# Patient Record
Sex: FEMALE | Race: WHITE | NOT HISPANIC OR LATINO | Employment: OTHER | URBAN - METROPOLITAN AREA
[De-identification: names, ages, dates, MRNs, and addresses within clinical notes are randomized per-mention and may not be internally consistent; named-entity substitution may affect disease eponyms.]

---

## 2023-03-11 PROBLEM — Z85.3 HISTORY OF BREAST CANCER: Status: ACTIVE | Noted: 2023-03-11

## 2023-03-11 PROBLEM — F03.90 DEMENTIA WITHOUT BEHAVIORAL DISTURBANCE (MULTI): Status: ACTIVE | Noted: 2023-03-11

## 2023-04-14 ENCOUNTER — NURSING HOME VISIT (OUTPATIENT)
Dept: POST ACUTE CARE | Facility: EXTERNAL LOCATION | Age: 73
End: 2023-04-14
Payer: MEDICARE

## 2023-04-14 DIAGNOSIS — F03.90 DEMENTIA WITHOUT BEHAVIORAL DISTURBANCE (MULTI): Primary | ICD-10-CM

## 2023-04-14 DIAGNOSIS — R64 CACHEXIA (MULTI): ICD-10-CM

## 2023-04-14 PROCEDURE — 99308 SBSQ NF CARE LOW MDM 20: CPT | Performed by: INTERNAL MEDICINE

## 2023-04-14 NOTE — LETTER
Patient: Snehal Souza  : 1950    Encounter Date: 2023    Subjective  Patient ID: Snehal Souza is a 72 y.o. female who is long term resident being seen and evaluated for multiple medical problems.    HPI  This patient is resting comfortably in her wheelchair in the common area watching television.  She has no complaints of pain or shortness of breath for me.  Nursing has no adverse events reported to me at this time.    Current high risk medication:  Sinemet  Namenda    Laboratory examinations of been reviewed in detail by me and appear to have been done in her prior hospitalization in 2022  Hemoglobin 11.5  Potassium 4.5  Bicarbonate 32  Creatinine 0.48  Albumin 3.9  Liver injury test normal    Review of Systems   Constitutional:  Negative for chills, diaphoresis and fever.   Respiratory:  Negative for cough and shortness of breath.    Cardiovascular:  Negative for chest pain and leg swelling.   Gastrointestinal:  Negative for constipation, diarrhea, nausea and vomiting.   Musculoskeletal:  Negative for joint swelling and myalgias.       Objective  /63   Pulse 76   Temp 36 °C (96.8 °F)   Resp 16   Wt (!) 41.7 kg (92 lb)   SpO2 97%   BMI 16.83 kg/m²     Physical Exam  Vitals reviewed.   Constitutional:       General: She is not in acute distress.     Appearance: She is not ill-appearing.      Comments: This is a very thin cachectic elderly female resting comfortably in her chair in no distress   Cardiovascular:      Rate and Rhythm: Normal rate and regular rhythm.      Pulses: Normal pulses.      Heart sounds:      No gallop.   Pulmonary:      Breath sounds: Normal breath sounds. No wheezing, rhonchi or rales.   Abdominal:      General: Abdomen is flat. Bowel sounds are normal.      Palpations: Abdomen is soft.      Tenderness: There is no guarding or rebound.   Musculoskeletal:      Right lower leg: No edema.      Left lower leg: No edema.   Neurological:      General: No focal  deficit present.      Mental Status: Mental status is at baseline. She is disoriented.      Comments: The patient is moderately to severely confused at baseline.   Psychiatric:         Mood and Affect: Mood normal.         Behavior: Behavior normal.         Assessment/Plan  Problem List Items Addressed This Visit          Nervous    Dementia without behavioral disturbance (CMS/HCC) - Primary       Other    Cachexia (CMS/HCC)       A.  We will continue with restorative and supportive care as the patient tolerates    B.  Laboratory examinations will next be due in May 2023 to include TSH, vitamin B12, and vitamin D.    C.  This patient's prognosis is [id number].      Electronically Signed By: Luis Fernando Zelaya MD   4/17/23  4:15 PM

## 2023-04-17 VITALS
TEMPERATURE: 96.8 F | HEART RATE: 76 BPM | RESPIRATION RATE: 16 BRPM | SYSTOLIC BLOOD PRESSURE: 110 MMHG | WEIGHT: 92 LBS | OXYGEN SATURATION: 97 % | DIASTOLIC BLOOD PRESSURE: 63 MMHG | BODY MASS INDEX: 16.83 KG/M2

## 2023-04-17 PROBLEM — R64 CACHEXIA (MULTI): Status: ACTIVE | Noted: 2023-04-17

## 2023-04-17 ASSESSMENT — ENCOUNTER SYMPTOMS
MYALGIAS: 0
JOINT SWELLING: 0
VOMITING: 0
DIAPHORESIS: 0
CHILLS: 0
CONSTIPATION: 0
DIARRHEA: 0
NAUSEA: 0
COUGH: 0
FEVER: 0
SHORTNESS OF BREATH: 0

## 2023-04-17 NOTE — PROGRESS NOTES
Subjective   Patient ID: Snehal Souza is a 72 y.o. female who is long term resident being seen and evaluated for multiple medical problems.    HPI  This patient is resting comfortably in her wheelchair in the common area watching television.  She has no complaints of pain or shortness of breath for me.  Nursing has no adverse events reported to me at this time.    Current high risk medication:  Sinemet  Namenda    Laboratory examinations of been reviewed in detail by me and appear to have been done in her prior hospitalization in November 2022  Hemoglobin 11.5  Potassium 4.5  Bicarbonate 32  Creatinine 0.48  Albumin 3.9  Liver injury test normal    Review of Systems   Constitutional:  Negative for chills, diaphoresis and fever.   Respiratory:  Negative for cough and shortness of breath.    Cardiovascular:  Negative for chest pain and leg swelling.   Gastrointestinal:  Negative for constipation, diarrhea, nausea and vomiting.   Musculoskeletal:  Negative for joint swelling and myalgias.       Objective   /63   Pulse 76   Temp 36 °C (96.8 °F)   Resp 16   Wt (!) 41.7 kg (92 lb)   SpO2 97%   BMI 16.83 kg/m²     Physical Exam  Vitals reviewed.   Constitutional:       General: She is not in acute distress.     Appearance: She is not ill-appearing.      Comments: This is a very thin cachectic elderly female resting comfortably in her chair in no distress   Cardiovascular:      Rate and Rhythm: Normal rate and regular rhythm.      Pulses: Normal pulses.      Heart sounds:      No gallop.   Pulmonary:      Breath sounds: Normal breath sounds. No wheezing, rhonchi or rales.   Abdominal:      General: Abdomen is flat. Bowel sounds are normal.      Palpations: Abdomen is soft.      Tenderness: There is no guarding or rebound.   Musculoskeletal:      Right lower leg: No edema.      Left lower leg: No edema.   Neurological:      General: No focal deficit present.      Mental Status: Mental status is at baseline. She  is disoriented.      Comments: The patient is moderately to severely confused at baseline.   Psychiatric:         Mood and Affect: Mood normal.         Behavior: Behavior normal.         Assessment/Plan   Problem List Items Addressed This Visit          Nervous    Dementia without behavioral disturbance (CMS/HCC) - Primary       Other    Cachexia (CMS/HCC)       A.  We will continue with restorative and supportive care as the patient tolerates    B.  Laboratory examinations will next be due in May 2023 to include TSH, vitamin B12, and vitamin D.    C.  This patient's prognosis is [id number].

## 2023-04-19 ENCOUNTER — NURSING HOME VISIT (OUTPATIENT)
Dept: POST ACUTE CARE | Facility: EXTERNAL LOCATION | Age: 73
End: 2023-04-19
Payer: MEDICARE

## 2023-04-19 VITALS
TEMPERATURE: 97.1 F | BODY MASS INDEX: 16.83 KG/M2 | HEART RATE: 73 BPM | DIASTOLIC BLOOD PRESSURE: 63 MMHG | RESPIRATION RATE: 18 BRPM | WEIGHT: 92 LBS | OXYGEN SATURATION: 97 % | SYSTOLIC BLOOD PRESSURE: 110 MMHG

## 2023-04-19 DIAGNOSIS — F03.90 DEMENTIA WITHOUT BEHAVIORAL DISTURBANCE (MULTI): Primary | ICD-10-CM

## 2023-04-19 DIAGNOSIS — F41.9 ANXIETY DISORDER, UNSPECIFIED TYPE: ICD-10-CM

## 2023-04-19 DIAGNOSIS — R64 CACHEXIA (MULTI): ICD-10-CM

## 2023-04-19 PROBLEM — R53.81 DEBILITY: Status: ACTIVE | Noted: 2023-04-19

## 2023-04-19 PROCEDURE — 99309 SBSQ NF CARE MODERATE MDM 30: CPT | Performed by: NURSE PRACTITIONER

## 2023-04-19 NOTE — LETTER
Patient: Snehal Souza  : 1950    Encounter Date: 2023    Subjective  Snehal Souza is a 73 y.o. female Here for routine monthly visit.    HPI There are no new problems and multiple health problems have been reviewed.  The course has been unchanged.  The patient  is moderately to severely  confused.   There are no family members present during time of visit.    she  is sitting up in chair,  denies any complaints when asked.  Eating and drinking well.   No concerns per staff.       Review of Systems   Constitutional:         Difficult to obtain due to dementia, denies any complaints when asked.        Objective  /63   Pulse 73   Temp 36.2 °C (97.1 °F)   Resp 18   Wt (!) 41.7 kg (92 lb)   SpO2 97%   BMI 16.83 kg/m²     Physical Exam  Constitutional:       General: She is not in acute distress.     Comments: Frail, cachectic elderly female sitting up in chair, no apparent distress   HENT:      Head: Normocephalic and atraumatic.   Eyes:      Conjunctiva/sclera: Conjunctivae normal.   Cardiovascular:      Rate and Rhythm: Normal rate and regular rhythm.   Pulmonary:      Effort: Pulmonary effort is normal. No respiratory distress.      Breath sounds: Normal breath sounds.   Abdominal:      General: Bowel sounds are normal. There is no distension.      Palpations: Abdomen is soft.      Tenderness: There is no abdominal tenderness.   Musculoskeletal:      Right lower leg: No edema.      Left lower leg: No edema.      Comments: diffusely decreased muscle mass     Skin:     General: Skin is warm and dry.   Neurological:      Mental Status: She is alert.      Comments: Oriented times 1   Psychiatric:         Mood and Affect: Mood normal.         Behavior: Behavior normal.         Assessment/Plan  Problem List Items Addressed This Visit          Nervous    Dementia without behavioral disturbance (CMS/HCC) - Primary     Oriented times 1, no acute changes.   staff to monitor and notify for any  changes.              Other    Cachexia (CMS/HCC)     Thin, frail.  Monitor weights and oral intake.          Anxiety disorder, unspecified     Appears controlled at present, not on meds.  staff to monitor and notify for any changes.          labs/meds/orders reviewed  staff to monitor and notify for any changes.  continue with supportive and restorative care  next labs 5/23 and prn      Electronically Signed By: AASHISH Cabello   4/19/23  2:27 PM

## 2023-04-19 NOTE — PROGRESS NOTES
Subjective   Snehal Souza is a 73 y.o. female Here for routine monthly visit.    HPI There are no new problems and multiple health problems have been reviewed.  The course has been unchanged.  The patient  is moderately to severely  confused.   There are no family members present during time of visit.    she  is sitting up in chair,  denies any complaints when asked.  Eating and drinking well.   No concerns per staff.       Review of Systems   Constitutional:         Difficult to obtain due to dementia, denies any complaints when asked.        Objective   /63   Pulse 73   Temp 36.2 °C (97.1 °F)   Resp 18   Wt (!) 41.7 kg (92 lb)   SpO2 97%   BMI 16.83 kg/m²     Physical Exam  Constitutional:       General: She is not in acute distress.     Comments: Frail, cachectic elderly female sitting up in chair, no apparent distress   HENT:      Head: Normocephalic and atraumatic.   Eyes:      Conjunctiva/sclera: Conjunctivae normal.   Cardiovascular:      Rate and Rhythm: Normal rate and regular rhythm.   Pulmonary:      Effort: Pulmonary effort is normal. No respiratory distress.      Breath sounds: Normal breath sounds.   Abdominal:      General: Bowel sounds are normal. There is no distension.      Palpations: Abdomen is soft.      Tenderness: There is no abdominal tenderness.   Musculoskeletal:      Right lower leg: No edema.      Left lower leg: No edema.      Comments: diffusely decreased muscle mass     Skin:     General: Skin is warm and dry.   Neurological:      Mental Status: She is alert.      Comments: Oriented times 1   Psychiatric:         Mood and Affect: Mood normal.         Behavior: Behavior normal.         Assessment/Plan   Problem List Items Addressed This Visit          Nervous    Dementia without behavioral disturbance (CMS/HCC) - Primary     Oriented times 1, no acute changes.   staff to monitor and notify for any changes.              Other    Cachexia (CMS/HCC)     Thin, frail.  Monitor  weights and oral intake.          Anxiety disorder, unspecified     Appears controlled at present, not on meds.  staff to monitor and notify for any changes.          labs/meds/orders reviewed  staff to monitor and notify for any changes.  continue with supportive and restorative care  next labs 5/23 and prn

## 2023-05-12 ENCOUNTER — NURSING HOME VISIT (OUTPATIENT)
Dept: POST ACUTE CARE | Facility: EXTERNAL LOCATION | Age: 73
End: 2023-05-12
Payer: MEDICARE

## 2023-05-12 VITALS
HEART RATE: 73 BPM | DIASTOLIC BLOOD PRESSURE: 68 MMHG | BODY MASS INDEX: 17.92 KG/M2 | SYSTOLIC BLOOD PRESSURE: 120 MMHG | TEMPERATURE: 97 F | WEIGHT: 98 LBS | OXYGEN SATURATION: 97 % | RESPIRATION RATE: 18 BRPM

## 2023-05-12 DIAGNOSIS — R53.81 DEBILITY: Primary | ICD-10-CM

## 2023-05-12 DIAGNOSIS — F03.90 DEMENTIA WITHOUT BEHAVIORAL DISTURBANCE (MULTI): ICD-10-CM

## 2023-05-12 DIAGNOSIS — F41.9 ANXIETY DISORDER, UNSPECIFIED TYPE: ICD-10-CM

## 2023-05-12 DIAGNOSIS — R64 CACHEXIA (MULTI): ICD-10-CM

## 2023-05-12 PROCEDURE — 99308 SBSQ NF CARE LOW MDM 20: CPT | Performed by: INTERNAL MEDICINE

## 2023-05-12 NOTE — LETTER
Patient: Snehal Souza  : 1950    Encounter Date: 2023    Subjective  Patient ID: Snehal Souza is a 73 y.o. female who is long term resident being seen and evaluated for multiple medical problems.    HPI   This 73-year-old female patient is sitting up in wheelchair in the common area watching television with the other residents.  She has no complaints of pain or shortness of breath.  She remains quite confused.  Nursing has no new adverse events towards me.    Current high risk medication:  Sinemet  Namenda    Laboratory examinations been reviewed in detail by me.    Review of Systems   Constitutional:  Negative for chills, diaphoresis and fever.   Respiratory:  Negative for cough and shortness of breath.    Cardiovascular:  Negative for chest pain and leg swelling.   Gastrointestinal:  Negative for constipation, diarrhea, nausea and vomiting.   Musculoskeletal:  Negative for joint swelling and myalgias.       Objective  /68   Pulse 73   Temp 36.1 °C (97 °F)   Resp 18   Wt (!) 44.5 kg (98 lb)   SpO2 97%   BMI 17.92 kg/m²     Physical Exam  Constitutional:       General: She is not in acute distress.     Comments: Frail, cachectic elderly female sitting up in chair, no apparent distress   HENT:      Head: Normocephalic and atraumatic.   Eyes:      Conjunctiva/sclera: Conjunctivae normal.   Cardiovascular:      Rate and Rhythm: Normal rate and regular rhythm.   Pulmonary:      Effort: Pulmonary effort is normal. No respiratory distress.      Breath sounds: Normal breath sounds.   Abdominal:      General: Bowel sounds are normal. There is no distension.      Palpations: Abdomen is soft.      Tenderness: There is no abdominal tenderness.   Musculoskeletal:      Right lower leg: No edema.      Left lower leg: No edema.      Comments: diffusely decreased muscle mass     Skin:     General: Skin is warm and dry.   Neurological:      Mental Status: She is alert.      Comments: Oriented times 1    Psychiatric:         Mood and Affect: Mood normal.         Behavior: Behavior normal.         Assessment/Plan  Problem List Items Addressed This Visit          Nervous    Dementia without behavioral disturbance (CMS/HCC)       Other    Cachexia (CMS/HCC)    Anxiety disorder, unspecified    Debility - Primary     A.  We will continue with restorative and supportive care as patient tolerates    B.  Laboratory examinations will next be due at any time now    C.  The patient's prognosis remains guarded.        Electronically Signed By: Luis Fernando Zelaya MD   5/15/23  4:02 PM

## 2023-05-12 NOTE — PROGRESS NOTES
Subjective   Patient ID: Snehal Souza is a 73 y.o. female who is long term resident being seen and evaluated for multiple medical problems.    HPI   This 73-year-old female patient is sitting up in wheelchair in the common area watching television with the other residents.  She has no complaints of pain or shortness of breath.  She remains quite confused.  Nursing has no new adverse events towards me.    Current high risk medication:  Sinemet  Namenda    Laboratory examinations been reviewed in detail by me.    Review of Systems   Constitutional:  Negative for chills, diaphoresis and fever.   Respiratory:  Negative for cough and shortness of breath.    Cardiovascular:  Negative for chest pain and leg swelling.   Gastrointestinal:  Negative for constipation, diarrhea, nausea and vomiting.   Musculoskeletal:  Negative for joint swelling and myalgias.       Objective   /68   Pulse 73   Temp 36.1 °C (97 °F)   Resp 18   Wt (!) 44.5 kg (98 lb)   SpO2 97%   BMI 17.92 kg/m²     Physical Exam  Constitutional:       General: She is not in acute distress.     Comments: Frail, cachectic elderly female sitting up in chair, no apparent distress   HENT:      Head: Normocephalic and atraumatic.   Eyes:      Conjunctiva/sclera: Conjunctivae normal.   Cardiovascular:      Rate and Rhythm: Normal rate and regular rhythm.   Pulmonary:      Effort: Pulmonary effort is normal. No respiratory distress.      Breath sounds: Normal breath sounds.   Abdominal:      General: Bowel sounds are normal. There is no distension.      Palpations: Abdomen is soft.      Tenderness: There is no abdominal tenderness.   Musculoskeletal:      Right lower leg: No edema.      Left lower leg: No edema.      Comments: diffusely decreased muscle mass     Skin:     General: Skin is warm and dry.   Neurological:      Mental Status: She is alert.      Comments: Oriented times 1   Psychiatric:         Mood and Affect: Mood normal.         Behavior:  Behavior normal.         Assessment/Plan   Problem List Items Addressed This Visit          Nervous    Dementia without behavioral disturbance (CMS/HCC)       Other    Cachexia (CMS/HCC)    Anxiety disorder, unspecified    Debility - Primary     A.  We will continue with restorative and supportive care as patient tolerates    B.  Laboratory examinations will next be due at any time now    C.  The patient's prognosis remains guarded.

## 2023-05-15 ASSESSMENT — ENCOUNTER SYMPTOMS
FEVER: 0
JOINT SWELLING: 0
DIARRHEA: 0
CHILLS: 0
MYALGIAS: 0
SHORTNESS OF BREATH: 0
CONSTIPATION: 0
DIAPHORESIS: 0
NAUSEA: 0
COUGH: 0
VOMITING: 0

## 2023-06-23 ENCOUNTER — NURSING HOME VISIT (OUTPATIENT)
Dept: POST ACUTE CARE | Facility: EXTERNAL LOCATION | Age: 73
End: 2023-06-23
Payer: MEDICARE

## 2023-06-23 DIAGNOSIS — R53.81 DEBILITY: ICD-10-CM

## 2023-06-23 DIAGNOSIS — R40.4 TRANSIENT ALTERATION OF AWARENESS: Primary | ICD-10-CM

## 2023-06-23 DIAGNOSIS — R64 CACHEXIA (MULTI): ICD-10-CM

## 2023-06-23 DIAGNOSIS — F03.90 DEMENTIA WITHOUT BEHAVIORAL DISTURBANCE (MULTI): ICD-10-CM

## 2023-06-23 LAB
ALANINE AMINOTRANSFERASE (SGPT) (U/L) IN SER/PLAS: 8 U/L (ref 7–45)
ALBUMIN (G/DL) IN SER/PLAS: 4 G/DL (ref 3.4–5)
ALKALINE PHOSPHATASE (U/L) IN SER/PLAS: 54 U/L (ref 33–136)
ANION GAP IN SER/PLAS: 12 MMOL/L (ref 10–20)
APPEARANCE, URINE: NORMAL
ASCORBIC ACID: NORMAL MG/DL
ASPARTATE AMINOTRANSFERASE (SGOT) (U/L) IN SER/PLAS: 17 U/L (ref 9–39)
BASOPHILS (10*3/UL) IN BLOOD BY AUTOMATED COUNT: 0.03 X10E9/L (ref 0–0.1)
BASOPHILS/100 LEUKOCYTES IN BLOOD BY AUTOMATED COUNT: 0.4 % (ref 0–2)
BILIRUBIN TOTAL (MG/DL) IN SER/PLAS: 0.4 MG/DL (ref 0–1.2)
BILIRUBIN, URINE: NORMAL
BLOOD, URINE: NORMAL
CALCIUM (MG/DL) IN SER/PLAS: 8.9 MG/DL (ref 8.6–10.3)
CARBON DIOXIDE, TOTAL (MMOL/L) IN SER/PLAS: 28 MMOL/L (ref 21–32)
CHLORIDE (MMOL/L) IN SER/PLAS: 106 MMOL/L (ref 98–107)
COLOR, URINE: NORMAL
CREATININE (MG/DL) IN SER/PLAS: 0.5 MG/DL (ref 0.5–1.05)
EOSINOPHILS (10*3/UL) IN BLOOD BY AUTOMATED COUNT: 0.18 X10E9/L (ref 0–0.4)
EOSINOPHILS/100 LEUKOCYTES IN BLOOD BY AUTOMATED COUNT: 2.6 % (ref 0–6)
ERYTHROCYTE DISTRIBUTION WIDTH (RATIO) BY AUTOMATED COUNT: 12.6 % (ref 11.5–14.5)
ERYTHROCYTE MEAN CORPUSCULAR HEMOGLOBIN CONCENTRATION (G/DL) BY AUTOMATED: 32.7 G/DL (ref 32–36)
ERYTHROCYTE MEAN CORPUSCULAR VOLUME (FL) BY AUTOMATED COUNT: 93 FL (ref 80–100)
ERYTHROCYTES (10*6/UL) IN BLOOD BY AUTOMATED COUNT: 4.36 X10E12/L (ref 4–5.2)
GFR FEMALE: >90 ML/MIN/1.73M2
GLUCOSE (MG/DL) IN SER/PLAS: 59 MG/DL (ref 74–99)
GLUCOSE, URINE: NORMAL
HEMATOCRIT (%) IN BLOOD BY AUTOMATED COUNT: 40.7 % (ref 36–46)
HEMOGLOBIN (G/DL) IN BLOOD: 13.3 G/DL (ref 12–16)
IMMATURE GRANULOCYTES/100 LEUKOCYTES IN BLOOD BY AUTOMATED COUNT: 0.3 % (ref 0–0.9)
KETONES, URINE: NORMAL
LEUKOCYTE ESTERASE, URINE: NORMAL
LEUKOCYTES (10*3/UL) IN BLOOD BY AUTOMATED COUNT: 7.1 X10E9/L (ref 4.4–11.3)
LYMPHOCYTES (10*3/UL) IN BLOOD BY AUTOMATED COUNT: 1.26 X10E9/L (ref 0.8–3)
LYMPHOCYTES/100 LEUKOCYTES IN BLOOD BY AUTOMATED COUNT: 17.9 % (ref 13–44)
MONOCYTES (10*3/UL) IN BLOOD BY AUTOMATED COUNT: 0.63 X10E9/L (ref 0.05–0.8)
MONOCYTES/100 LEUKOCYTES IN BLOOD BY AUTOMATED COUNT: 8.9 % (ref 2–10)
NEUTROPHILS (10*3/UL) IN BLOOD BY AUTOMATED COUNT: 4.93 X10E9/L (ref 1.6–5.5)
NEUTROPHILS/100 LEUKOCYTES IN BLOOD BY AUTOMATED COUNT: 69.9 % (ref 40–80)
NITRITE, URINE: NORMAL
NRBC (PER 100 WBCS) BY AUTOMATED COUNT: 0 /100 WBC (ref 0–0)
PH, URINE: NORMAL
PLATELETS (10*3/UL) IN BLOOD AUTOMATED COUNT: 177 X10E9/L (ref 150–450)
POTASSIUM (MMOL/L) IN SER/PLAS: 4 MMOL/L (ref 3.5–5.3)
PROTEIN TOTAL: 6 G/DL (ref 6.4–8.2)
PROTEIN, URINE: NORMAL
SODIUM (MMOL/L) IN SER/PLAS: 142 MMOL/L (ref 136–145)
SPECIFIC GRAVITY, URINE: NORMAL
UREA NITROGEN (MG/DL) IN SER/PLAS: 21 MG/DL (ref 6–23)
UROBILINOGEN, URINE: NORMAL

## 2023-06-23 PROCEDURE — 99310 SBSQ NF CARE HIGH MDM 45: CPT | Performed by: INTERNAL MEDICINE

## 2023-06-23 NOTE — LETTER
Patient: Snehal Souza  : 1950    Encounter Date: 2023    Subjective  Patient ID: Snehal Souza is a 73 y.o. female who is long term resident being seen and evaluated for multiple medical problems.    HPI   This 73-year-old female patient is sitting up in her wheelchair in the dining room today.  She is unresponsive for me staring out into space and leaning slightly forward.  Per nursing the patient was acting normally earlier today.  I asked nursing to bring the patient back to her bed and lay her down and we will observe the patient closely and do laboratory examinations to look for potential causes for this change in behavior such as an infection.    Current high risk medication:  Sinemet  Namenda    Laboratory examinations from May 2023 have been reviewed in detail by me.    Review of Systems   Reason unable to perform ROS: The patient is nonverbal at this time.       Objective  /68   Pulse 63   Temp 36.4 °C (97.5 °F)   Resp 16   Wt 45.4 kg (100 lb)   SpO2 94%   BMI 18.29 kg/m²     Physical Exam  Vitals reviewed.   Constitutional:       General: She is not in acute distress.     Appearance: She is ill-appearing.   HENT:      Mouth/Throat:      Mouth: Mucous membranes are moist.   Cardiovascular:      Rate and Rhythm: Normal rate and regular rhythm.      Pulses: Normal pulses.      Heart sounds:      No gallop.   Pulmonary:      Breath sounds: Normal breath sounds. No wheezing, rhonchi or rales.   Abdominal:      General: Abdomen is flat. Bowel sounds are normal.      Palpations: Abdomen is soft.      Tenderness: There is no guarding or rebound.   Musculoskeletal:      Right lower leg: No edema.      Left lower leg: No edema.   Neurological:      Comments: The patient is awake but unresponsive         Assessment/Plan  Problem List Items Addressed This Visit       Dementia without behavioral disturbance (CMS/HCC)    Cachexia (CMS/HCC)    Debility    Transient alteration of awareness -  Primary       A.  The patient's demeanor is concerning for an acute infection and therefore laboratory examinations for blood work urine analysis injections greater been sent off stat today.    B.  The patient's status will be treated according to her laboratory examinations.  Should the patient's status continued to decline then she will certainly be transferred to the emergency department for further evaluation.    C.  Laboratory examinations pending the ones listed above will likely be repeated in November 2023 or as needed    D.  The patient's prognosis is guarded at this time.      Electronically Signed By: Luis Fernando Zelaya MD   6/28/23  8:43 PM

## 2023-06-26 VITALS
TEMPERATURE: 97.5 F | RESPIRATION RATE: 16 BRPM | HEART RATE: 63 BPM | WEIGHT: 100 LBS | SYSTOLIC BLOOD PRESSURE: 110 MMHG | BODY MASS INDEX: 18.29 KG/M2 | DIASTOLIC BLOOD PRESSURE: 68 MMHG | OXYGEN SATURATION: 94 %

## 2023-06-26 NOTE — PROGRESS NOTES
Subjective   Patient ID: Snehal Souza is a 73 y.o. female who is long term resident being seen and evaluated for multiple medical problems.    HPI   This 73-year-old female patient is sitting up in her wheelchair in the dining room today.  She is unresponsive for me staring out into space and leaning slightly forward.  Per nursing the patient was acting normally earlier today.  I asked nursing to bring the patient back to her bed and lay her down and we will observe the patient closely and do laboratory examinations to look for potential causes for this change in behavior such as an infection.    Current high risk medication:  Sinemet  Namenda    Laboratory examinations from May 2023 have been reviewed in detail by me.    Review of Systems   Reason unable to perform ROS: The patient is nonverbal at this time.       Objective   /68   Pulse 63   Temp 36.4 °C (97.5 °F)   Resp 16   Wt 45.4 kg (100 lb)   SpO2 94%   BMI 18.29 kg/m²     Physical Exam  Vitals reviewed.   Constitutional:       General: She is not in acute distress.     Appearance: She is ill-appearing.   HENT:      Mouth/Throat:      Mouth: Mucous membranes are moist.   Cardiovascular:      Rate and Rhythm: Normal rate and regular rhythm.      Pulses: Normal pulses.      Heart sounds:      No gallop.   Pulmonary:      Breath sounds: Normal breath sounds. No wheezing, rhonchi or rales.   Abdominal:      General: Abdomen is flat. Bowel sounds are normal.      Palpations: Abdomen is soft.      Tenderness: There is no guarding or rebound.   Musculoskeletal:      Right lower leg: No edema.      Left lower leg: No edema.   Neurological:      Comments: The patient is awake but unresponsive         Assessment/Plan   Problem List Items Addressed This Visit       Dementia without behavioral disturbance (CMS/HCC)    Cachexia (CMS/HCC)    Debility    Transient alteration of awareness - Primary       A.  The patient's demeanor is concerning for an acute  infection and therefore laboratory examinations for blood work urine analysis injections greater been sent off stat today.    B.  The patient's status will be treated according to her laboratory examinations.  Should the patient's status continued to decline then she will certainly be transferred to the emergency department for further evaluation.    C.  Laboratory examinations pending the ones listed above will likely be repeated in November 2023 or as needed    D.  The patient's prognosis is guarded at this time.

## 2023-06-28 ENCOUNTER — NURSING HOME VISIT (OUTPATIENT)
Dept: POST ACUTE CARE | Facility: EXTERNAL LOCATION | Age: 73
End: 2023-06-28
Payer: MEDICARE

## 2023-06-28 VITALS
SYSTOLIC BLOOD PRESSURE: 110 MMHG | BODY MASS INDEX: 18.29 KG/M2 | OXYGEN SATURATION: 97 % | WEIGHT: 100 LBS | RESPIRATION RATE: 16 BRPM | HEART RATE: 76 BPM | DIASTOLIC BLOOD PRESSURE: 68 MMHG | TEMPERATURE: 97.2 F

## 2023-06-28 DIAGNOSIS — F41.9 ANXIETY DISORDER, UNSPECIFIED TYPE: ICD-10-CM

## 2023-06-28 DIAGNOSIS — R64 CACHEXIA (MULTI): ICD-10-CM

## 2023-06-28 DIAGNOSIS — R53.81 DEBILITY: ICD-10-CM

## 2023-06-28 DIAGNOSIS — F03.90 DEMENTIA WITHOUT BEHAVIORAL DISTURBANCE (MULTI): Primary | ICD-10-CM

## 2023-06-28 PROBLEM — R40.4 TRANSIENT ALTERATION OF AWARENESS: Status: ACTIVE | Noted: 2023-06-28

## 2023-06-28 PROCEDURE — 99309 SBSQ NF CARE MODERATE MDM 30: CPT | Performed by: NURSE PRACTITIONER

## 2023-06-28 NOTE — PROGRESS NOTES
Subjective   Snehal Souza is a 73 y.o. female Here for routine monthly visit.    HPI There are no new problems and multiple health problems have been reviewed.  The course has been unchanged.  The patient  is moderately to severely  confused.   There are no family members present during time of visit.    she  is resting in bed,  denies any complaints when asked.  Eating and drinking well.   No concerns per staff.   She had labs, cxr and urine last week due to increased lethargy.  Labs and cxr unremarkable.  UA was cancelled, her sx are resolved.       Review of Systems   Constitutional:         Difficult to obtain due to dementia, denies any complaints when asked.        Objective   /68   Pulse 76   Temp 36.2 °C (97.2 °F)   Resp 16   Wt 45.4 kg (100 lb)   SpO2 97%   BMI 18.29 kg/m²     Physical Exam  Constitutional:       General: She is not in acute distress.     Comments: Frail, cachectic elderly female sitting up in chair, no apparent distress   HENT:      Head: Normocephalic and atraumatic.   Eyes:      Conjunctiva/sclera: Conjunctivae normal.   Cardiovascular:      Rate and Rhythm: Normal rate and regular rhythm.   Pulmonary:      Effort: Pulmonary effort is normal. No respiratory distress.      Breath sounds: Normal breath sounds.   Abdominal:      General: Bowel sounds are normal. There is no distension.      Palpations: Abdomen is soft.      Tenderness: There is no abdominal tenderness.   Musculoskeletal:      Right lower leg: No edema.      Left lower leg: No edema.      Comments: diffusely decreased muscle mass     Skin:     General: Skin is warm and dry.   Neurological:      Mental Status: She is alert.      Comments: Oriented times 1   Psychiatric:         Mood and Affect: Mood normal.         Behavior: Behavior normal.         Assessment/Plan   Problem List Items Addressed This Visit       Dementia without behavioral disturbance (CMS/HCC) - Primary     Oriented times 1, no acute changes.    staff to monitor and notify for any changes.           Cachexia (CMS/HCC)     Thin, frail.  Monitor weights and oral intake.          Anxiety disorder, unspecified     Appears controlled at present, not on meds.  staff to monitor and notify for any changes.           Debility     Requires assistance with ADL's.          labs/meds/orders reviewed  staff to monitor and notify for any changes.  continue with supportive and restorative care  next labs 11/23 and prn

## 2023-06-28 NOTE — LETTER
Patient: Snehal Souza  : 1950    Encounter Date: 2023    Subjective  Snehal Souza is a 73 y.o. female Here for routine monthly visit.    HPI There are no new problems and multiple health problems have been reviewed.  The course has been unchanged.  The patient  is moderately to severely  confused.   There are no family members present during time of visit.    she  is resting in bed,  denies any complaints when asked.  Eating and drinking well.   No concerns per staff.   She had labs, cxr and urine last week due to increased lethargy.  Labs and cxr unremarkable.  UA was cancelled, her sx are resolved.       Review of Systems   Constitutional:         Difficult to obtain due to dementia, denies any complaints when asked.        Objective  /68   Pulse 76   Temp 36.2 °C (97.2 °F)   Resp 16   Wt 45.4 kg (100 lb)   SpO2 97%   BMI 18.29 kg/m²     Physical Exam  Constitutional:       General: She is not in acute distress.     Comments: Frail, cachectic elderly female sitting up in chair, no apparent distress   HENT:      Head: Normocephalic and atraumatic.   Eyes:      Conjunctiva/sclera: Conjunctivae normal.   Cardiovascular:      Rate and Rhythm: Normal rate and regular rhythm.   Pulmonary:      Effort: Pulmonary effort is normal. No respiratory distress.      Breath sounds: Normal breath sounds.   Abdominal:      General: Bowel sounds are normal. There is no distension.      Palpations: Abdomen is soft.      Tenderness: There is no abdominal tenderness.   Musculoskeletal:      Right lower leg: No edema.      Left lower leg: No edema.      Comments: diffusely decreased muscle mass     Skin:     General: Skin is warm and dry.   Neurological:      Mental Status: She is alert.      Comments: Oriented times 1   Psychiatric:         Mood and Affect: Mood normal.         Behavior: Behavior normal.         Assessment/Plan  Problem List Items Addressed This Visit       Dementia without behavioral  disturbance (CMS/HCC) - Primary     Oriented times 1, no acute changes.   staff to monitor and notify for any changes.           Cachexia (CMS/HCC)     Thin, frail.  Monitor weights and oral intake.          Anxiety disorder, unspecified     Appears controlled at present, not on meds.  staff to monitor and notify for any changes.           Debility     Requires assistance with ADL's.          labs/meds/orders reviewed  staff to monitor and notify for any changes.  continue with supportive and restorative care  next labs 11/23 and prn      Electronically Signed By: RADHA Cabello-JASON   6/28/23  1:36 PM

## 2023-07-28 ENCOUNTER — NURSING HOME VISIT (OUTPATIENT)
Dept: POST ACUTE CARE | Facility: EXTERNAL LOCATION | Age: 73
End: 2023-07-28
Payer: MEDICARE

## 2023-07-28 VITALS
BODY MASS INDEX: 18.29 KG/M2 | SYSTOLIC BLOOD PRESSURE: 120 MMHG | HEART RATE: 75 BPM | OXYGEN SATURATION: 95 % | RESPIRATION RATE: 18 BRPM | WEIGHT: 100 LBS | TEMPERATURE: 98.1 F | DIASTOLIC BLOOD PRESSURE: 82 MMHG

## 2023-07-28 DIAGNOSIS — R64 CACHEXIA (MULTI): Primary | ICD-10-CM

## 2023-07-28 DIAGNOSIS — R53.81 DEBILITY: ICD-10-CM

## 2023-07-28 DIAGNOSIS — F03.90 DEMENTIA WITHOUT BEHAVIORAL DISTURBANCE (MULTI): ICD-10-CM

## 2023-07-28 PROCEDURE — 99308 SBSQ NF CARE LOW MDM 20: CPT | Performed by: INTERNAL MEDICINE

## 2023-07-28 NOTE — LETTER
Patient: Snehal Souza  : 1950    Encounter Date: 2023    Subjective  Patient ID: Snehal Souza is a 73 y.o. female who is long term resident being seen and evaluated for multiple medical problems.    HPI   This 73-year-old female patient is resting comfortably in her wheelchair no distress.  She has no complaints of pain or shortness of breath me.  Nursing has no new adverse events reported to me.    Current high risk medication:  Sinemet  Namenda    Chest x-ray from 2023 was read as negative    Laboratory examination from 2023:  Potassium 4.0  Bicarbonate 28  Creatinine 0.5  Albumin 4.0  Liver injury test normal  Hemoglobin 13.3  Vitamin B12 675  TSH 1.38  Vitamin D 23    Review of Systems   Reason unable to perform ROS: The patient is nonverbal at this time.       Objective  /82   Pulse 75   Temp 36.7 °C (98.1 °F)   Resp 18   Wt 45.4 kg (100 lb)   SpO2 95%   BMI 18.29 kg/m²     Physical Exam  Vitals reviewed.   Constitutional:       General: She is not in acute distress.     Appearance: She is ill-appearing.   HENT:      Mouth/Throat:      Mouth: Mucous membranes are moist.   Cardiovascular:      Rate and Rhythm: Normal rate and regular rhythm.      Pulses: Normal pulses.      Heart sounds:      No gallop.   Pulmonary:      Breath sounds: Normal breath sounds. No wheezing, rhonchi or rales.   Abdominal:      General: Abdomen is flat. Bowel sounds are normal.      Palpations: Abdomen is soft.      Tenderness: There is no guarding or rebound.   Musculoskeletal:      Right lower leg: No edema.      Left lower leg: No edema.   Neurological:      Comments: The patient is awake but unresponsive         Assessment/Plan  Problem List Items Addressed This Visit       Dementia without behavioral disturbance (CMS/HCC)    Cachexia (CMS/HCC) - Primary    Debility     A.  We will continue with restorative and supportive care as the patient tolerates    B.  Laboratory examinations will next be  due in December 2023 or as needed    C.  The patient's prognosis is guarded.        Electronically Signed By: Luis Fernando Zelaya MD   8/1/23  6:27 PM

## 2023-07-29 NOTE — PROGRESS NOTES
Subjective   Patient ID: Snehal Souza is a 73 y.o. female who is long term resident being seen and evaluated for multiple medical problems.    HPI   This 73-year-old female patient is resting comfortably in her wheelchair no distress.  She has no complaints of pain or shortness of breath me.  Nursing has no new adverse events reported to me.    Current high risk medication:  Sinemet  Namenda    Chest x-ray from June 2023 was read as negative    Laboratory examination from June 2023:  Potassium 4.0  Bicarbonate 28  Creatinine 0.5  Albumin 4.0  Liver injury test normal  Hemoglobin 13.3  Vitamin B12 675  TSH 1.38  Vitamin D 23    Review of Systems   Reason unable to perform ROS: The patient is nonverbal at this time.       Objective   /82   Pulse 75   Temp 36.7 °C (98.1 °F)   Resp 18   Wt 45.4 kg (100 lb)   SpO2 95%   BMI 18.29 kg/m²     Physical Exam  Vitals reviewed.   Constitutional:       General: She is not in acute distress.     Appearance: She is ill-appearing.   HENT:      Mouth/Throat:      Mouth: Mucous membranes are moist.   Cardiovascular:      Rate and Rhythm: Normal rate and regular rhythm.      Pulses: Normal pulses.      Heart sounds:      No gallop.   Pulmonary:      Breath sounds: Normal breath sounds. No wheezing, rhonchi or rales.   Abdominal:      General: Abdomen is flat. Bowel sounds are normal.      Palpations: Abdomen is soft.      Tenderness: There is no guarding or rebound.   Musculoskeletal:      Right lower leg: No edema.      Left lower leg: No edema.   Neurological:      Comments: The patient is awake but unresponsive         Assessment/Plan   Problem List Items Addressed This Visit       Dementia without behavioral disturbance (CMS/HCC)    Cachexia (CMS/HCC) - Primary    Debility     A.  We will continue with restorative and supportive care as the patient tolerates    B.  Laboratory examinations will next be due in December 2023 or as needed    C.  The patient's prognosis is  guarded.

## 2023-08-30 ENCOUNTER — NURSING HOME VISIT (OUTPATIENT)
Dept: POST ACUTE CARE | Facility: EXTERNAL LOCATION | Age: 73
End: 2023-08-30
Payer: MEDICARE

## 2023-08-30 VITALS
OXYGEN SATURATION: 96 % | BODY MASS INDEX: 18.29 KG/M2 | TEMPERATURE: 97 F | DIASTOLIC BLOOD PRESSURE: 70 MMHG | RESPIRATION RATE: 18 BRPM | WEIGHT: 100 LBS | SYSTOLIC BLOOD PRESSURE: 116 MMHG | HEART RATE: 70 BPM

## 2023-08-30 DIAGNOSIS — R53.81 DEBILITY: ICD-10-CM

## 2023-08-30 DIAGNOSIS — G20.A1 PARKINSON DISEASE (MULTI): ICD-10-CM

## 2023-08-30 DIAGNOSIS — E55.9 VITAMIN D DEFICIENCY: ICD-10-CM

## 2023-08-30 DIAGNOSIS — F03.90 DEMENTIA WITHOUT BEHAVIORAL DISTURBANCE (MULTI): Primary | ICD-10-CM

## 2023-08-30 DIAGNOSIS — R64 CACHEXIA (MULTI): ICD-10-CM

## 2023-08-30 PROCEDURE — 99309 SBSQ NF CARE MODERATE MDM 30: CPT | Performed by: NURSE PRACTITIONER

## 2023-08-30 NOTE — LETTER
Patient: Snehal Souza  : 1950    Encounter Date: 2023    Subjective  Snehal Souza is a 73 y.o. female Here for routine monthly visit.    HPI There are no new problems and multiple health problems have been reviewed.  The course has been unchanged.  The patient  is moderately to severely  confused.   There are no family members present during time of visit.    she  is sitting up in chair,  denies any complaints when asked.  Eating and drinking well.   No concerns per staff.   She is completing course of macrobid for uti.  No fever, chills.        Review of Systems   Constitutional:         Difficult to obtain due to dementia, denies any complaints when asked.        Objective  /70   Pulse 70   Temp 36.1 °C (97 °F)   Resp 18   Wt 45.4 kg (100 lb)   SpO2 96%   BMI 18.29 kg/m²     Physical Exam  Constitutional:       General: She is not in acute distress.     Comments: Frail, cachectic elderly female sitting up in chair, no apparent distress   HENT:      Head: Normocephalic and atraumatic.   Eyes:      Conjunctiva/sclera: Conjunctivae normal.   Cardiovascular:      Rate and Rhythm: Normal rate and regular rhythm.   Pulmonary:      Effort: Pulmonary effort is normal. No respiratory distress.      Breath sounds: Normal breath sounds.   Abdominal:      General: Bowel sounds are normal. There is no distension.      Palpations: Abdomen is soft.      Tenderness: There is no abdominal tenderness.   Musculoskeletal:      Right lower leg: No edema.      Left lower leg: No edema.      Comments: diffusely decreased muscle mass     Skin:     General: Skin is warm and dry.   Neurological:      Mental Status: She is alert.      Comments: Oriented times 1   Psychiatric:         Mood and Affect: Mood normal.         Behavior: Behavior normal.         Assessment/Plan  Problem List Items Addressed This Visit       Dementia without behavioral disturbance (CMS/HCC) - Primary     Oriented times 1, no acute changes.    staff to monitor and notify for any changes.           Cachexia (CMS/HCC)     Thin, frail.  Monitor weights and oral intake.          Debility     Requires assistance with ADL's.           Parkinson disease (CMS/HCC)     On sinemet.  continue with current medical management           Vitamin D deficiency     On supplement, monitor with routine labs        labs/meds/orders reviewed  staff to monitor and notify for any changes.  continue with supportive and restorative care  next labs 11/23 and prn      Electronically Signed By: AASHISH Cabello   8/30/23  2:47 PM

## 2023-08-30 NOTE — ASSESSMENT & PLAN NOTE
>>ASSESSMENT AND PLAN FOR PARKINSON DISEASE (MULTI) WRITTEN ON 8/30/2023  2:45 PM BY AASHISH PORTER    On sinemet.  continue with current medical management

## 2023-08-30 NOTE — PROGRESS NOTES
Subjective   Snehal Souza is a 73 y.o. female Here for routine monthly visit.    HPI There are no new problems and multiple health problems have been reviewed.  The course has been unchanged.  The patient  is moderately to severely  confused.   There are no family members present during time of visit.    she  is sitting up in chair,  denies any complaints when asked.  Eating and drinking well.   No concerns per staff.   She is completing course of macrobid for uti.  No fever, chills.        Review of Systems   Constitutional:         Difficult to obtain due to dementia, denies any complaints when asked.        Objective   /70   Pulse 70   Temp 36.1 °C (97 °F)   Resp 18   Wt 45.4 kg (100 lb)   SpO2 96%   BMI 18.29 kg/m²     Physical Exam  Constitutional:       General: She is not in acute distress.     Comments: Frail, cachectic elderly female sitting up in chair, no apparent distress   HENT:      Head: Normocephalic and atraumatic.   Eyes:      Conjunctiva/sclera: Conjunctivae normal.   Cardiovascular:      Rate and Rhythm: Normal rate and regular rhythm.   Pulmonary:      Effort: Pulmonary effort is normal. No respiratory distress.      Breath sounds: Normal breath sounds.   Abdominal:      General: Bowel sounds are normal. There is no distension.      Palpations: Abdomen is soft.      Tenderness: There is no abdominal tenderness.   Musculoskeletal:      Right lower leg: No edema.      Left lower leg: No edema.      Comments: diffusely decreased muscle mass     Skin:     General: Skin is warm and dry.   Neurological:      Mental Status: She is alert.      Comments: Oriented times 1   Psychiatric:         Mood and Affect: Mood normal.         Behavior: Behavior normal.         Assessment/Plan   Problem List Items Addressed This Visit       Dementia without behavioral disturbance (CMS/HCC) - Primary     Oriented times 1, no acute changes.   staff to monitor and notify for any changes.           Cachexia  (CMS/[facility])     Thin, frail.  Monitor weights and oral intake.          Debility     Requires assistance with ADL's.           Parkinson disease (CMS/[facility])     On sinemet.  continue with current medical management           Vitamin D deficiency     On supplement, monitor with routine labs        labs/meds/orders reviewed  staff to monitor and notify for any changes.  continue with supportive and restorative care  next labs 11/23 and prn

## 2023-09-08 ENCOUNTER — NURSING HOME VISIT (OUTPATIENT)
Dept: POST ACUTE CARE | Facility: EXTERNAL LOCATION | Age: 73
End: 2023-09-08
Payer: MEDICARE

## 2023-09-08 DIAGNOSIS — R53.81 DEBILITY: ICD-10-CM

## 2023-09-08 DIAGNOSIS — F03.90 DEMENTIA WITHOUT BEHAVIORAL DISTURBANCE (MULTI): ICD-10-CM

## 2023-09-08 DIAGNOSIS — B96.20 E. COLI URINARY TRACT INFECTION: Primary | ICD-10-CM

## 2023-09-08 DIAGNOSIS — N39.0 E. COLI URINARY TRACT INFECTION: Primary | ICD-10-CM

## 2023-09-08 DIAGNOSIS — R64 CACHEXIA (MULTI): ICD-10-CM

## 2023-09-08 DIAGNOSIS — G20.A1 PARKINSON DISEASE (MULTI): ICD-10-CM

## 2023-09-08 PROCEDURE — 99309 SBSQ NF CARE MODERATE MDM 30: CPT | Performed by: INTERNAL MEDICINE

## 2023-09-08 NOTE — LETTER
Patient: Snehal Souza  : 1950    Encounter Date: 2023    Subjective  Patient ID: Snehal Souza is a 73 y.o. female who is long term resident being seen and evaluated for multiple medical problems.    HPI   This 73-year-old female patient is resting comfortably in her bed in no distress.  She was recently treated with Macrobid for E. coli urinary tract infection with good recovery per nursing.  Nursing has no new adverse events reported to me at this time.    Current high risk medication:  Sinemet  Namenda    Laboratory examination from 2023:  Potassium 4.0  Bicarbonate 28  Creatinine 0.5  Albumin 4.0  Liver injury test normal  Hemoglobin 13.3  Vitamin B12 675  TSH 1.38  Vitamin D 22.28    Review of Systems   Reason unable to perform ROS: The patient is nonverbal at this time.       Objective  /68   Pulse 70   Temp 36.1 °C (97 °F)   Resp 18   Wt 45.4 kg (100 lb)   SpO2 95%   BMI 18.29 kg/m²     Physical Exam  Vitals reviewed.   Constitutional:       General: She is not in acute distress.     Appearance: She is ill-appearing.   HENT:      Mouth/Throat:      Mouth: Mucous membranes are moist.   Cardiovascular:      Rate and Rhythm: Normal rate and regular rhythm.      Pulses: Normal pulses.      Heart sounds:      No gallop.   Pulmonary:      Breath sounds: Normal breath sounds. No wheezing, rhonchi or rales.   Abdominal:      General: Abdomen is flat. Bowel sounds are normal.      Palpations: Abdomen is soft.      Tenderness: There is no guarding or rebound.   Musculoskeletal:      Right lower leg: No edema.      Left lower leg: No edema.   Neurological:      Comments: The patient is awake but unresponsive         Assessment/Plan  Problem List Items Addressed This Visit       Dementia without behavioral disturbance (CMS/HCC)    Cachexia (CMS/HCC)    Debility    Parkinson disease (CMS/HCC)    E. coli urinary tract infection - Primary       8.  We will continue with restorative and  supportive care as the patient tolerates    B.  We will monitor the patient for recurrence of urinary tract infection and treat appropriately if it reoccurs    C.  Laboratory examinations will next be due in December 2023 or as needed    D.  The patient's prognosis is [id number].      Electronically Signed By: Luis Fernando Zelaya MD   9/13/23  5:12 PM

## 2023-09-11 VITALS
TEMPERATURE: 97 F | DIASTOLIC BLOOD PRESSURE: 68 MMHG | SYSTOLIC BLOOD PRESSURE: 120 MMHG | RESPIRATION RATE: 18 BRPM | OXYGEN SATURATION: 95 % | WEIGHT: 100 LBS | BODY MASS INDEX: 18.29 KG/M2 | HEART RATE: 70 BPM

## 2023-09-11 NOTE — PROGRESS NOTES
Subjective   Patient ID: Snehal Souza is a 73 y.o. female who is long term resident being seen and evaluated for multiple medical problems.    HPI   This 73-year-old female patient is resting comfortably in her bed in no distress.  She was recently treated with Macrobid for E. coli urinary tract infection with good recovery per nursing.  Nursing has no new adverse events reported to me at this time.    Current high risk medication:  Sinemet  Namenda    Laboratory examination from June 2023:  Potassium 4.0  Bicarbonate 28  Creatinine 0.5  Albumin 4.0  Liver injury test normal  Hemoglobin 13.3  Vitamin B12 675  TSH 1.38  Vitamin D 22.28    Review of Systems   Reason unable to perform ROS: The patient is nonverbal at this time.       Objective   /68   Pulse 70   Temp 36.1 °C (97 °F)   Resp 18   Wt 45.4 kg (100 lb)   SpO2 95%   BMI 18.29 kg/m²     Physical Exam  Vitals reviewed.   Constitutional:       General: She is not in acute distress.     Appearance: She is ill-appearing.   HENT:      Mouth/Throat:      Mouth: Mucous membranes are moist.   Cardiovascular:      Rate and Rhythm: Normal rate and regular rhythm.      Pulses: Normal pulses.      Heart sounds:      No gallop.   Pulmonary:      Breath sounds: Normal breath sounds. No wheezing, rhonchi or rales.   Abdominal:      General: Abdomen is flat. Bowel sounds are normal.      Palpations: Abdomen is soft.      Tenderness: There is no guarding or rebound.   Musculoskeletal:      Right lower leg: No edema.      Left lower leg: No edema.   Neurological:      Comments: The patient is awake but unresponsive         Assessment/Plan   Problem List Items Addressed This Visit       Dementia without behavioral disturbance (CMS/HCC)    Cachexia (CMS/HCC)    Debility    Parkinson disease (CMS/HCC)    E. coli urinary tract infection - Primary       8.  We will continue with restorative and supportive care as the patient tolerates    B.  We will monitor the patient  for recurrence of urinary tract infection and treat appropriately if it reoccurs    C.  Laboratory examinations will next be due in December 2023 or as needed    D.  The patient's prognosis is [id number].

## 2023-09-13 PROBLEM — B96.20 E. COLI URINARY TRACT INFECTION: Status: ACTIVE | Noted: 2023-09-13

## 2023-09-13 PROBLEM — N39.0 E. COLI URINARY TRACT INFECTION: Status: ACTIVE | Noted: 2023-09-13

## 2023-10-18 ENCOUNTER — NURSING HOME VISIT (OUTPATIENT)
Dept: POST ACUTE CARE | Facility: EXTERNAL LOCATION | Age: 73
End: 2023-10-18
Payer: MEDICARE

## 2023-10-18 VITALS
OXYGEN SATURATION: 94 % | WEIGHT: 100 LBS | HEART RATE: 68 BPM | TEMPERATURE: 97.8 F | RESPIRATION RATE: 18 BRPM | SYSTOLIC BLOOD PRESSURE: 107 MMHG | DIASTOLIC BLOOD PRESSURE: 66 MMHG | BODY MASS INDEX: 18.29 KG/M2

## 2023-10-18 DIAGNOSIS — G20.B2 PARKINSON'S DISEASE WITH DYSKINESIA AND FLUCTUATING MANIFESTATIONS (MULTI): Primary | ICD-10-CM

## 2023-10-18 DIAGNOSIS — R53.81 DEBILITY: ICD-10-CM

## 2023-10-18 DIAGNOSIS — F03.90 DEMENTIA WITHOUT BEHAVIORAL DISTURBANCE (MULTI): ICD-10-CM

## 2023-10-18 DIAGNOSIS — R64 CACHEXIA (MULTI): ICD-10-CM

## 2023-10-18 PROCEDURE — 99309 SBSQ NF CARE MODERATE MDM 30: CPT | Performed by: NURSE PRACTITIONER

## 2023-10-18 NOTE — PROGRESS NOTES
Subjective   Snehal Souza is a 73 y.o. female Here for routine monthly visit.    HPI There are no new problems and multiple health problems have been reviewed.  The course has been unchanged.  The patient  is moderately to severely  confused.   There are no family members present during time of visit.    she  is sitting up in chair,  denies any complaints when asked.  Eating and drinking well.   No concerns per staff.        Review of Systems   Constitutional:         Difficult to obtain due to dementia, denies any complaints when asked.        Objective   /66   Pulse 68   Temp 36.6 °C (97.8 °F)   Resp 18   Wt 45.4 kg (100 lb)   SpO2 94%   BMI 18.29 kg/m²     Physical Exam  Constitutional:       General: She is not in acute distress.     Comments: Frail, cachectic elderly female sitting up in chair, no apparent distress   HENT:      Head: Normocephalic and atraumatic.   Eyes:      Conjunctiva/sclera: Conjunctivae normal.   Cardiovascular:      Rate and Rhythm: Normal rate and regular rhythm.   Pulmonary:      Effort: Pulmonary effort is normal. No respiratory distress.      Breath sounds: Normal breath sounds.   Abdominal:      General: Bowel sounds are normal. There is no distension.      Palpations: Abdomen is soft.      Tenderness: There is no abdominal tenderness.   Musculoskeletal:      Right lower leg: No edema.      Left lower leg: No edema.      Comments: diffusely decreased muscle mass     Skin:     General: Skin is warm and dry.   Neurological:      Mental Status: She is alert.      Comments: Oriented times 1   Psychiatric:         Mood and Affect: Mood normal.         Behavior: Behavior normal.         Assessment/Plan   Problem List Items Addressed This Visit       Dementia without behavioral disturbance (CMS/HCC)     Oriented times 1, no acute changes.   staff to monitor and notify for any changes.           Cachexia (CMS/HCC)     Thin, frail.  Monitor weights and oral intake.           Debility     Requires assistance with ADL's.           Parkinson disease - Primary     On sinemet.  continue with current medical management          labs/meds/orders reviewed  staff to monitor and notify for any changes.  continue with supportive and restorative care  next labs 12/23 and prn

## 2023-10-18 NOTE — LETTER
Patient: Snehal Souza  : 1950    Encounter Date: 10/18/2023    Subjective  Snehal Souza is a 73 y.o. female Here for routine monthly visit.    HPI There are no new problems and multiple health problems have been reviewed.  The course has been unchanged.  The patient  is moderately to severely  confused.   There are no family members present during time of visit.    she  is sitting up in chair,  denies any complaints when asked.  Eating and drinking well.   No concerns per staff.        Review of Systems   Constitutional:         Difficult to obtain due to dementia, denies any complaints when asked.        Objective  /66   Pulse 68   Temp 36.6 °C (97.8 °F)   Resp 18   Wt 45.4 kg (100 lb)   SpO2 94%   BMI 18.29 kg/m²     Physical Exam  Constitutional:       General: She is not in acute distress.     Comments: Frail, cachectic elderly female sitting up in chair, no apparent distress   HENT:      Head: Normocephalic and atraumatic.   Eyes:      Conjunctiva/sclera: Conjunctivae normal.   Cardiovascular:      Rate and Rhythm: Normal rate and regular rhythm.   Pulmonary:      Effort: Pulmonary effort is normal. No respiratory distress.      Breath sounds: Normal breath sounds.   Abdominal:      General: Bowel sounds are normal. There is no distension.      Palpations: Abdomen is soft.      Tenderness: There is no abdominal tenderness.   Musculoskeletal:      Right lower leg: No edema.      Left lower leg: No edema.      Comments: diffusely decreased muscle mass     Skin:     General: Skin is warm and dry.   Neurological:      Mental Status: She is alert.      Comments: Oriented times 1   Psychiatric:         Mood and Affect: Mood normal.         Behavior: Behavior normal.         Assessment/Plan  Problem List Items Addressed This Visit       Dementia without behavioral disturbance (CMS/HCC)     Oriented times 1, no acute changes.   staff to monitor and notify for any changes.           Cachexia  (CMS/[facility])     Thin, frail.  Monitor weights and oral intake.          Debility     Requires assistance with ADL's.           Parkinson disease - Primary     On sinemet.  continue with current medical management          labs/meds/orders reviewed  staff to monitor and notify for any changes.  continue with supportive and restorative care  next labs 12/23 and prn      Electronically Signed By: RADHA Cabello-JASON   10/18/23  2:38 PM

## 2023-10-18 NOTE — ASSESSMENT & PLAN NOTE
>>ASSESSMENT AND PLAN FOR PARKINSON DISEASE (MULTI) WRITTEN ON 10/18/2023  2:32 PM BY AASHISH PORTER    On sinemet.  continue with current medical management

## 2023-10-27 ENCOUNTER — NURSING HOME VISIT (OUTPATIENT)
Dept: POST ACUTE CARE | Facility: EXTERNAL LOCATION | Age: 73
End: 2023-10-27
Payer: MEDICARE

## 2023-10-27 DIAGNOSIS — R53.81 DEBILITY: ICD-10-CM

## 2023-10-27 DIAGNOSIS — G20.B2 PARKINSON'S DISEASE WITH DYSKINESIA AND FLUCTUATING MANIFESTATIONS (MULTI): Primary | ICD-10-CM

## 2023-10-27 DIAGNOSIS — F03.90 DEMENTIA WITHOUT BEHAVIORAL DISTURBANCE (MULTI): ICD-10-CM

## 2023-10-27 DIAGNOSIS — R64 CACHEXIA (MULTI): ICD-10-CM

## 2023-10-27 PROCEDURE — 99308 SBSQ NF CARE LOW MDM 20: CPT | Performed by: INTERNAL MEDICINE

## 2023-10-27 NOTE — LETTER
Patient: Snehal Souza  : 1950    Encounter Date: 10/27/2023    Subjective  Patient ID: Snehal Souza is a 73 y.o. female who is long term resident being seen and evaluated for multiple medical problems.    HPI   73-year-old female patient resting comfortably in her recliner chair in no distress.  She has no complaints of pain or shortness of breath for me.  Nursing has no new adverse events reported to me.    Current high risk medication:  Sinemet  Namenda    Laboratory examination from 2023:  Potassium 4.0  Bicarbonate 20  Creatinine 0.5  Albumin 4.0  Liver injury test normal  Hemoglobin 13  Vitamin B12 675  TSH 1.38  Vitamin D 23    Review of Systems   Constitutional:         Difficult to obtain due to dementia, denies any complaints when asked.        Objective  /66   Pulse 82   Temp 36.3 °C (97.3 °F)   Resp 18   Wt 45.4 kg (100 lb)   SpO2 95%   BMI 18.29 kg/m²     Physical Exam  Constitutional:       General: She is not in acute distress.     Comments: Frail, cachectic elderly female sitting up in chair, no apparent distress   HENT:      Head: Normocephalic and atraumatic.      Mouth/Throat:      Mouth: Mucous membranes are moist.   Eyes:      Conjunctiva/sclera: Conjunctivae normal.   Cardiovascular:      Rate and Rhythm: Normal rate and regular rhythm.   Pulmonary:      Effort: Pulmonary effort is normal. No respiratory distress.      Breath sounds: Normal breath sounds.   Abdominal:      General: Bowel sounds are normal. There is no distension.      Palpations: Abdomen is soft.      Tenderness: There is no abdominal tenderness.   Musculoskeletal:      Right lower leg: No edema.      Left lower leg: No edema.      Comments: diffusely decreased muscle mass     Skin:     General: Skin is warm and dry.   Neurological:      Mental Status: She is alert.      Comments: Oriented times 1   Psychiatric:         Mood and Affect: Mood normal.         Behavior: Behavior normal.          Assessment/Plan  Problem List Items Addressed This Visit             ICD-10-CM    Dementia without behavioral disturbance (CMS/HCC) F03.90    Cachexia (CMS/HCC) R64    Debility R53.81    Parkinson disease - Primary G20.A1     A.  We will continue with restorative and supportive care as the patient tolerates    B.  Laboratory examinations will next be due in December 2023 or as needed    C.  The patient's prognosis is guarded.        Electronically Signed By: Luis Fernando Zelaya MD   10/31/23 10:29 AM

## 2023-10-28 VITALS
RESPIRATION RATE: 18 BRPM | OXYGEN SATURATION: 95 % | TEMPERATURE: 97.3 F | WEIGHT: 100 LBS | SYSTOLIC BLOOD PRESSURE: 108 MMHG | DIASTOLIC BLOOD PRESSURE: 66 MMHG | BODY MASS INDEX: 18.29 KG/M2 | HEART RATE: 82 BPM

## 2023-10-28 NOTE — PROGRESS NOTES
Subjective   Patient ID: Snehal Souza is a 73 y.o. female who is long term resident being seen and evaluated for multiple medical problems.    HPI   73-year-old female patient resting comfortably in her recliner chair in no distress.  She has no complaints of pain or shortness of breath for me.  Nursing has no new adverse events reported to me.    Current high risk medication:  Sinemet  Namenda    Laboratory examination from June 2023:  Potassium 4.0  Bicarbonate 20  Creatinine 0.5  Albumin 4.0  Liver injury test normal  Hemoglobin 13  Vitamin B12 675  TSH 1.38  Vitamin D 23    Review of Systems   Constitutional:         Difficult to obtain due to dementia, denies any complaints when asked.        Objective   /66   Pulse 82   Temp 36.3 °C (97.3 °F)   Resp 18   Wt 45.4 kg (100 lb)   SpO2 95%   BMI 18.29 kg/m²     Physical Exam  Constitutional:       General: She is not in acute distress.     Comments: Frail, cachectic elderly female sitting up in chair, no apparent distress   HENT:      Head: Normocephalic and atraumatic.      Mouth/Throat:      Mouth: Mucous membranes are moist.   Eyes:      Conjunctiva/sclera: Conjunctivae normal.   Cardiovascular:      Rate and Rhythm: Normal rate and regular rhythm.   Pulmonary:      Effort: Pulmonary effort is normal. No respiratory distress.      Breath sounds: Normal breath sounds.   Abdominal:      General: Bowel sounds are normal. There is no distension.      Palpations: Abdomen is soft.      Tenderness: There is no abdominal tenderness.   Musculoskeletal:      Right lower leg: No edema.      Left lower leg: No edema.      Comments: diffusely decreased muscle mass     Skin:     General: Skin is warm and dry.   Neurological:      Mental Status: She is alert.      Comments: Oriented times 1   Psychiatric:         Mood and Affect: Mood normal.         Behavior: Behavior normal.         Assessment/Plan   Problem List Items Addressed This Visit             ICD-10-CM     Dementia without behavioral disturbance (CMS/HCC) F03.90    Cachexia (CMS/HCC) R64    Debility R53.81    Parkinson disease - Primary G20.A1     A.  We will continue with restorative and supportive care as the patient tolerates    B.  Laboratory examinations will next be due in December 2023 or as needed    C.  The patient's prognosis is guarded.

## 2023-11-27 ENCOUNTER — NURSING HOME VISIT (OUTPATIENT)
Dept: POST ACUTE CARE | Facility: EXTERNAL LOCATION | Age: 73
End: 2023-11-27
Payer: MEDICARE

## 2023-11-27 DIAGNOSIS — G20.B2 PARKINSON'S DISEASE WITH DYSKINESIA AND FLUCTUATING MANIFESTATIONS (MULTI): Primary | ICD-10-CM

## 2023-11-27 DIAGNOSIS — R53.81 DEBILITY: ICD-10-CM

## 2023-11-27 DIAGNOSIS — F03.90 DEMENTIA WITHOUT BEHAVIORAL DISTURBANCE (MULTI): ICD-10-CM

## 2023-11-27 PROCEDURE — 99308 SBSQ NF CARE LOW MDM 20: CPT | Performed by: INTERNAL MEDICINE

## 2023-11-27 NOTE — LETTER
Patient: Snehal Souza  : 1950    Encounter Date: 2023    Subjective  Patient ID: Snehal Souza is a 73 y.o. female who is long term resident being seen and evaluated for multiple medical problems.    HPI   73-year-old female patient is resting comfortably in her bed in no distress.  She appears comfortable and nursing has no new adverse events reported to me.    The patient's medication list has been reviewed in detail by me.    Laboratory examinations from May and 2023 have been reviewed in detail by me.    Review of Systems   Constitutional:         Difficult to obtain due to dementia, denies any complaints when asked.        Objective  /79   Pulse 66   Temp 36.2 °C (97.1 °F)   Resp 18   Wt 45.8 kg (101 lb)   SpO2 97%   BMI 18.47 kg/m²     Physical Exam  Constitutional:       General: She is not in acute distress.     Comments: Frail, cachectic elderly female sitting up in chair, no apparent distress   HENT:      Head: Normocephalic and atraumatic.      Mouth/Throat:      Mouth: Mucous membranes are moist.   Eyes:      Conjunctiva/sclera: Conjunctivae normal.   Cardiovascular:      Rate and Rhythm: Normal rate and regular rhythm.   Pulmonary:      Effort: Pulmonary effort is normal. No respiratory distress.      Breath sounds: Normal breath sounds.   Abdominal:      General: Bowel sounds are normal. There is no distension.      Palpations: Abdomen is soft.      Tenderness: There is no abdominal tenderness.   Musculoskeletal:      Right lower leg: No edema.      Left lower leg: No edema.      Comments: diffusely decreased muscle mass     Skin:     General: Skin is warm and dry.   Neurological:      Mental Status: She is alert.      Comments: Oriented times 1   Psychiatric:         Mood and Affect: Mood normal.         Behavior: Behavior normal.         Assessment/Plan  Problem List Items Addressed This Visit             ICD-10-CM    Dementia without behavioral disturbance (CMS/HCC)  F03.90    Debility R53.81    Parkinson disease - Primary G20.A1       A.  We will continue with restorative and supportive care as the patient tolerates    B.  Laboratory examinations will next be due in December 2023 including vitamin D level    C.  The patient's prognosis is [id number].      Electronically Signed By: Luis Fernando Zelaya MD   11/30/23  2:17 PM

## 2023-11-28 VITALS
BODY MASS INDEX: 18.47 KG/M2 | SYSTOLIC BLOOD PRESSURE: 116 MMHG | DIASTOLIC BLOOD PRESSURE: 79 MMHG | OXYGEN SATURATION: 97 % | RESPIRATION RATE: 18 BRPM | HEART RATE: 66 BPM | WEIGHT: 101 LBS | TEMPERATURE: 97.1 F

## 2023-11-28 NOTE — PROGRESS NOTES
Subjective   Patient ID: Snehal Souza is a 73 y.o. female who is long term resident being seen and evaluated for multiple medical problems.    HPI   73-year-old female patient is resting comfortably in her bed in no distress.  She appears comfortable and nursing has no new adverse events reported to me.    The patient's medication list has been reviewed in detail by me.    Laboratory examinations from May and June 2023 have been reviewed in detail by me.    Review of Systems   Constitutional:         Difficult to obtain due to dementia, denies any complaints when asked.        Objective   /79   Pulse 66   Temp 36.2 °C (97.1 °F)   Resp 18   Wt 45.8 kg (101 lb)   SpO2 97%   BMI 18.47 kg/m²     Physical Exam  Constitutional:       General: She is not in acute distress.     Comments: Frail, cachectic elderly female sitting up in chair, no apparent distress   HENT:      Head: Normocephalic and atraumatic.      Mouth/Throat:      Mouth: Mucous membranes are moist.   Eyes:      Conjunctiva/sclera: Conjunctivae normal.   Cardiovascular:      Rate and Rhythm: Normal rate and regular rhythm.   Pulmonary:      Effort: Pulmonary effort is normal. No respiratory distress.      Breath sounds: Normal breath sounds.   Abdominal:      General: Bowel sounds are normal. There is no distension.      Palpations: Abdomen is soft.      Tenderness: There is no abdominal tenderness.   Musculoskeletal:      Right lower leg: No edema.      Left lower leg: No edema.      Comments: diffusely decreased muscle mass     Skin:     General: Skin is warm and dry.   Neurological:      Mental Status: She is alert.      Comments: Oriented times 1   Psychiatric:         Mood and Affect: Mood normal.         Behavior: Behavior normal.         Assessment/Plan   Problem List Items Addressed This Visit             ICD-10-CM    Dementia without behavioral disturbance (CMS/HCC) F03.90    Debility R53.81    Parkinson disease - Primary G20.A1        A.  We will continue with restorative and supportive care as the patient tolerates    B.  Laboratory examinations will next be due in December 2023 including vitamin D level    C.  The patient's prognosis is [id number].

## 2023-12-20 ENCOUNTER — NURSING HOME VISIT (OUTPATIENT)
Dept: POST ACUTE CARE | Facility: EXTERNAL LOCATION | Age: 73
End: 2023-12-20
Payer: MEDICARE

## 2023-12-20 VITALS
WEIGHT: 98 LBS | SYSTOLIC BLOOD PRESSURE: 110 MMHG | DIASTOLIC BLOOD PRESSURE: 74 MMHG | RESPIRATION RATE: 18 BRPM | BODY MASS INDEX: 17.92 KG/M2 | OXYGEN SATURATION: 96 % | HEART RATE: 76 BPM | TEMPERATURE: 98.3 F

## 2023-12-20 DIAGNOSIS — R53.81 DEBILITY: ICD-10-CM

## 2023-12-20 DIAGNOSIS — R64 CACHEXIA (MULTI): ICD-10-CM

## 2023-12-20 DIAGNOSIS — E55.9 VITAMIN D DEFICIENCY: ICD-10-CM

## 2023-12-20 DIAGNOSIS — F03.90 DEMENTIA WITHOUT BEHAVIORAL DISTURBANCE (MULTI): Primary | ICD-10-CM

## 2023-12-20 PROCEDURE — 99309 SBSQ NF CARE MODERATE MDM 30: CPT | Performed by: NURSE PRACTITIONER

## 2023-12-20 NOTE — ASSESSMENT & PLAN NOTE
OTHER: PATIENT HAS BEEN CLEARED BY [facility] AND [facility] - FOR CATARAT SURGERY IN THE NEAR FUTURE. Thin, frail.  Monitor weights and oral intake.

## 2023-12-20 NOTE — LETTER
Patient: Snehal Souza  : 1950    Encounter Date: 2023    Subjective  Snehal Souza is a 73 y.o. female Here for routine monthly visit.    HPI There are no new problems and multiple health problems have been reviewed.  The course has been unchanged.  The patient  is moderately to severely  confused.   There are no family members present during time of visit.    she  is sitting up in chair,  denies any complaints when asked.  Eating and drinking well.   No concerns per staff.        Review of Systems   Constitutional:         Difficult to obtain due to dementia, denies any complaints when asked.        Objective  /74   Pulse 76   Temp 36.8 °C (98.3 °F)   Resp 18   Wt (!) 44.5 kg (98 lb)   SpO2 96%   BMI 17.92 kg/m²     Physical Exam  Constitutional:       General: She is not in acute distress.     Comments: Frail, cachectic elderly female sitting up in chair, no apparent distress   HENT:      Head: Normocephalic and atraumatic.   Eyes:      Conjunctiva/sclera: Conjunctivae normal.   Cardiovascular:      Rate and Rhythm: Normal rate and regular rhythm.   Pulmonary:      Effort: Pulmonary effort is normal. No respiratory distress.      Breath sounds: Normal breath sounds.   Abdominal:      General: Bowel sounds are normal. There is no distension.      Palpations: Abdomen is soft.      Tenderness: There is no abdominal tenderness.   Musculoskeletal:      Right lower leg: No edema.      Left lower leg: No edema.      Comments: diffusely decreased muscle mass     Skin:     General: Skin is warm and dry.   Neurological:      Mental Status: She is alert.      Comments: Oriented times 1   Psychiatric:         Mood and Affect: Mood normal.         Behavior: Behavior normal.         Assessment/Plan  Problem List Items Addressed This Visit       Dementia without behavioral disturbance (CMS/HCC) - Primary     Oriented times 1, no acute changes.   staff to monitor and notify for any changes.            Cachexia (CMS/HCC)     Thin, frail.  Monitor weights and oral intake.          Debility     Requires assistance with ADL's.           Vitamin D deficiency     On supplement, monitor with routine labs        labs/meds/orders reviewed  staff to monitor and notify for any changes.  continue with supportive and restorative care  Due for labs, ordered.       Electronically Signed By: AASHISH Cabello   12/20/23  3:13 PM

## 2023-12-20 NOTE — PROGRESS NOTES
Subjective   Snehal Souza is a 73 y.o. female Here for routine monthly visit.    HPI There are no new problems and multiple health problems have been reviewed.  The course has been unchanged.  The patient  is moderately to severely  confused.   There are no family members present during time of visit.    she  is sitting up in chair,  denies any complaints when asked.  Eating and drinking well.   No concerns per staff.        Review of Systems   Constitutional:         Difficult to obtain due to dementia, denies any complaints when asked.        Objective   /74   Pulse 76   Temp 36.8 °C (98.3 °F)   Resp 18   Wt (!) 44.5 kg (98 lb)   SpO2 96%   BMI 17.92 kg/m²     Physical Exam  Constitutional:       General: She is not in acute distress.     Comments: Frail, cachectic elderly female sitting up in chair, no apparent distress   HENT:      Head: Normocephalic and atraumatic.   Eyes:      Conjunctiva/sclera: Conjunctivae normal.   Cardiovascular:      Rate and Rhythm: Normal rate and regular rhythm.   Pulmonary:      Effort: Pulmonary effort is normal. No respiratory distress.      Breath sounds: Normal breath sounds.   Abdominal:      General: Bowel sounds are normal. There is no distension.      Palpations: Abdomen is soft.      Tenderness: There is no abdominal tenderness.   Musculoskeletal:      Right lower leg: No edema.      Left lower leg: No edema.      Comments: diffusely decreased muscle mass     Skin:     General: Skin is warm and dry.   Neurological:      Mental Status: She is alert.      Comments: Oriented times 1   Psychiatric:         Mood and Affect: Mood normal.         Behavior: Behavior normal.         Assessment/Plan   Problem List Items Addressed This Visit       Dementia without behavioral disturbance (CMS/HCC) - Primary     Oriented times 1, no acute changes.   staff to monitor and notify for any changes.           Cachexia (CMS/HCC)     Thin, frail.  Monitor weights and oral intake.           Debility     Requires assistance with ADL's.           Vitamin D deficiency     On supplement, monitor with routine labs        labs/meds/orders reviewed  staff to monitor and notify for any changes.  continue with supportive and restorative care  Due for labs, ordered.

## 2024-01-12 ENCOUNTER — NURSING HOME VISIT (OUTPATIENT)
Dept: POST ACUTE CARE | Facility: EXTERNAL LOCATION | Age: 74
End: 2024-01-12
Payer: COMMERCIAL

## 2024-01-12 VITALS
WEIGHT: 97 LBS | OXYGEN SATURATION: 95 % | HEART RATE: 70 BPM | BODY MASS INDEX: 17.74 KG/M2 | DIASTOLIC BLOOD PRESSURE: 78 MMHG | TEMPERATURE: 98.4 F | RESPIRATION RATE: 18 BRPM | SYSTOLIC BLOOD PRESSURE: 117 MMHG

## 2024-01-12 DIAGNOSIS — F03.90 DEMENTIA WITHOUT BEHAVIORAL DISTURBANCE (MULTI): ICD-10-CM

## 2024-01-12 DIAGNOSIS — G20.B2 PARKINSON'S DISEASE WITH DYSKINESIA AND FLUCTUATING MANIFESTATIONS (MULTI): Primary | ICD-10-CM

## 2024-01-12 DIAGNOSIS — R64 CACHEXIA (MULTI): ICD-10-CM

## 2024-01-12 DIAGNOSIS — R53.81 DEBILITY: ICD-10-CM

## 2024-01-12 PROCEDURE — 99308 SBSQ NF CARE LOW MDM 20: CPT | Performed by: INTERNAL MEDICINE

## 2024-01-12 NOTE — PROGRESS NOTES
Subjective   Patient ID: Snehal Souza is a 73 y.o. female who is long term resident being seen and evaluated for multiple medical problems.    HPI   This 73-year-old female patient is resting comfortably in her bed in no distress.  She awakens easily and denies pain to me at this time.  Nursing has no new adverse events reportedly.    Current high risk medication:  Sinemet  Namenda    Laboratory examination from December 21, 2023:  CMP and CBC normal  Albumin 4.2      Review of Systems   Constitutional:         Difficult to obtain due to dementia, denies any complaints when asked.        Objective   /78   Pulse 70   Temp 36.9 °C (98.4 °F)   Resp 18   Wt (!) 44 kg (97 lb)   SpO2 95%   BMI 17.74 kg/m²     Physical Exam  Constitutional:       General: She is not in acute distress.     Comments: Frail, cachectic elderly female sitting up in chair, no apparent distress   HENT:      Head: Normocephalic and atraumatic.   Eyes:      Conjunctiva/sclera: Conjunctivae normal.   Cardiovascular:      Rate and Rhythm: Normal rate and regular rhythm.   Pulmonary:      Effort: Pulmonary effort is normal. No respiratory distress.      Breath sounds: Normal breath sounds.   Abdominal:      General: Bowel sounds are normal. There is no distension.      Palpations: Abdomen is soft.      Tenderness: There is no abdominal tenderness.   Musculoskeletal:      Right lower leg: No edema.      Left lower leg: No edema.      Comments: diffusely decreased muscle mass     Skin:     General: Skin is warm and dry.   Neurological:      Mental Status: She is alert.      Comments: Oriented times 1   Psychiatric:         Mood and Affect: Mood normal.         Behavior: Behavior normal.         Assessment/Plan   Problem List Items Addressed This Visit             ICD-10-CM    Dementia without behavioral disturbance (CMS/HCC) F03.90    Cachexia (CMS/HCC) R64    Debility R53.81    Parkinson disease - Primary G20.A1     8.  We will continue  with restorative and supportive care as the patient tolerates    B.  Laboratory examinations will next be due in June 2024 or as needed    C.  The patient's prognosis is guarded.

## 2024-01-12 NOTE — LETTER
Patient: Snehal Souza  : 1950    Encounter Date: 2024    Subjective  Patient ID: Snehal Souza is a 73 y.o. female who is long term resident being seen and evaluated for multiple medical problems.    HPI   This 73-year-old female patient is resting comfortably in her bed in no distress.  She awakens easily and denies pain to me at this time.  Nursing has no new adverse events reportedly.    Current high risk medication:  Sinemet  Namenda    Laboratory examination from 2023:  CMP and CBC normal  Albumin 4.2      Review of Systems   Constitutional:         Difficult to obtain due to dementia, denies any complaints when asked.        Objective  /78   Pulse 70   Temp 36.9 °C (98.4 °F)   Resp 18   Wt (!) 44 kg (97 lb)   SpO2 95%   BMI 17.74 kg/m²     Physical Exam  Constitutional:       General: She is not in acute distress.     Comments: Frail, cachectic elderly female sitting up in chair, no apparent distress   HENT:      Head: Normocephalic and atraumatic.   Eyes:      Conjunctiva/sclera: Conjunctivae normal.   Cardiovascular:      Rate and Rhythm: Normal rate and regular rhythm.   Pulmonary:      Effort: Pulmonary effort is normal. No respiratory distress.      Breath sounds: Normal breath sounds.   Abdominal:      General: Bowel sounds are normal. There is no distension.      Palpations: Abdomen is soft.      Tenderness: There is no abdominal tenderness.   Musculoskeletal:      Right lower leg: No edema.      Left lower leg: No edema.      Comments: diffusely decreased muscle mass     Skin:     General: Skin is warm and dry.   Neurological:      Mental Status: She is alert.      Comments: Oriented times 1   Psychiatric:         Mood and Affect: Mood normal.         Behavior: Behavior normal.         Assessment/Plan  Problem List Items Addressed This Visit             ICD-10-CM    Dementia without behavioral disturbance (CMS/HCC) F03.90    Cachexia (CMS/HCC) R64    Debility  R53.81    Parkinson disease - Primary G20.A1     8.  We will continue with restorative and supportive care as the patient tolerates    B.  Laboratory examinations will next be due in June 2024 or as needed    C.  The patient's prognosis is guarded.                 Electronically Signed By: Luis Fernando Zelaya MD   1/22/24  4:43 PM

## 2024-02-04 ENCOUNTER — HOSPITAL ENCOUNTER (EMERGENCY)
Facility: HOSPITAL | Age: 74
Discharge: HOME | End: 2024-02-04
Attending: STUDENT IN AN ORGANIZED HEALTH CARE EDUCATION/TRAINING PROGRAM
Payer: COMMERCIAL

## 2024-02-04 ENCOUNTER — APPOINTMENT (OUTPATIENT)
Dept: RADIOLOGY | Facility: HOSPITAL | Age: 74
End: 2024-02-04
Payer: COMMERCIAL

## 2024-02-04 VITALS
HEART RATE: 101 BPM | OXYGEN SATURATION: 96 % | RESPIRATION RATE: 18 BRPM | SYSTOLIC BLOOD PRESSURE: 118 MMHG | WEIGHT: 115 LBS | BODY MASS INDEX: 18.48 KG/M2 | TEMPERATURE: 97.7 F | DIASTOLIC BLOOD PRESSURE: 69 MMHG | HEIGHT: 66 IN

## 2024-02-04 DIAGNOSIS — S01.01XA LACERATION OF SCALP, INITIAL ENCOUNTER: ICD-10-CM

## 2024-02-04 DIAGNOSIS — S09.90XA CLOSED HEAD INJURY, INITIAL ENCOUNTER: Primary | ICD-10-CM

## 2024-02-04 PROCEDURE — 12001 RPR S/N/AX/GEN/TRNK 2.5CM/<: CPT | Performed by: STUDENT IN AN ORGANIZED HEALTH CARE EDUCATION/TRAINING PROGRAM

## 2024-02-04 PROCEDURE — 72125 CT NECK SPINE W/O DYE: CPT | Performed by: RADIOLOGY

## 2024-02-04 PROCEDURE — 70450 CT HEAD/BRAIN W/O DYE: CPT

## 2024-02-04 PROCEDURE — 99285 EMERGENCY DEPT VISIT HI MDM: CPT | Mod: 25 | Performed by: STUDENT IN AN ORGANIZED HEALTH CARE EDUCATION/TRAINING PROGRAM

## 2024-02-04 PROCEDURE — 2500000001 HC RX 250 WO HCPCS SELF ADMINISTERED DRUGS (ALT 637 FOR MEDICARE OP): Performed by: STUDENT IN AN ORGANIZED HEALTH CARE EDUCATION/TRAINING PROGRAM

## 2024-02-04 PROCEDURE — 70450 CT HEAD/BRAIN W/O DYE: CPT | Performed by: RADIOLOGY

## 2024-02-04 PROCEDURE — 72125 CT NECK SPINE W/O DYE: CPT

## 2024-02-04 RX ADMIN — Medication 1 APPLICATION: at 14:10

## 2024-02-04 ASSESSMENT — PAIN - FUNCTIONAL ASSESSMENT
PAIN_FUNCTIONAL_ASSESSMENT: 0-10
PAIN_FUNCTIONAL_ASSESSMENT: 0-10

## 2024-02-04 ASSESSMENT — PAIN SCALES - GENERAL
PAINLEVEL_OUTOF10: 0 - NO PAIN
PAINLEVEL_OUTOF10: 0 - NO PAIN

## 2024-02-04 NOTE — ED NOTES
Pt was in wheelchair aid turned around and pt was face down on floor. -LOC, - thinners. Pt has dementia is axox1. Moves all extremities. Had scalp laceration left sided on hair line     Sarai Botello RN  02/04/24 [id number]

## 2024-02-04 NOTE — DISCHARGE INSTRUCTIONS
Keep the wound on Snehal's head clean with soap and water.  The sutures should dissolve on their own within the next week.  If you notice increased swelling, redness surrounding the wound or drainage from the wound return to the ER for evaluation.  Place ice to reduce swelling.  You can expect to see increased bruising on her face over the next few days, this is normal.  If she has changes in mental status, vomiting or other concerning symptoms return to the ER for evaluation.

## 2024-02-04 NOTE — ED PROVIDER NOTES
HPI   Chief Complaint   Patient presents with    Fall    Head Injury    Arm Injury       73-year-old female presenting to the ED for a fall at her nursing facility.  According to daughter patient had leaned forward in her chair to grab something, lost her balance and fell forward.  Nursing staff heard her fall and immediately came into the room.  They deny LOC.  She is not anticoagulated.  She has Lewy body dementia and is at her baseline per family.  She sustained a laceration to the forehead near the hairline.  No vomiting or other signs of injury.  Tetanus is up-to-date.                          [place] Coma Scale Score: 13                  Patient History   Past Medical History:   Diagnosis Date    Anxiety disorder, unspecified 2016    Anxiety    Personal history of other diseases of the musculoskeletal system and connective tissue 2018    History of osteopenia     Past Surgical History:   Procedure Laterality Date    BREAST SURGERY  2018    Breast Surgery     SECTION, CLASSIC  2016     Section     No family history on file.  Social History     Tobacco Use    Smoking status: Unknown    Smokeless tobacco: Not on file   Substance Use Topics    Alcohol use: Not on file    Drug use: Not on file       Physical Exam   ED Triage Vitals   Temperature Heart Rate Respirations BP   24 1221 24 1221 24 1413 24 1221   36.5 °C (97.7 °F) 81 16 142/68      Pulse Ox Temp Source Heart Rate Source Patient Position   24 1221 24 1221 -- --   95 % Temporal        BP Location FiO2 (%)     -- --             Physical Exam  Vitals and nursing note reviewed.   Constitutional:       General: She is not in acute distress.     Appearance: She is not ill-appearing or toxic-appearing.   HENT:      Head: Normocephalic.      Comments: 2 cm laceration to the mid forehead near the hairline     Nose: Nose normal.      Mouth/Throat:      Mouth: Mucous membranes are moist.    Eyes:      Extraocular Movements: Extraocular movements intact.      Conjunctiva/sclera: Conjunctivae normal.      Pupils: Pupils are equal, round, and reactive to light.   Cardiovascular:      Rate and Rhythm: Normal rate and regular rhythm.      Pulses: Normal pulses.      Heart sounds: Normal heart sounds.   Pulmonary:      Effort: Pulmonary effort is normal.      Breath sounds: Normal breath sounds.   Abdominal:      General: There is no distension.      Palpations: Abdomen is soft.      Tenderness: There is no abdominal tenderness.   Musculoskeletal:         General: No tenderness. Normal range of motion.      Cervical back: Normal range of motion and neck supple. No tenderness.      Right lower leg: No edema.      Left lower leg: No edema.   Skin:     General: Skin is warm and dry.      Capillary Refill: Capillary refill takes less than 2 seconds.   Neurological:      General: No focal deficit present.      Mental Status: She is alert. Mental status is at baseline.         ED Course & MDM   Diagnoses as of 02/04/24 1615   Closed head injury, initial encounter   Laceration of scalp, initial encounter       Medical Decision Making  73-year-old female presenting to the ED after mechanical fall.  She is at her baseline and is hemodynamically stable.  She has a 2 similar laceration to the scalp.  Her tetanus is up-to-date.    CT head negative for acute cranial bleeding and CT C-spine negative for acute traumatic injuries.  Let was placed over the wound.  It was irrigated and cleaned and repaired using two 5-0 fast-absorbing gut sutures.  Absorbable sutures were used given the location in the hairline and concern for difficulty with suture removal.  She tolerated this well.  Daughter was given wound care instructions and return precautions.  These were also sent with the patient and her discharge instructions upon discharge to her nursing facility.        Procedure  Laceration Repair    Performed by: Toney Ellis    Authorized by: Alfred Vidal DO    Consent:     Consent obtained:  Verbal    Consent given by: Daughter.    Risks, benefits, and alternatives were discussed: yes      Risks discussed:  Infection    Alternatives discussed:  No treatment  Anesthesia:     Anesthesia method:  Topical application    Topical anesthetic:  LET  Laceration details:     Location:  Scalp    Scalp location:  Frontal    Length (cm):  2  Treatment:     Area cleansed with:  Saline    Amount of cleaning:  Standard    Irrigation solution:  Sterile saline    Irrigation method:  Syringe    Visualized foreign bodies/material removed: no    Skin repair:     Repair method:  Sutures    Suture size:  5-0    Suture material:  Fast-absorbing gut    Suture technique:  Simple interrupted    Number of sutures:  2  Approximation:     Approximation:  Close  Repair type:     Repair type:  Simple  Post-procedure details:     Dressing:  Open (no dressing)    Procedure completion:  Tolerated well, no immediate complications       Toney Ellis DO  Resident  02/04/24 [id number]

## 2024-02-15 ENCOUNTER — LAB REQUISITION (OUTPATIENT)
Dept: LAB | Facility: HOSPITAL | Age: 74
End: 2024-02-15
Payer: COMMERCIAL

## 2024-02-15 DIAGNOSIS — D64.9 ANEMIA, UNSPECIFIED: ICD-10-CM

## 2024-02-15 DIAGNOSIS — G31.83 NEUROCOGNITIVE DISORDER WITH LEWY BODIES (CODE) (MULTI): ICD-10-CM

## 2024-02-15 DIAGNOSIS — E43 UNSPECIFIED SEVERE PROTEIN-CALORIE MALNUTRITION (MULTI): ICD-10-CM

## 2024-02-15 DIAGNOSIS — I95.9 HYPOTENSION, UNSPECIFIED: ICD-10-CM

## 2024-02-15 LAB
ANION GAP SERPL CALC-SCNC: 12 MMOL/L (ref 10–20)
BUN SERPL-MCNC: 22 MG/DL (ref 6–23)
CALCIUM SERPL-MCNC: 8.9 MG/DL (ref 8.6–10.3)
CHLORIDE SERPL-SCNC: 106 MMOL/L (ref 98–107)
CO2 SERPL-SCNC: 28 MMOL/L (ref 21–32)
CREAT SERPL-MCNC: 0.55 MG/DL (ref 0.5–1.05)
EGFRCR SERPLBLD CKD-EPI 2021: >90 ML/MIN/1.73M*2
ERYTHROCYTE [DISTWIDTH] IN BLOOD BY AUTOMATED COUNT: 13.6 % (ref 11.5–14.5)
GLUCOSE SERPL-MCNC: 130 MG/DL (ref 74–99)
HCT VFR BLD AUTO: 37.8 % (ref 36–46)
HGB BLD-MCNC: 12.1 G/DL (ref 12–16)
MCH RBC QN AUTO: 29.7 PG (ref 26–34)
MCHC RBC AUTO-ENTMCNC: 32 G/DL (ref 32–36)
MCV RBC AUTO: 93 FL (ref 80–100)
NRBC BLD-RTO: 0 /100 WBCS (ref 0–0)
PLATELET # BLD AUTO: 217 X10*3/UL (ref 150–450)
POTASSIUM SERPL-SCNC: 4 MMOL/L (ref 3.5–5.3)
RBC # BLD AUTO: 4.07 X10*6/UL (ref 4–5.2)
SODIUM SERPL-SCNC: 142 MMOL/L (ref 136–145)
WBC # BLD AUTO: 6.5 X10*3/UL (ref 4.4–11.3)

## 2024-02-15 PROCEDURE — 80048 BASIC METABOLIC PNL TOTAL CA: CPT

## 2024-02-15 PROCEDURE — 85027 COMPLETE CBC AUTOMATED: CPT

## 2024-02-16 ENCOUNTER — NURSING HOME VISIT (OUTPATIENT)
Dept: POST ACUTE CARE | Facility: EXTERNAL LOCATION | Age: 74
End: 2024-02-16
Payer: COMMERCIAL

## 2024-02-16 VITALS
HEART RATE: 72 BPM | OXYGEN SATURATION: 95 % | SYSTOLIC BLOOD PRESSURE: 103 MMHG | WEIGHT: 94 LBS | BODY MASS INDEX: 15.17 KG/M2 | DIASTOLIC BLOOD PRESSURE: 61 MMHG | RESPIRATION RATE: 18 BRPM | TEMPERATURE: 98.3 F

## 2024-02-16 DIAGNOSIS — R64 CACHEXIA (MULTI): Primary | ICD-10-CM

## 2024-02-16 DIAGNOSIS — F03.90 DEMENTIA WITHOUT BEHAVIORAL DISTURBANCE (MULTI): ICD-10-CM

## 2024-02-16 DIAGNOSIS — R53.81 DEBILITY: ICD-10-CM

## 2024-02-16 DIAGNOSIS — G20.B2 PARKINSON'S DISEASE WITH DYSKINESIA AND FLUCTUATING MANIFESTATIONS (MULTI): ICD-10-CM

## 2024-02-16 PROCEDURE — 99308 SBSQ NF CARE LOW MDM 20: CPT | Performed by: INTERNAL MEDICINE

## 2024-02-16 NOTE — LETTER
Patient: Snehal Souza  : 1950    Encounter Date: 2024    Subjective  Patient ID: Snehal Souza is a 73 y.o. female who is long term resident being seen and evaluated for multiple medical problems.    HPI   This 73-year-old female patient is sitting up in the common area in no distress.  She has no complaints for me at this time.  She is quiet and nursing has no adverse events reported to me.    Current high risk medication:  Sinemet  Namenda    Current laboratory examination from 2023:  Potassium 3.9  Bicarbonate 26  Creatinine 0.5  Albumin 4.2  Liver injury test normal  CBC normal  Laboratory examination from May 2023:  TSH 1.38    Review of Systems   Constitutional:         Difficult to obtain due to dementia, denies any complaints when asked.        Objective  /61   Pulse 72   Temp 36.8 °C (98.3 °F)   Resp 18   Wt (!) 42.6 kg (94 lb)   SpO2 95%   BMI 15.17 kg/m²     Physical Exam  Constitutional:       General: She is not in acute distress.     Comments: Frail, cachectic elderly female sitting up in chair, no apparent distress   HENT:      Head: Normocephalic and atraumatic.   Eyes:      Conjunctiva/sclera: Conjunctivae normal.   Cardiovascular:      Rate and Rhythm: Normal rate and regular rhythm.   Pulmonary:      Effort: Pulmonary effort is normal. No respiratory distress.      Breath sounds: Normal breath sounds.   Abdominal:      General: Bowel sounds are normal. There is no distension.      Palpations: Abdomen is soft.      Tenderness: There is no abdominal tenderness.   Musculoskeletal:      Right lower leg: No edema.      Left lower leg: No edema.      Comments: diffusely decreased muscle mass     Skin:     General: Skin is warm and dry.   Neurological:      Mental Status: She is alert.      Comments: Oriented times 1   Psychiatric:         Mood and Affect: Mood normal.         Behavior: Behavior normal.         Assessment/Plan  Problem List Items Addressed This Visit              ICD-10-CM    Dementia without behavioral disturbance (CMS/HCC) F03.90    Cachexia (CMS/HCC) - Primary R64    Debility R53.81    Parkinson disease G20.A1     A.  We will continue with restorative and supportive care as the patient tolerates    B.  Laboratory examinations will continue to be monitored on an ongoing as-needed basis neck should be due in June 2024 or as needed    C.  The patient's prognosis is guarded.        Electronically Signed By: Luis Fernando Zelaya MD   2/19/24  4:00 PM

## 2024-02-17 NOTE — PROGRESS NOTES
Subjective   Patient ID: Snehal Souza is a 73 y.o. female who is long term resident being seen and evaluated for multiple medical problems.    HPI   This 73-year-old female patient is sitting up in the common area in no distress.  She has no complaints for me at this time.  She is quiet and nursing has no adverse events reported to me.    Current high risk medication:  Sinemet  Namenda    Current laboratory examination from December 2023:  Potassium 3.9  Bicarbonate 26  Creatinine 0.5  Albumin 4.2  Liver injury test normal  CBC normal  Laboratory examination from May 2023:  TSH 1.38    Review of Systems   Constitutional:         Difficult to obtain due to dementia, denies any complaints when asked.        Objective   /61   Pulse 72   Temp 36.8 °C (98.3 °F)   Resp 18   Wt (!) 42.6 kg (94 lb)   SpO2 95%   BMI 15.17 kg/m²     Physical Exam  Constitutional:       General: She is not in acute distress.     Comments: Frail, cachectic elderly female sitting up in chair, no apparent distress   HENT:      Head: Normocephalic and atraumatic.   Eyes:      Conjunctiva/sclera: Conjunctivae normal.   Cardiovascular:      Rate and Rhythm: Normal rate and regular rhythm.   Pulmonary:      Effort: Pulmonary effort is normal. No respiratory distress.      Breath sounds: Normal breath sounds.   Abdominal:      General: Bowel sounds are normal. There is no distension.      Palpations: Abdomen is soft.      Tenderness: There is no abdominal tenderness.   Musculoskeletal:      Right lower leg: No edema.      Left lower leg: No edema.      Comments: diffusely decreased muscle mass     Skin:     General: Skin is warm and dry.   Neurological:      Mental Status: She is alert.      Comments: Oriented times 1   Psychiatric:         Mood and Affect: Mood normal.         Behavior: Behavior normal.         Assessment/Plan   Problem List Items Addressed This Visit             ICD-10-CM    Dementia without behavioral disturbance  (CMS/HCC) F03.90    Cachexia (CMS/HCC) - Primary R64    Debility R53.81    Parkinson disease G20.A1     A.  We will continue with restorative and supportive care as the patient tolerates    B.  Laboratory examinations will continue to be monitored on an ongoing as-needed basis neck should be due in June 2024 or as needed    C.  The patient's prognosis is guarded.

## 2024-02-21 ENCOUNTER — NURSING HOME VISIT (OUTPATIENT)
Dept: POST ACUTE CARE | Facility: EXTERNAL LOCATION | Age: 74
End: 2024-02-21
Payer: COMMERCIAL

## 2024-02-21 DIAGNOSIS — F41.9 ANXIETY DISORDER, UNSPECIFIED TYPE: Primary | ICD-10-CM

## 2024-02-21 DIAGNOSIS — G20.B2 PARKINSON'S DISEASE WITH DYSKINESIA AND FLUCTUATING MANIFESTATIONS (MULTI): ICD-10-CM

## 2024-02-21 DIAGNOSIS — E55.9 VITAMIN D DEFICIENCY: ICD-10-CM

## 2024-02-21 DIAGNOSIS — R64 CACHEXIA (MULTI): ICD-10-CM

## 2024-02-21 DIAGNOSIS — F03.90 DEMENTIA WITHOUT BEHAVIORAL DISTURBANCE (MULTI): ICD-10-CM

## 2024-02-21 PROCEDURE — 99309 SBSQ NF CARE MODERATE MDM 30: CPT | Performed by: NURSE PRACTITIONER

## 2024-02-21 NOTE — PROGRESS NOTES
Subjective   Snehal Souza is a 73 y.o. female Here for routine monthly visit.    HPI There are no new problems and multiple health problems have been reviewed.  The course has been unchanged.  The patient  is moderately to severely  confused.   There are no family members present during time of visit.    she  is sitting up in chair,  denies any complaints when asked.  Eating and drinking well.   No concerns per staff.   She had stat labs last week due ot decreased urination which has resolved, she is voiding normally per staff.  Labs were acceptable.       Review of Systems   Constitutional:         Difficult to obtain due to dementia, denies any complaints when asked.        Objective   /61   Pulse 68   Temp 36.8 °C (98.3 °F)   Resp 18   Wt (!) 42.6 kg (94 lb)   SpO2 97%   BMI 15.17 kg/m²     Physical Exam  Constitutional:       General: She is not in acute distress.     Comments: Frail, cachectic elderly female sitting up in chair, no apparent distress   HENT:      Head: Normocephalic and atraumatic.   Eyes:      Conjunctiva/sclera: Conjunctivae normal.   Cardiovascular:      Rate and Rhythm: Normal rate and regular rhythm.   Pulmonary:      Effort: Pulmonary effort is normal. No respiratory distress.      Breath sounds: Normal breath sounds.   Abdominal:      General: Bowel sounds are normal. There is no distension.      Palpations: Abdomen is soft.      Tenderness: There is no abdominal tenderness.   Musculoskeletal:      Right lower leg: No edema.      Left lower leg: No edema.      Comments: diffusely decreased muscle mass     Skin:     General: Skin is warm and dry.   Neurological:      Mental Status: She is alert.      Comments: Oriented times 1   Psychiatric:         Mood and Affect: Mood normal.         Behavior: Behavior normal.         Assessment/Plan   Problem List Items Addressed This Visit       Dementia without behavioral disturbance (CMS/HCC)     Oriented times 1, no acute changes.    staff to monitor and notify for any changes.           Cachexia (CMS/HCC)     Thin, frail.  Monitor weights and oral intake.          Anxiety disorder, unspecified - Primary     Appears controlled at present, not on meds.  staff to monitor and notify for any changes.           Parkinson disease     On sinemet.  continue with current medical management           Vitamin D deficiency     On supplement, monitor with routine labs        labs/meds/orders reviewed  staff to monitor and notify for any changes.  continue with supportive and restorative care  Next labs 6/24 and prn

## 2024-02-21 NOTE — LETTER
Patient: Snehal Souza  : 1950    Encounter Date: 2024    Subjective  Snehal Souza is a 73 y.o. female Here for routine monthly visit.    HPI There are no new problems and multiple health problems have been reviewed.  The course has been unchanged.  The patient  is moderately to severely  confused.   There are no family members present during time of visit.    she  is sitting up in chair,  denies any complaints when asked.  Eating and drinking well.   No concerns per staff.   She had stat labs last week due ot decreased urination which has resolved, she is voiding normally per staff.  Labs were acceptable.       Review of Systems   Constitutional:         Difficult to obtain due to dementia, denies any complaints when asked.        Objective  /61   Pulse 68   Temp 36.8 °C (98.3 °F)   Resp 18   Wt (!) 42.6 kg (94 lb)   SpO2 97%   BMI 15.17 kg/m²     Physical Exam  Constitutional:       General: She is not in acute distress.     Comments: Frail, cachectic elderly female sitting up in chair, no apparent distress   HENT:      Head: Normocephalic and atraumatic.   Eyes:      Conjunctiva/sclera: Conjunctivae normal.   Cardiovascular:      Rate and Rhythm: Normal rate and regular rhythm.   Pulmonary:      Effort: Pulmonary effort is normal. No respiratory distress.      Breath sounds: Normal breath sounds.   Abdominal:      General: Bowel sounds are normal. There is no distension.      Palpations: Abdomen is soft.      Tenderness: There is no abdominal tenderness.   Musculoskeletal:      Right lower leg: No edema.      Left lower leg: No edema.      Comments: diffusely decreased muscle mass     Skin:     General: Skin is warm and dry.   Neurological:      Mental Status: She is alert.      Comments: Oriented times 1   Psychiatric:         Mood and Affect: Mood normal.         Behavior: Behavior normal.         Assessment/Plan  Problem List Items Addressed This Visit       Dementia without behavioral  disturbance (CMS/HCC)     Oriented times 1, no acute changes.   staff to monitor and notify for any changes.           Cachexia (CMS/HCC)     Thin, frail.  Monitor weights and oral intake.          Anxiety disorder, unspecified - Primary     Appears controlled at present, not on meds.  staff to monitor and notify for any changes.           Parkinson disease     On sinemet.  continue with current medical management           Vitamin D deficiency     On supplement, monitor with routine labs        labs/meds/orders reviewed  staff to monitor and notify for any changes.  continue with supportive and restorative care  Next labs 6/24 and prn      Electronically Signed By: RADHA Cabello-CNP   2/24/24 10:52 AM

## 2024-02-24 VITALS
OXYGEN SATURATION: 97 % | DIASTOLIC BLOOD PRESSURE: 61 MMHG | HEART RATE: 68 BPM | SYSTOLIC BLOOD PRESSURE: 103 MMHG | RESPIRATION RATE: 18 BRPM | WEIGHT: 94 LBS | BODY MASS INDEX: 15.17 KG/M2 | TEMPERATURE: 98.3 F

## 2024-02-24 NOTE — ASSESSMENT & PLAN NOTE
>>ASSESSMENT AND PLAN FOR PARKINSON DISEASE (MULTI) WRITTEN ON 2/24/2024 10:51 AM BY AASHISH PORTER    On sinemet.  continue with current medical management

## 2024-03-14 ENCOUNTER — NURSING HOME VISIT (OUTPATIENT)
Dept: POST ACUTE CARE | Facility: EXTERNAL LOCATION | Age: 74
End: 2024-03-14
Payer: COMMERCIAL

## 2024-03-14 VITALS
DIASTOLIC BLOOD PRESSURE: 72 MMHG | BODY MASS INDEX: 15.82 KG/M2 | OXYGEN SATURATION: 97 % | RESPIRATION RATE: 18 BRPM | SYSTOLIC BLOOD PRESSURE: 106 MMHG | HEART RATE: 80 BPM | TEMPERATURE: 97.5 F | WEIGHT: 98 LBS

## 2024-03-14 DIAGNOSIS — C50.919 MALIGNANT NEOPLASM OF FEMALE BREAST, UNSPECIFIED ESTROGEN RECEPTOR STATUS, UNSPECIFIED LATERALITY, UNSPECIFIED SITE OF BREAST (MULTI): ICD-10-CM

## 2024-03-14 DIAGNOSIS — G40.909 NONINTRACTABLE EPILEPSY WITHOUT STATUS EPILEPTICUS, UNSPECIFIED EPILEPSY TYPE (MULTI): ICD-10-CM

## 2024-03-14 DIAGNOSIS — F33.40 RECURRENT MAJOR DEPRESSIVE DISORDER, IN REMISSION (CMS-HCC): ICD-10-CM

## 2024-03-14 DIAGNOSIS — G20.A1 PARKINSON'S DISEASE WITHOUT DYSKINESIA OR FLUCTUATING MANIFESTATIONS (MULTI): Primary | ICD-10-CM

## 2024-03-14 DIAGNOSIS — R53.81 DEBILITY: ICD-10-CM

## 2024-03-14 DIAGNOSIS — F03.90 DEMENTIA WITHOUT BEHAVIORAL DISTURBANCE (MULTI): ICD-10-CM

## 2024-03-14 DIAGNOSIS — R64 CACHEXIA (MULTI): ICD-10-CM

## 2024-03-14 PROCEDURE — 99308 SBSQ NF CARE LOW MDM 20: CPT | Performed by: INTERNAL MEDICINE

## 2024-03-14 NOTE — LETTER
Patient: Snehal Souza  : 1950    Encounter Date: 2024    Subjective  Patient ID: Snehal Souza is a 73 y.o. female who is long term resident being seen and evaluated for multiple medical problems.    HPI   This 73-year-old female patient is resting comfortably in her bed in no distress.  She has no outward signs of distress or discomfort.  Nursing has no new adverse events reported to me at this time.    Current high risk medication:  Sinemet  Namenda    Laboratory examination from February 15, 2024:  BMP and CBC normal except for glucose 130  TSH and B12 are normal  Laboratory examination from May 2023:  Vitamin D 23    Review of Systems   Constitutional:         Difficult to obtain due to dementia, denies any complaints when asked.        Objective  /72   Pulse 80   Temp 36.4 °C (97.5 °F)   Resp 18   Wt (!) 44.5 kg (98 lb)   SpO2 97%   BMI 15.82 kg/m²     Physical Exam  Constitutional:       General: She is not in acute distress.     Comments: Frail, cachectic elderly female sitting up in chair, no apparent distress   HENT:      Head: Normocephalic and atraumatic.   Eyes:      Conjunctiva/sclera: Conjunctivae normal.   Cardiovascular:      Rate and Rhythm: Normal rate and regular rhythm.   Pulmonary:      Effort: Pulmonary effort is normal. No respiratory distress.      Breath sounds: Normal breath sounds.   Abdominal:      General: Bowel sounds are normal. There is no distension.      Palpations: Abdomen is soft.      Tenderness: There is no abdominal tenderness.   Musculoskeletal:      Right lower leg: No edema.      Left lower leg: No edema.      Comments: diffusely decreased muscle mass     Skin:     General: Skin is warm and dry.   Neurological:      Mental Status: She is alert.      Comments: Oriented times 1   Psychiatric:         Mood and Affect: Mood normal.         Behavior: Behavior normal.         Assessment/Plan  Problem List Items Addressed This Visit             ICD-10-CM     Dementia without behavioral disturbance (CMS/HCC) F03.90    Cachexia (CMS/[facility]) R64    Debility R53.81    Parkinson's disease without dyskinesia or fluctuating manifestations - Primary G20.A1     Stable on current treatment         Recurrent major depressive disorder, in remission (CMS/[facility]) F33.40     Difficult to assess but appears to be in remission         Nonintractable epilepsy without status epilepticus, unspecified epilepsy type (CMS/[facility]) G40.909     No evidence of seizures reported         Malignant neoplasm of female breast, unspecified estrogen receptor status, unspecified laterality, unspecified site of breast (CMS/[facility]) C50.919     In remission currently          8.  We will continue with restorative and supportive care as the patient tolerates    B.  Laboratory examinations will next be due in August 2024 as needed    C.  The patient's prognosis is guarded.        Electronically Signed By: Luis Fernando Zelaya MD   3/19/24 10:41 AM

## 2024-03-15 NOTE — PROGRESS NOTES
Subjective   Patient ID: Snehal Souza is a 73 y.o. female who is long term resident being seen and evaluated for multiple medical problems.    HPI   This 73-year-old female patient is resting comfortably in her bed in no distress.  She has no outward signs of distress or discomfort.  Nursing has no new adverse events reported to me at this time.    Current high risk medication:  Sinemet  Namenda    Laboratory examination from February 15, 2024:  BMP and CBC normal except for glucose 130  TSH and B12 are normal  Laboratory examination from May 2023:  Vitamin D 23    Review of Systems   Constitutional:         Difficult to obtain due to dementia, denies any complaints when asked.        Objective   /72   Pulse 80   Temp 36.4 °C (97.5 °F)   Resp 18   Wt (!) 44.5 kg (98 lb)   SpO2 97%   BMI 15.82 kg/m²     Physical Exam  Constitutional:       General: She is not in acute distress.     Comments: Frail, cachectic elderly female sitting up in chair, no apparent distress   HENT:      Head: Normocephalic and atraumatic.   Eyes:      Conjunctiva/sclera: Conjunctivae normal.   Cardiovascular:      Rate and Rhythm: Normal rate and regular rhythm.   Pulmonary:      Effort: Pulmonary effort is normal. No respiratory distress.      Breath sounds: Normal breath sounds.   Abdominal:      General: Bowel sounds are normal. There is no distension.      Palpations: Abdomen is soft.      Tenderness: There is no abdominal tenderness.   Musculoskeletal:      Right lower leg: No edema.      Left lower leg: No edema.      Comments: diffusely decreased muscle mass     Skin:     General: Skin is warm and dry.   Neurological:      Mental Status: She is alert.      Comments: Oriented times 1   Psychiatric:         Mood and Affect: Mood normal.         Behavior: Behavior normal.         Assessment/Plan   Problem List Items Addressed This Visit             ICD-10-CM    Dementia without behavioral disturbance (CMS/HCC) F03.90     Cachexia (CMS/[facility]) R64    Debility R53.81    Parkinson's disease without dyskinesia or fluctuating manifestations - Primary G20.A1     Stable on current treatment         Recurrent major depressive disorder, in remission (CMS/[facility]) F33.40     Difficult to assess but appears to be in remission         Nonintractable epilepsy without status epilepticus, unspecified epilepsy type (CMS/[facility]) G40.909     No evidence of seizures reported         Malignant neoplasm of female breast, unspecified estrogen receptor status, unspecified laterality, unspecified site of breast (CMS/[facility]) C50.919     In remission currently          8.  We will continue with restorative and supportive care as the patient tolerates    B.  Laboratory examinations will next be due in August 2024 as needed    C.  The patient's prognosis is guarded.

## 2024-03-19 PROBLEM — G20.A1 PARKINSON'S DISEASE WITHOUT DYSKINESIA OR FLUCTUATING MANIFESTATIONS (MULTI): Status: ACTIVE | Noted: 2024-03-19

## 2024-03-19 PROBLEM — F33.40 RECURRENT MAJOR DEPRESSIVE DISORDER, IN REMISSION (CMS-HCC): Status: ACTIVE | Noted: 2024-03-19

## 2024-03-19 PROBLEM — G40.909 NONINTRACTABLE EPILEPSY WITHOUT STATUS EPILEPTICUS, UNSPECIFIED EPILEPSY TYPE (MULTI): Status: ACTIVE | Noted: 2024-03-19

## 2024-03-19 PROBLEM — C50.919 MALIGNANT NEOPLASM OF FEMALE BREAST, UNSPECIFIED ESTROGEN RECEPTOR STATUS, UNSPECIFIED LATERALITY, UNSPECIFIED SITE OF BREAST (MULTI): Status: ACTIVE | Noted: 2024-03-19

## 2024-04-10 ENCOUNTER — NURSING HOME VISIT (OUTPATIENT)
Dept: POST ACUTE CARE | Facility: EXTERNAL LOCATION | Age: 74
End: 2024-04-10
Payer: COMMERCIAL

## 2024-04-10 VITALS
BODY MASS INDEX: 15.66 KG/M2 | SYSTOLIC BLOOD PRESSURE: 138 MMHG | RESPIRATION RATE: 18 BRPM | TEMPERATURE: 97.6 F | OXYGEN SATURATION: 97 % | HEART RATE: 78 BPM | WEIGHT: 97 LBS | DIASTOLIC BLOOD PRESSURE: 62 MMHG

## 2024-04-10 DIAGNOSIS — R53.81 DEBILITY: ICD-10-CM

## 2024-04-10 DIAGNOSIS — R64 CACHEXIA (MULTI): ICD-10-CM

## 2024-04-10 DIAGNOSIS — G20.B2 PARKINSON'S DISEASE WITH DYSKINESIA AND FLUCTUATING MANIFESTATIONS (MULTI): ICD-10-CM

## 2024-04-10 DIAGNOSIS — F41.9 ANXIETY DISORDER, UNSPECIFIED TYPE: ICD-10-CM

## 2024-04-10 DIAGNOSIS — F03.90 DEMENTIA WITHOUT BEHAVIORAL DISTURBANCE (MULTI): Primary | ICD-10-CM

## 2024-04-10 DIAGNOSIS — E55.9 VITAMIN D DEFICIENCY: ICD-10-CM

## 2024-04-10 PROCEDURE — 99310 SBSQ NF CARE HIGH MDM 45: CPT | Performed by: NURSE PRACTITIONER

## 2024-04-10 RX ORDER — DIAZEPAM 5 MG/1
TABLET ORAL
Qty: 90 TABLET | Refills: 0 | Status: SHIPPED | OUTPATIENT
Start: 2024-04-10

## 2024-04-10 NOTE — ASSESSMENT & PLAN NOTE
Snehal will benefit from Right Wrist-Hand orthotic for safety and independence with mobility and decreased risk of contractures for use in home and community environments for safe and independent completion of homegoing tasks with LRAD, ie ADL's, mobility, transfers with decreased risk for falls  Diagnosis is Parkinson's and will need splint/orthotic for lifetime use.    Declines

## 2024-04-10 NOTE — ASSESSMENT & PLAN NOTE
>>ASSESSMENT AND PLAN FOR PARKINSON DISEASE (MULTI) WRITTEN ON 4/10/2024 12:14 PM BY RADHA PORTER-CNP    Snehal will benefit from Right Wrist-Hand orthotic for safety and independence with mobility and decreased risk of contractures for use in home and community environments for safe and independent completion of homegoing tasks with LRAD, ie ADL's, mobility, transfers with decreased risk for falls  Diagnosis is Parkinson's and will need splint/orthotic for lifetime use.

## 2024-04-10 NOTE — LETTER
Patient: Snehal Souza  : 1950    Encounter Date: 04/10/2024    Subjective  Snehal Souza is a 73 y.o. female Here for orthotic evaluation and increased rigidity.   HPI There are no new problems.  Staff has noted increased rigidity especially noted with care and therapy.    The patient  is moderately to severely  confused.   There are no family members present during time of visit.    she  is sitting up in chair,  denies any complaints when asked.  Eating and drinking well.   No concerns per staff.   She is also being evaluated for right WHO.        Review of Systems   Constitutional:         Difficult to obtain due to dementia, denies any complaints when asked.        Objective  /62   Pulse 78   Temp 36.4 °C (97.6 °F)   Resp 18   Wt (!) 44 kg (97 lb)   SpO2 97%   BMI 15.66 kg/m²     Physical Exam  Constitutional:       General: She is not in acute distress.     Comments: Frail, cachectic elderly female sitting up in chair, no apparent distress   HENT:      Head: Normocephalic and atraumatic.   Eyes:      Conjunctiva/sclera: Conjunctivae normal.   Cardiovascular:      Rate and Rhythm: Normal rate and regular rhythm.   Pulmonary:      Effort: Pulmonary effort is normal. No respiratory distress.      Breath sounds: Normal breath sounds.   Abdominal:      General: Bowel sounds are normal. There is no distension.      Palpations: Abdomen is soft.      Tenderness: There is no abdominal tenderness.   Musculoskeletal:      Right lower leg: No edema.      Left lower leg: No edema.      Comments: diffusely decreased muscle mass     Skin:     General: Skin is warm and dry.   Neurological:      Mental Status: She is alert.      Comments: Oriented times 1   Psychiatric:         Mood and Affect: Mood normal.         Behavior: Behavior normal.         Assessment/Plan  Problem List Items Addressed This Visit       Dementia without behavioral disturbance (CMS/HCC) - Primary     Oriented times 1, no acute changes.    staff to monitor and notify for any changes.           Cachexia (CMS/HCC)     Thin, frail.  Monitor weights and oral intake.          Anxiety disorder, unspecified     She is becoming more rigid and stiff, noted especially with therapy and care.  Case discussed with Dr Zelaya, will trial low dose diazepam every 12 hours to see if this improves sx without excessive sedation.             Debility     Requires assistance with ADL's.           Parkinson disease (CMS/HCC)     Snehal will benefit from Right Wrist-Hand orthotic for safety and independence with mobility and decreased risk of contractures for use in home and community environments for safe and independent completion of homegoing tasks with LRAD, ie ADL's, mobility, transfers with decreased risk for falls  Diagnosis is Parkinson's and will need splint/orthotic for lifetime use.          Vitamin D deficiency     On supplement, monitor with routine labs        labs/meds/orders reviewed  staff to monitor and notify for any changes.  continue with supportive and restorative care  Next labs 6/24 and prn  Start low dose diazepam and monitor for effectiveness.   Case discussed with Dr Zelaya.   Time for coordination of care was greater than 35 minutes [facility] chart review, visit and exam, discussion with nursing and therapy staff.       Electronically Signed By: AASHISH Cabello   4/10/24 12:21 PM

## 2024-04-10 NOTE — ASSESSMENT & PLAN NOTE
She is becoming more rigid and stiff, noted especially with therapy and care.  Case discussed with Dr Zelaya, will trial low dose diazepam every 12 hours to see if this improves sx without excessive sedation.

## 2024-04-10 NOTE — PROGRESS NOTES
Subjective   Snehal Souza is a 73 y.o. female Here for orthotic evaluation and increased rigidity.   HPI There are no new problems.  Staff has noted increased rigidity especially noted with care and therapy.    The patient  is moderately to severely  confused.   There are no family members present during time of visit.    she  is sitting up in chair,  denies any complaints when asked.  Eating and drinking well.   No concerns per staff.   She is also being evaluated for right WHO.        Review of Systems   Constitutional:         Difficult to obtain due to dementia, denies any complaints when asked.        Objective   /62   Pulse 78   Temp 36.4 °C (97.6 °F)   Resp 18   Wt (!) 44 kg (97 lb)   SpO2 97%   BMI 15.66 kg/m²     Physical Exam  Constitutional:       General: She is not in acute distress.     Comments: Frail, cachectic elderly female sitting up in chair, no apparent distress   HENT:      Head: Normocephalic and atraumatic.   Eyes:      Conjunctiva/sclera: Conjunctivae normal.   Cardiovascular:      Rate and Rhythm: Normal rate and regular rhythm.   Pulmonary:      Effort: Pulmonary effort is normal. No respiratory distress.      Breath sounds: Normal breath sounds.   Abdominal:      General: Bowel sounds are normal. There is no distension.      Palpations: Abdomen is soft.      Tenderness: There is no abdominal tenderness.   Musculoskeletal:      Right lower leg: No edema.      Left lower leg: No edema.      Comments: diffusely decreased muscle mass     Skin:     General: Skin is warm and dry.   Neurological:      Mental Status: She is alert.      Comments: Oriented times 1   Psychiatric:         Mood and Affect: Mood normal.         Behavior: Behavior normal.         Assessment/Plan   Problem List Items Addressed This Visit       Dementia without behavioral disturbance (CMS/HCC) - Primary     Oriented times 1, no acute changes.   staff to monitor and notify for any changes.           Cachexia  (CMS/[facility])     Thin, frail.  Monitor weights and oral intake.          Anxiety disorder, unspecified     She is becoming more rigid and stiff, noted especially with therapy and care.  Case discussed with Dr Zelaya, will trial low dose diazepam every 12 hours to see if this improves sx without excessive sedation.             Debility     Requires assistance with ADL's.           Parkinson disease (CMS/[facility])     Snehal will benefit from Right Wrist-Hand orthotic for safety and independence with mobility and decreased risk of contractures for use in home and community environments for safe and independent completion of homegoing tasks with LRAD, ie ADL's, mobility, transfers with decreased risk for falls  Diagnosis is Parkinson's and will need splint/orthotic for lifetime use.          Vitamin D deficiency     On supplement, monitor with routine labs        labs/meds/orders reviewed  staff to monitor and notify for any changes.  continue with supportive and restorative care  Next labs 6/24 and prn  Start low dose diazepam and monitor for effectiveness.   Case discussed with Dr Zelaya.   Time for coordination of care was greater than 35 minutes [facility] chart review, visit and exam, discussion with nursing and therapy staff.

## 2024-04-12 ENCOUNTER — NURSING HOME VISIT (OUTPATIENT)
Dept: POST ACUTE CARE | Facility: EXTERNAL LOCATION | Age: 74
End: 2024-04-12
Payer: COMMERCIAL

## 2024-04-12 VITALS
WEIGHT: 97 LBS | DIASTOLIC BLOOD PRESSURE: 62 MMHG | BODY MASS INDEX: 15.66 KG/M2 | HEART RATE: 78 BPM | SYSTOLIC BLOOD PRESSURE: 138 MMHG | OXYGEN SATURATION: 97 % | TEMPERATURE: 97.6 F | RESPIRATION RATE: 18 BRPM

## 2024-04-12 DIAGNOSIS — M79.10 MUSCLE TENSION PAIN: Primary | ICD-10-CM

## 2024-04-12 DIAGNOSIS — F03.90 DEMENTIA WITHOUT BEHAVIORAL DISTURBANCE (MULTI): ICD-10-CM

## 2024-04-12 DIAGNOSIS — R64 CACHEXIA (MULTI): ICD-10-CM

## 2024-04-12 DIAGNOSIS — G20.A1 PARKINSON'S DISEASE WITHOUT DYSKINESIA OR FLUCTUATING MANIFESTATIONS (MULTI): ICD-10-CM

## 2024-04-12 DIAGNOSIS — R53.81 DEBILITY: ICD-10-CM

## 2024-04-12 DIAGNOSIS — F41.9 ANXIETY: ICD-10-CM

## 2024-04-12 PROCEDURE — 99309 SBSQ NF CARE MODERATE MDM 30: CPT | Performed by: INTERNAL MEDICINE

## 2024-04-12 NOTE — LETTER
Patient: Snehal Souza  : 1950    Encounter Date: 2024    Subjective  Patient ID: Snehal Souza is a 73 y.o. female who is long term resident being seen and evaluated for multiple medical problems.    HPI   Was called to see the patient regarding stiffness which was inhibiting the patient's participation in restorative care and potentially also hands-on care by the staff.  The initial question was whether or not this was a manifestation of Parkinson's.  The patient is nonverbal sitting comfortably in her chair watching television in no distress whatsoever.  She is seen moving her arms about normally however when I examine her her arms tense noticeably.    Current medications:  Dulcolax  Colace  Sinemet  Tylenol  Namenda  Milk of magnesia  Vitamin D  Flonase  Diazepam low-dose which was started few days ago    Laboratory examination from 2024:  Potassium 4.0  Bicarbonate 28  Creatinine 0.55  Hemoglobin 12.1  Laboratory examination from 2023:  Albumin 4.2  Liver injury test normal  Vitamin B12 and TSH were normal in 2023    Review of Systems   Constitutional:         Difficult to obtain due to dementia, denies any complaints when asked.        Objective  /62   Pulse 78   Temp 36.4 °C (97.6 °F)   Resp 18   Wt (!) 44 kg (97 lb)   SpO2 97%   BMI 15.66 kg/m²     Physical Exam  Constitutional:       General: She is not in acute distress.     Comments: Frail, cachectic elderly female sitting up in chair, no apparent distress   HENT:      Head: Normocephalic and atraumatic.   Eyes:      Conjunctiva/sclera: Conjunctivae normal.   Cardiovascular:      Rate and Rhythm: Normal rate and regular rhythm.   Pulmonary:      Effort: Pulmonary effort is normal. No respiratory distress.      Breath sounds: Normal breath sounds.   Abdominal:      General: Bowel sounds are normal. There is no distension.      Palpations: Abdomen is soft.      Tenderness: There is no abdominal tenderness.    Musculoskeletal:      Right lower leg: No edema.      Left lower leg: No edema.      Comments: diffusely decreased muscle mass     Skin:     General: Skin is warm and dry.   Neurological:      Mental Status: She is alert.      Comments: Oriented times 1.  The patient becomes stiff when being examined but otherwise is seen moving her arms somewhat normally.  There is no tremor appreciated.  There is no dyskinesia appreciated   Psychiatric:         Mood and Affect: Mood normal.         Behavior: Behavior normal.         Assessment/Plan  Problem List Items Addressed This Visit             ICD-10-CM    Dementia without behavioral disturbance (Multi) F03.90    Cachexia (Multi) R64    Debility R53.81    Parkinson disease (Multi) G20.A1    Muscle tension pain - Primary M79.10    Anxiety F41.9     A.  We will continue with restorative and supportive care as the patient tolerates    B.  Is felt that this patient's clinical syndrome is most consistent with dementia mediated anxiety creating muscle tension during hands-on care and restorative rehabilitative therapy.    C.  Will administer very low doses of long-acting benzodiazepine in hopes that this can help relieve her of some of the anxiety she experiences during hands-on care.    D.  Laboratory examinations will be next due in August 2024 as needed    8.  The patient's prognosis is guarded.        Electronically Signed By: Luis Fernando Zelaya MD   4/15/24  3:10 PM

## 2024-04-12 NOTE — PROGRESS NOTES
Subjective   Patient ID: Snehal Souza is a 73 y.o. female who is long term resident being seen and evaluated for multiple medical problems.    HPI   Was called to see the patient regarding stiffness which was inhibiting the patient's participation in restorative care and potentially also hands-on care by the staff.  The initial question was whether or not this was a manifestation of Parkinson's.  The patient is nonverbal sitting comfortably in her chair watching television in no distress whatsoever.  She is seen moving her arms about normally however when I examine her her arms tense noticeably.    Current medications:  Dulcolax  Colace  Sinemet  Tylenol  Namenda  Milk of magnesia  Vitamin D  Flonase  Diazepam low-dose which was started few days ago    Laboratory examination from February 2024:  Potassium 4.0  Bicarbonate 28  Creatinine 0.55  Hemoglobin 12.1  Laboratory examination from December 2023:  Albumin 4.2  Liver injury test normal  Vitamin B12 and TSH were normal in June 2023    Review of Systems   Constitutional:         Difficult to obtain due to dementia, denies any complaints when asked.        Objective   /62   Pulse 78   Temp 36.4 °C (97.6 °F)   Resp 18   Wt (!) 44 kg (97 lb)   SpO2 97%   BMI 15.66 kg/m²     Physical Exam  Constitutional:       General: She is not in acute distress.     Comments: Frail, cachectic elderly female sitting up in chair, no apparent distress   HENT:      Head: Normocephalic and atraumatic.   Eyes:      Conjunctiva/sclera: Conjunctivae normal.   Cardiovascular:      Rate and Rhythm: Normal rate and regular rhythm.   Pulmonary:      Effort: Pulmonary effort is normal. No respiratory distress.      Breath sounds: Normal breath sounds.   Abdominal:      General: Bowel sounds are normal. There is no distension.      Palpations: Abdomen is soft.      Tenderness: There is no abdominal tenderness.   Musculoskeletal:      Right lower leg: No edema.      Left lower leg:  No edema.      Comments: diffusely decreased muscle mass     Skin:     General: Skin is warm and dry.   Neurological:      Mental Status: She is alert.      Comments: Oriented times 1.  The patient becomes stiff when being examined but otherwise is seen moving her arms somewhat normally.  There is no tremor appreciated.  There is no dyskinesia appreciated   Psychiatric:         Mood and Affect: Mood normal.         Behavior: Behavior normal.         Assessment/Plan   Problem List Items Addressed This Visit             ICD-10-CM    Dementia without behavioral disturbance (Multi) F03.90    Cachexia (Multi) R64    Debility R53.81    Parkinson disease (Multi) G20.A1    Muscle tension pain - Primary M79.10    Anxiety F41.9     A.  We will continue with restorative and supportive care as the patient tolerates    B.  Is felt that this patient's clinical syndrome is most consistent with dementia mediated anxiety creating muscle tension during hands-on care and restorative rehabilitative therapy.    C.  Will administer very low doses of long-acting benzodiazepine in hopes that this can help relieve her of some of the anxiety she experiences during hands-on care.    D.  Laboratory examinations will be next due in August 2024 as needed    8.  The patient's prognosis is guarded.

## 2024-04-15 PROBLEM — M79.10 MUSCLE TENSION PAIN: Status: ACTIVE | Noted: 2024-04-15

## 2024-04-15 PROBLEM — F41.9 ANXIETY: Status: ACTIVE | Noted: 2024-04-15

## 2024-04-24 ENCOUNTER — NURSING HOME VISIT (OUTPATIENT)
Dept: POST ACUTE CARE | Facility: EXTERNAL LOCATION | Age: 74
End: 2024-04-24
Payer: COMMERCIAL

## 2024-04-24 VITALS
SYSTOLIC BLOOD PRESSURE: 136 MMHG | HEART RATE: 78 BPM | RESPIRATION RATE: 18 BRPM | TEMPERATURE: 97.6 F | DIASTOLIC BLOOD PRESSURE: 62 MMHG | OXYGEN SATURATION: 97 % | WEIGHT: 97 LBS | BODY MASS INDEX: 15.66 KG/M2

## 2024-04-24 DIAGNOSIS — G20.A1 PARKINSON'S DISEASE WITHOUT DYSKINESIA OR FLUCTUATING MANIFESTATIONS (MULTI): ICD-10-CM

## 2024-04-24 DIAGNOSIS — R53.81 DEBILITY: ICD-10-CM

## 2024-04-24 DIAGNOSIS — F03.90 DEMENTIA WITHOUT BEHAVIORAL DISTURBANCE (MULTI): ICD-10-CM

## 2024-04-24 DIAGNOSIS — F41.9 ANXIETY DISORDER, UNSPECIFIED TYPE: Primary | ICD-10-CM

## 2024-04-24 DIAGNOSIS — R64 CACHEXIA (MULTI): ICD-10-CM

## 2024-04-24 PROCEDURE — 99309 SBSQ NF CARE MODERATE MDM 30: CPT | Performed by: NURSE PRACTITIONER

## 2024-04-24 NOTE — LETTER
Patient: Snehal Souza  : 1950    Encounter Date: 2024    Subjective  Snehal Souza is a 74 y.o. female Here for routine monthly visit.    HPI There are no new problems and multiple health problems have been reviewed.  The course has been unchanged.  The patient  is moderately to severely  confused.   There are no family members present during time of visit.    she  is sitting up in chair,  denies any complaints when asked.  Eating and drinking well.   No concerns per staff.   She was started on low dose valium 10 due to rigidity and stiffness, likely anxiety from dementia and per therapy staff this has been effective.  She is awake and sitting in chair, saying a few words which is her baseline, no sx of excessive sedation.       Review of Systems   Constitutional:         Difficult to obtain due to dementia, denies any complaints when asked.        Objective  /62   Pulse 78   Temp 36.4 °C (97.6 °F)   Resp 18   Wt (!) 44 kg (97 lb)   SpO2 97%   BMI 15.66 kg/m²     Physical Exam  Constitutional:       General: She is not in acute distress.     Comments: Frail, cachectic elderly female sitting up in chair, no apparent distress   HENT:      Head: Normocephalic and atraumatic.   Eyes:      Conjunctiva/sclera: Conjunctivae normal.   Cardiovascular:      Rate and Rhythm: Normal rate and regular rhythm.   Pulmonary:      Effort: Pulmonary effort is normal. No respiratory distress.      Breath sounds: Normal breath sounds.   Abdominal:      General: Bowel sounds are normal. There is no distension.      Palpations: Abdomen is soft.      Tenderness: There is no abdominal tenderness.   Musculoskeletal:      Right lower leg: No edema.      Left lower leg: No edema.      Comments: diffusely decreased muscle mass     Skin:     General: Skin is warm and dry.   Neurological:      Mental Status: She is alert.      Comments: Oriented times 1   Psychiatric:         Mood and Affect: Mood normal.          Behavior: Behavior normal.         Assessment/Plan  Problem List Items Addressed This Visit       Dementia without behavioral disturbance (Multi)     Oriented times 1, no acute changes.   staff to monitor and notify for any changes.           Cachexia (Multi)     Thin, frail.  Monitor weights and oral intake.   Weight has been stable the last few months, upper 90's.         Anxiety disorder, unspecified - Primary     She was started on low dose diazepam and she is less rigid with therapy and care.   She is awake and talking intermittently which is her baseline, no sx of excessive sedation.  continue with current medical management             Debility     Requires assistance with ADL's.           Parkinson's disease without dyskinesia or fluctuating manifestations (Multi)     On sinemet.  continue with current medical management          labs/meds/orders reviewed  staff to monitor and notify for any changes.  continue with supportive and restorative care  Next labs 6/24 and prn      Electronically Signed By: AASHISH Cabello   4/24/24  1:35 PM

## 2024-04-24 NOTE — ASSESSMENT & PLAN NOTE
She was started on low dose diazepam and she is less rigid with therapy and care.   She is awake and talking intermittently which is her baseline, no sx of excessive sedation.  continue with current medical management

## 2024-04-24 NOTE — PROGRESS NOTES
Subjective   Snehal Souza is a 74 y.o. female Here for routine monthly visit.    HPI There are no new problems and multiple health problems have been reviewed.  The course has been unchanged.  The patient  is moderately to severely  confused.   There are no family members present during time of visit.    she  is sitting up in chair,  denies any complaints when asked.  Eating and drinking well.   No concerns per staff.   She was started on low dose valium 4/10 due to rigidity and stiffness, likely anxiety from dementia and per therapy staff this has been effective.  She is awake and sitting in chair, saying a few words which is her baseline, no sx of excessive sedation.       Review of Systems   Constitutional:         Difficult to obtain due to dementia, denies any complaints when asked.        Objective   /62   Pulse 78   Temp 36.4 °C (97.6 °F)   Resp 18   Wt (!) 44 kg (97 lb)   SpO2 97%   BMI 15.66 kg/m²     Physical Exam  Constitutional:       General: She is not in acute distress.     Comments: Frail, cachectic elderly female sitting up in chair, no apparent distress   HENT:      Head: Normocephalic and atraumatic.   Eyes:      Conjunctiva/sclera: Conjunctivae normal.   Cardiovascular:      Rate and Rhythm: Normal rate and regular rhythm.   Pulmonary:      Effort: Pulmonary effort is normal. No respiratory distress.      Breath sounds: Normal breath sounds.   Abdominal:      General: Bowel sounds are normal. There is no distension.      Palpations: Abdomen is soft.      Tenderness: There is no abdominal tenderness.   Musculoskeletal:      Right lower leg: No edema.      Left lower leg: No edema.      Comments: diffusely decreased muscle mass     Skin:     General: Skin is warm and dry.   Neurological:      Mental Status: She is alert.      Comments: Oriented times 1   Psychiatric:         Mood and Affect: Mood normal.         Behavior: Behavior normal.         Assessment/Plan   Problem List Items  Addressed This Visit       Dementia without behavioral disturbance (Multi)     Oriented times 1, no acute changes.   staff to monitor and notify for any changes.           Cachexia (Multi)     Thin, frail.  Monitor weights and oral intake.   Weight has been stable the last few months, upper 90's.         Anxiety disorder, unspecified - Primary     She was started on low dose diazepam and she is less rigid with therapy and care.   She is awake and talking intermittently which is her baseline, no sx of excessive sedation.  continue with current medical management             Debility     Requires assistance with ADL's.           Parkinson's disease without dyskinesia or fluctuating manifestations (Multi)     On sinemet.  continue with current medical management          labs/meds/orders reviewed  staff to monitor and notify for any changes.  continue with supportive and restorative care  Next labs 6/24 and prn

## 2024-04-24 NOTE — ASSESSMENT & PLAN NOTE
Thin, frail.  Monitor weights and oral intake.   Weight has been stable the last few months, upper 90's.

## 2024-04-26 ENCOUNTER — NURSING HOME VISIT (OUTPATIENT)
Dept: POST ACUTE CARE | Facility: EXTERNAL LOCATION | Age: 74
End: 2024-04-26
Payer: COMMERCIAL

## 2024-04-26 VITALS
DIASTOLIC BLOOD PRESSURE: 62 MMHG | TEMPERATURE: 97.6 F | OXYGEN SATURATION: 97 % | BODY MASS INDEX: 15.66 KG/M2 | WEIGHT: 97 LBS | HEART RATE: 78 BPM | SYSTOLIC BLOOD PRESSURE: 132 MMHG | RESPIRATION RATE: 18 BRPM

## 2024-04-26 DIAGNOSIS — G20.A1 PARKINSON'S DISEASE WITHOUT DYSKINESIA OR FLUCTUATING MANIFESTATIONS (MULTI): ICD-10-CM

## 2024-04-26 DIAGNOSIS — F41.9 ANXIETY DISORDER, UNSPECIFIED TYPE: ICD-10-CM

## 2024-04-26 DIAGNOSIS — F03.90 DEMENTIA WITHOUT BEHAVIORAL DISTURBANCE (MULTI): ICD-10-CM

## 2024-04-26 DIAGNOSIS — R53.81 DEBILITY: ICD-10-CM

## 2024-04-26 DIAGNOSIS — M79.10 MUSCLE TENSION PAIN: Primary | ICD-10-CM

## 2024-04-26 PROCEDURE — 99309 SBSQ NF CARE MODERATE MDM 30: CPT | Performed by: INTERNAL MEDICINE

## 2024-04-26 NOTE — LETTER
Patient: Snehal Souza  : 1950    Encounter Date: 2024    Subjective  Patient ID: Snehal Souza is a 74 y.o. female who is long term resident being seen and evaluated for multiple medical problems.    HPI   This 74-year-old female patient is resting comfortably in her chair in no distress.  She has recently been started on low-dose benzodiazepine for tension and spasticity of her muscles.  Per the nursing aides she is now feeding herself and is left stiff with hands-on care.  She is sitting up in the common area watching television does not appear fatigued at all.    Current high risk medication:  Sinemet  Namenda  Diazepam    Laboratory examination from 2024:  Potassium 4.0  Bicarbonate 28  Creatinine 0.55  Hemoglobin 12.1    Review of Systems   Constitutional:         Difficult to obtain due to dementia, denies any complaints when asked.        Objective  /62   Pulse 78   Temp 36.4 °C (97.6 °F)   Resp 18   Wt (!) 44 kg (97 lb)   SpO2 97%   BMI 15.66 kg/m²     Physical Exam  Constitutional:       General: She is not in acute distress.     Comments: Frail, cachectic elderly female sitting up in chair, no apparent distress   HENT:      Head: Normocephalic and atraumatic.   Eyes:      Conjunctiva/sclera: Conjunctivae normal.   Cardiovascular:      Rate and Rhythm: Normal rate and regular rhythm.   Pulmonary:      Effort: Pulmonary effort is normal. No respiratory distress.      Breath sounds: Normal breath sounds.   Abdominal:      General: Bowel sounds are normal. There is no distension.      Palpations: Abdomen is soft.      Tenderness: There is no abdominal tenderness.   Musculoskeletal:      Right lower leg: No edema.      Left lower leg: No edema.      Comments: diffusely decreased muscle mass     Skin:     General: Skin is warm and dry.   Neurological:      Mental Status: She is alert.      Comments: Oriented times 1   Psychiatric:         Mood and Affect: Mood normal.          Behavior: Behavior normal.         Assessment/Plan  Problem List Items Addressed This Visit             ICD-10-CM    Dementia without behavioral disturbance (Multi) F03.90    Anxiety disorder, unspecified F41.9    Debility R53.81    Parkinson's disease without dyskinesia or fluctuating manifestations (Multi) G20.A1    Muscle tension pain - Primary M79.10     8.  We will continue with restorative and supportive care as the patient tolerates    B.  Will continue low-dose diazepam to help with muscle tension felt secondary to anxiety relating to her dementia and worsening with hands-on care    C.  Laboratory examinations next be due in August 2024 as needed    D.  The patient's prognosis is guarded.        Electronically Signed By: Luis Fernando Zelaya MD   5/13/24  5:57 PM

## 2024-04-26 NOTE — PROGRESS NOTES
Subjective   Patient ID: Snehal Souza is a 74 y.o. female who is long term resident being seen and evaluated for multiple medical problems.    HPI   This 74-year-old female patient is resting comfortably in her chair in no distress.  She has recently been started on low-dose benzodiazepine for tension and spasticity of her muscles.  Per the nursing aides she is now feeding herself and is left stiff with hands-on care.  She is sitting up in the common area watching television does not appear fatigued at all.    Current high risk medication:  Sinemet  Namenda  Diazepam    Laboratory examination from February 2024:  Potassium 4.0  Bicarbonate 28  Creatinine 0.55  Hemoglobin 12.1    Review of Systems   Constitutional:         Difficult to obtain due to dementia, denies any complaints when asked.        Objective   /62   Pulse 78   Temp 36.4 °C (97.6 °F)   Resp 18   Wt (!) 44 kg (97 lb)   SpO2 97%   BMI 15.66 kg/m²     Physical Exam  Constitutional:       General: She is not in acute distress.     Comments: Frail, cachectic elderly female sitting up in chair, no apparent distress   HENT:      Head: Normocephalic and atraumatic.   Eyes:      Conjunctiva/sclera: Conjunctivae normal.   Cardiovascular:      Rate and Rhythm: Normal rate and regular rhythm.   Pulmonary:      Effort: Pulmonary effort is normal. No respiratory distress.      Breath sounds: Normal breath sounds.   Abdominal:      General: Bowel sounds are normal. There is no distension.      Palpations: Abdomen is soft.      Tenderness: There is no abdominal tenderness.   Musculoskeletal:      Right lower leg: No edema.      Left lower leg: No edema.      Comments: diffusely decreased muscle mass     Skin:     General: Skin is warm and dry.   Neurological:      Mental Status: She is alert.      Comments: Oriented times 1   Psychiatric:         Mood and Affect: Mood normal.         Behavior: Behavior normal.         Assessment/Plan   Problem List  Items Addressed This Visit             ICD-10-CM    Dementia without behavioral disturbance (Multi) F03.90    Anxiety disorder, unspecified F41.9    Debility R53.81    Parkinson's disease without dyskinesia or fluctuating manifestations (Multi) G20.A1    Muscle tension pain - Primary M79.10     8.  We will continue with restorative and supportive care as the patient tolerates    B.  Will continue low-dose diazepam to help with muscle tension felt secondary to anxiety relating to her dementia and worsening with hands-on care    C.  Laboratory examinations next be due in August 2024 as needed    D.  The patient's prognosis is guarded.

## 2024-06-07 ENCOUNTER — NURSING HOME VISIT (OUTPATIENT)
Dept: POST ACUTE CARE | Facility: EXTERNAL LOCATION | Age: 74
End: 2024-06-07
Payer: COMMERCIAL

## 2024-06-07 DIAGNOSIS — M79.10 MUSCLE TENSION PAIN: Primary | ICD-10-CM

## 2024-06-07 DIAGNOSIS — R53.81 DEBILITY: ICD-10-CM

## 2024-06-07 DIAGNOSIS — F41.9 ANXIETY: ICD-10-CM

## 2024-06-07 DIAGNOSIS — F03.90 DEMENTIA WITHOUT BEHAVIORAL DISTURBANCE (MULTI): ICD-10-CM

## 2024-06-07 DIAGNOSIS — G20.A1 PARKINSON'S DISEASE WITHOUT DYSKINESIA OR FLUCTUATING MANIFESTATIONS (MULTI): ICD-10-CM

## 2024-06-07 PROCEDURE — 99308 SBSQ NF CARE LOW MDM 20: CPT | Performed by: INTERNAL MEDICINE

## 2024-06-07 NOTE — LETTER
Patient: Snehal Souza  : 1950    Encounter Date: 2024    Subjective  Patient ID: nSehal Souza is a 74 y.o. female who is long term resident being seen and evaluated for multiple medical problems.    HPI   This 74-year-old female patient is resting comfortably in her room in no distress.  She remains quite confused but appears comfortable.  There have been no further complaints regarding the patient's muscular tension and spasm.    Current high risk medication:  Sinemet  Diazepam  Namenda    Laboratory examination from 2024 been reviewed in detail.    Review of Systems   Constitutional:         Difficult to obtain due to dementia, denies any complaints when asked.        Objective  /82   Pulse 89   Temp 36.7 °C (98 °F)   Resp 18   Wt (!) 43.5 kg (96 lb)   SpO2 96%   BMI 15.49 kg/m²     Physical Exam  Constitutional:       General: She is not in acute distress.     Comments: Frail, cachectic elderly female sitting up in chair, no apparent distress   HENT:      Head: Normocephalic and atraumatic.   Eyes:      Conjunctiva/sclera: Conjunctivae normal.   Cardiovascular:      Rate and Rhythm: Normal rate and regular rhythm.   Pulmonary:      Effort: Pulmonary effort is normal. No respiratory distress.      Breath sounds: Normal breath sounds.   Abdominal:      General: Bowel sounds are normal. There is no distension.      Palpations: Abdomen is soft.      Tenderness: There is no abdominal tenderness.   Musculoskeletal:      Right lower leg: No edema.      Left lower leg: No edema.      Comments: diffusely decreased muscle mass     Skin:     General: Skin is warm and dry.   Neurological:      Mental Status: She is alert.      Comments: Oriented times 1   Psychiatric:         Mood and Affect: Mood normal.         Behavior: Behavior normal.         Assessment/Plan  Problem List Items Addressed This Visit             ICD-10-CM    Dementia without behavioral disturbance (Multi) F03.90     Debility R53.81    Parkinson's disease without dyskinesia or fluctuating manifestations (Multi) G20.A1    Muscle tension pain - Primary M79.10    Anxiety F41.9       8.  We will continue with restorative and supportive care as the patient tolerates    B.  Laboratory examinations will next be due in August 2024 for a full lab panel    C.  The patient's prognosis is guarded.      Electronically Signed By: Luis Fernando Zelaya MD   6/11/24 10:59 AM

## 2024-06-09 VITALS
HEART RATE: 89 BPM | TEMPERATURE: 98 F | WEIGHT: 96 LBS | SYSTOLIC BLOOD PRESSURE: 138 MMHG | RESPIRATION RATE: 18 BRPM | OXYGEN SATURATION: 96 % | BODY MASS INDEX: 15.49 KG/M2 | DIASTOLIC BLOOD PRESSURE: 82 MMHG

## 2024-06-10 NOTE — PROGRESS NOTES
Subjective   Patient ID: Snehal Souza is a 74 y.o. female who is long term resident being seen and evaluated for multiple medical problems.    HPI   This 74-year-old female patient is resting comfortably in her room in no distress.  She remains quite confused but appears comfortable.  There have been no further complaints regarding the patient's muscular tension and spasm.    Current high risk medication:  Sinemet  Diazepam  Namenda    Laboratory examination from February 2024 been reviewed in detail.    Review of Systems   Constitutional:         Difficult to obtain due to dementia, denies any complaints when asked.        Objective   /82   Pulse 89   Temp 36.7 °C (98 °F)   Resp 18   Wt (!) 43.5 kg (96 lb)   SpO2 96%   BMI 15.49 kg/m²     Physical Exam  Constitutional:       General: She is not in acute distress.     Comments: Frail, cachectic elderly female sitting up in chair, no apparent distress   HENT:      Head: Normocephalic and atraumatic.   Eyes:      Conjunctiva/sclera: Conjunctivae normal.   Cardiovascular:      Rate and Rhythm: Normal rate and regular rhythm.   Pulmonary:      Effort: Pulmonary effort is normal. No respiratory distress.      Breath sounds: Normal breath sounds.   Abdominal:      General: Bowel sounds are normal. There is no distension.      Palpations: Abdomen is soft.      Tenderness: There is no abdominal tenderness.   Musculoskeletal:      Right lower leg: No edema.      Left lower leg: No edema.      Comments: diffusely decreased muscle mass     Skin:     General: Skin is warm and dry.   Neurological:      Mental Status: She is alert.      Comments: Oriented times 1   Psychiatric:         Mood and Affect: Mood normal.         Behavior: Behavior normal.         Assessment/Plan   Problem List Items Addressed This Visit             ICD-10-CM    Dementia without behavioral disturbance (Multi) F03.90    Debility R53.81    Parkinson's disease without dyskinesia or fluctuating  manifestations (Multi) G20.A1    Muscle tension pain - Primary M79.10    Anxiety F41.9       8.  We will continue with restorative and supportive care as the patient tolerates    B.  Laboratory examinations will next be due in August 2024 for a full lab panel    C.  The patient's prognosis is guarded.

## 2024-06-22 ENCOUNTER — APPOINTMENT (OUTPATIENT)
Dept: RADIOLOGY | Facility: HOSPITAL | Age: 74
End: 2024-06-22
Payer: COMMERCIAL

## 2024-06-22 ENCOUNTER — APPOINTMENT (OUTPATIENT)
Dept: CARDIOLOGY | Facility: HOSPITAL | Age: 74
End: 2024-06-22
Payer: COMMERCIAL

## 2024-06-22 ENCOUNTER — HOSPITAL ENCOUNTER (INPATIENT)
Facility: HOSPITAL | Age: 74
End: 2024-06-22
Attending: EMERGENCY MEDICINE | Admitting: INTERNAL MEDICINE
Payer: COMMERCIAL

## 2024-06-22 DIAGNOSIS — J96.01 ACUTE HYPOXIC RESPIRATORY FAILURE (MULTI): Primary | ICD-10-CM

## 2024-06-22 DIAGNOSIS — R68.89 EXCESSIVE ORAL SECRETIONS: ICD-10-CM

## 2024-06-22 DIAGNOSIS — F41.9 ANXIETY: ICD-10-CM

## 2024-06-22 DIAGNOSIS — T17.908A ASPIRATION INTO AIRWAY, INITIAL ENCOUNTER: ICD-10-CM

## 2024-06-22 DIAGNOSIS — R52 PAIN: ICD-10-CM

## 2024-06-22 PROBLEM — G20.A1 PARKINSON'S DISEASE WITHOUT DYSKINESIA OR FLUCTUATING MANIFESTATIONS (MULTI): Status: RESOLVED | Noted: 2023-08-30 | Resolved: 2024-06-22

## 2024-06-22 PROBLEM — B96.20 E. COLI URINARY TRACT INFECTION: Status: RESOLVED | Noted: 2023-09-13 | Resolved: 2024-06-22

## 2024-06-22 PROBLEM — F03.90 DEMENTIA WITHOUT BEHAVIORAL DISTURBANCE (MULTI): Status: RESOLVED | Noted: 2023-03-11 | Resolved: 2024-06-22

## 2024-06-22 PROBLEM — R64 CACHEXIA (MULTI): Status: RESOLVED | Noted: 2023-04-17 | Resolved: 2024-06-22

## 2024-06-22 PROBLEM — M79.10 MUSCLE TENSION PAIN: Status: RESOLVED | Noted: 2024-04-15 | Resolved: 2024-06-22

## 2024-06-22 PROBLEM — F33.40 RECURRENT MAJOR DEPRESSIVE DISORDER, IN REMISSION (CMS-HCC): Status: RESOLVED | Noted: 2024-03-19 | Resolved: 2024-06-22

## 2024-06-22 PROBLEM — G40.909 NONINTRACTABLE EPILEPSY WITHOUT STATUS EPILEPTICUS, UNSPECIFIED EPILEPSY TYPE (MULTI): Status: RESOLVED | Noted: 2024-03-19 | Resolved: 2024-06-22

## 2024-06-22 PROBLEM — Z85.3 HISTORY OF BREAST CANCER: Status: RESOLVED | Noted: 2023-03-11 | Resolved: 2024-06-22

## 2024-06-22 PROBLEM — E55.9 VITAMIN D DEFICIENCY: Status: RESOLVED | Noted: 2023-08-30 | Resolved: 2024-06-22

## 2024-06-22 PROBLEM — N39.0 E. COLI URINARY TRACT INFECTION: Status: RESOLVED | Noted: 2023-09-13 | Resolved: 2024-06-22

## 2024-06-22 PROBLEM — R40.4 TRANSIENT ALTERATION OF AWARENESS: Status: RESOLVED | Noted: 2023-06-28 | Resolved: 2024-06-22

## 2024-06-22 PROBLEM — R53.81 DEBILITY: Status: RESOLVED | Noted: 2023-04-19 | Resolved: 2024-06-22

## 2024-06-22 PROBLEM — C50.919 MALIGNANT NEOPLASM OF FEMALE BREAST, UNSPECIFIED ESTROGEN RECEPTOR STATUS, UNSPECIFIED LATERALITY, UNSPECIFIED SITE OF BREAST (MULTI): Status: RESOLVED | Noted: 2024-03-19 | Resolved: 2024-06-22

## 2024-06-22 LAB
ALBUMIN SERPL BCP-MCNC: 4 G/DL (ref 3.4–5)
ALP SERPL-CCNC: 65 U/L (ref 33–136)
ALT SERPL W P-5'-P-CCNC: <3 U/L (ref 7–45)
ANION GAP BLDV CALCULATED.4IONS-SCNC: 4 MMOL/L (ref 10–25)
ANION GAP SERPL CALC-SCNC: 15 MMOL/L (ref 10–20)
AST SERPL W P-5'-P-CCNC: 18 U/L (ref 9–39)
BASE EXCESS BLDV CALC-SCNC: 5.8 MMOL/L (ref -2–3)
BASOPHILS # BLD AUTO: 0.04 X10*3/UL (ref 0–0.1)
BASOPHILS NFR BLD AUTO: 0.7 %
BILIRUB SERPL-MCNC: 0.5 MG/DL (ref 0–1.2)
BODY TEMPERATURE: ABNORMAL
BUN SERPL-MCNC: 17 MG/DL (ref 6–23)
CA-I BLDV-SCNC: 1.18 MMOL/L (ref 1.1–1.33)
CALCIUM SERPL-MCNC: 9.2 MG/DL (ref 8.6–10.3)
CHLORIDE BLDV-SCNC: 104 MMOL/L (ref 98–107)
CHLORIDE SERPL-SCNC: 102 MMOL/L (ref 98–107)
CO2 SERPL-SCNC: 25 MMOL/L (ref 21–32)
CREAT SERPL-MCNC: 0.43 MG/DL (ref 0.5–1.05)
EGFRCR SERPLBLD CKD-EPI 2021: >90 ML/MIN/1.73M*2
EOSINOPHIL # BLD AUTO: 0.09 X10*3/UL (ref 0–0.4)
EOSINOPHIL NFR BLD AUTO: 1.5 %
ERYTHROCYTE [DISTWIDTH] IN BLOOD BY AUTOMATED COUNT: 13.3 % (ref 11.5–14.5)
GLUCOSE BLDV-MCNC: 96 MG/DL (ref 74–99)
GLUCOSE SERPL-MCNC: 91 MG/DL (ref 74–99)
HCO3 BLDV-SCNC: 30.6 MMOL/L (ref 22–26)
HCT VFR BLD AUTO: 42 % (ref 36–46)
HCT VFR BLD EST: 43 % (ref 36–46)
HGB BLD-MCNC: 13.7 G/DL (ref 12–16)
HGB BLDV-MCNC: 14.3 G/DL (ref 12–16)
IMM GRANULOCYTES # BLD AUTO: 0.01 X10*3/UL (ref 0–0.5)
IMM GRANULOCYTES NFR BLD AUTO: 0.2 % (ref 0–0.9)
INHALED O2 CONCENTRATION: 21 %
LACTATE BLDV-SCNC: 1 MMOL/L (ref 0.4–2)
LACTATE SERPL-SCNC: 0.7 MMOL/L (ref 0.4–2)
LYMPHOCYTES # BLD AUTO: 1.05 X10*3/UL (ref 0.8–3)
LYMPHOCYTES NFR BLD AUTO: 17.7 %
MCH RBC QN AUTO: 29.8 PG (ref 26–34)
MCHC RBC AUTO-ENTMCNC: 32.6 G/DL (ref 32–36)
MCV RBC AUTO: 91 FL (ref 80–100)
MONOCYTES # BLD AUTO: 0.42 X10*3/UL (ref 0.05–0.8)
MONOCYTES NFR BLD AUTO: 7.1 %
NEUTROPHILS # BLD AUTO: 4.33 X10*3/UL (ref 1.6–5.5)
NEUTROPHILS NFR BLD AUTO: 72.8 %
NRBC BLD-RTO: 0 /100 WBCS (ref 0–0)
OXYHGB MFR BLDV: 69.3 % (ref 45–75)
PCO2 BLDV: 44 MM HG (ref 41–51)
PH BLDV: 7.45 PH (ref 7.33–7.43)
PLATELET # BLD AUTO: 191 X10*3/UL (ref 150–450)
PO2 BLDV: 46 MM HG (ref 35–45)
POTASSIUM BLDV-SCNC: 4.9 MMOL/L (ref 3.5–5.3)
POTASSIUM SERPL-SCNC: 4.5 MMOL/L (ref 3.5–5.3)
PROT SERPL-MCNC: 6.8 G/DL (ref 6.4–8.2)
RBC # BLD AUTO: 4.6 X10*6/UL (ref 4–5.2)
SAO2 % BLDV: 70 % (ref 45–75)
SODIUM BLDV-SCNC: 134 MMOL/L (ref 136–145)
SODIUM SERPL-SCNC: 137 MMOL/L (ref 136–145)
WBC # BLD AUTO: 5.9 X10*3/UL (ref 4.4–11.3)

## 2024-06-22 PROCEDURE — 2500000001 HC RX 250 WO HCPCS SELF ADMINISTERED DRUGS (ALT 637 FOR MEDICARE OP): Performed by: NURSE PRACTITIONER

## 2024-06-22 PROCEDURE — 93005 ELECTROCARDIOGRAM TRACING: CPT

## 2024-06-22 PROCEDURE — 71275 CT ANGIOGRAPHY CHEST: CPT | Performed by: RADIOLOGY

## 2024-06-22 PROCEDURE — 1200000002 HC GENERAL ROOM WITH TELEMETRY DAILY

## 2024-06-22 PROCEDURE — 71045 X-RAY EXAM CHEST 1 VIEW: CPT | Performed by: RADIOLOGY

## 2024-06-22 PROCEDURE — 2550000001 HC RX 255 CONTRASTS: Performed by: EMERGENCY MEDICINE

## 2024-06-22 PROCEDURE — 71045 X-RAY EXAM CHEST 1 VIEW: CPT

## 2024-06-22 PROCEDURE — 85025 COMPLETE CBC W/AUTO DIFF WBC: CPT | Performed by: EMERGENCY MEDICINE

## 2024-06-22 PROCEDURE — 36415 COLL VENOUS BLD VENIPUNCTURE: CPT | Performed by: EMERGENCY MEDICINE

## 2024-06-22 PROCEDURE — 99285 EMERGENCY DEPT VISIT HI MDM: CPT

## 2024-06-22 PROCEDURE — 71275 CT ANGIOGRAPHY CHEST: CPT

## 2024-06-22 PROCEDURE — 2500000005 HC RX 250 GENERAL PHARMACY W/O HCPCS: Performed by: NURSE PRACTITIONER

## 2024-06-22 PROCEDURE — 84132 ASSAY OF SERUM POTASSIUM: CPT | Performed by: EMERGENCY MEDICINE

## 2024-06-22 PROCEDURE — 2500000004 HC RX 250 GENERAL PHARMACY W/ HCPCS (ALT 636 FOR OP/ED): Performed by: NURSE PRACTITIONER

## 2024-06-22 PROCEDURE — 87040 BLOOD CULTURE FOR BACTERIA: CPT | Mod: 91,STJLAB | Performed by: EMERGENCY MEDICINE

## 2024-06-22 PROCEDURE — 82435 ASSAY OF BLOOD CHLORIDE: CPT | Performed by: EMERGENCY MEDICINE

## 2024-06-22 PROCEDURE — 83605 ASSAY OF LACTIC ACID: CPT | Mod: 91 | Performed by: EMERGENCY MEDICINE

## 2024-06-22 PROCEDURE — 2500000005 HC RX 250 GENERAL PHARMACY W/O HCPCS: Performed by: EMERGENCY MEDICINE

## 2024-06-22 PROCEDURE — 99285 EMERGENCY DEPT VISIT HI MDM: CPT | Performed by: EMERGENCY MEDICINE

## 2024-06-22 RX ORDER — DIAZEPAM 5 MG/ML
2 INJECTION, SOLUTION INTRAMUSCULAR; INTRAVENOUS 2 TIMES DAILY
Status: DISCONTINUED | OUTPATIENT
Start: 2024-06-22 | End: 2024-06-27 | Stop reason: HOSPADM

## 2024-06-22 RX ORDER — CHLORHEXIDINE GLUCONATE ORAL RINSE 1.2 MG/ML
15 SOLUTION DENTAL 2 TIMES DAILY
Status: DISCONTINUED | OUTPATIENT
Start: 2024-06-22 | End: 2024-06-27 | Stop reason: HOSPADM

## 2024-06-22 RX ORDER — SODIUM CHLORIDE, SODIUM LACTATE, POTASSIUM CHLORIDE, CALCIUM CHLORIDE 600; 310; 30; 20 MG/100ML; MG/100ML; MG/100ML; MG/100ML
50 INJECTION, SOLUTION INTRAVENOUS CONTINUOUS
Status: ACTIVE | OUTPATIENT
Start: 2024-06-22 | End: 2024-06-23

## 2024-06-22 RX ORDER — ACETAMINOPHEN 650 MG/1
650 SUPPOSITORY RECTAL EVERY 4 HOURS PRN
Status: DISCONTINUED | OUTPATIENT
Start: 2024-06-22 | End: 2024-06-27 | Stop reason: HOSPADM

## 2024-06-22 RX ORDER — ONDANSETRON HYDROCHLORIDE 2 MG/ML
4 INJECTION, SOLUTION INTRAVENOUS EVERY 8 HOURS PRN
Status: DISCONTINUED | OUTPATIENT
Start: 2024-06-22 | End: 2024-06-27 | Stop reason: HOSPADM

## 2024-06-22 RX ORDER — SODIUM CHLORIDE 0.9 % (FLUSH) 0.9 %
10 SYRINGE (ML) INJECTION EVERY 8 HOURS SCHEDULED
Status: DISCONTINUED | OUTPATIENT
Start: 2024-06-22 | End: 2024-06-27 | Stop reason: HOSPADM

## 2024-06-22 RX ADMIN — Medication 10 ML: at 22:40

## 2024-06-22 RX ADMIN — Medication 6 L/MIN: at 20:00

## 2024-06-22 RX ADMIN — IOHEXOL 60 ML: 350 INJECTION, SOLUTION INTRAVENOUS at 19:16

## 2024-06-22 RX ADMIN — Medication 6 L/MIN: at 17:40

## 2024-06-22 RX ADMIN — SODIUM CHLORIDE, POTASSIUM CHLORIDE, SODIUM LACTATE AND CALCIUM CHLORIDE 50 ML/HR: 600; 310; 30; 20 INJECTION, SOLUTION INTRAVENOUS at 22:49

## 2024-06-22 RX ADMIN — Medication 4 L/MIN: at 22:29

## 2024-06-22 RX ADMIN — CHLORHEXIDINE GLUCONATE 0.12% ORAL RINSE 15 ML: 1.2 LIQUID ORAL at 22:49

## 2024-06-22 ASSESSMENT — PAIN - FUNCTIONAL ASSESSMENT
PAIN_FUNCTIONAL_ASSESSMENT: WONG-BAKER FACES
PAIN_FUNCTIONAL_ASSESSMENT: FLACC (FACE, LEGS, ACTIVITY, CRY, CONSOLABILITY)

## 2024-06-22 ASSESSMENT — COGNITIVE AND FUNCTIONAL STATUS - GENERAL
TOILETING: TOTAL
MOVING TO AND FROM BED TO CHAIR: TOTAL
HELP NEEDED FOR BATHING: TOTAL
EATING MEALS: TOTAL
PERSONAL GROOMING: TOTAL
DAILY ACTIVITIY SCORE: 6
DRESSING REGULAR LOWER BODY CLOTHING: TOTAL
TURNING FROM BACK TO SIDE WHILE IN FLAT BAD: TOTAL
MOVING FROM LYING ON BACK TO SITTING ON SIDE OF FLAT BED WITH BEDRAILS: TOTAL
CLIMB 3 TO 5 STEPS WITH RAILING: TOTAL
WALKING IN HOSPITAL ROOM: TOTAL
MOBILITY SCORE: 6
DRESSING REGULAR UPPER BODY CLOTHING: TOTAL
STANDING UP FROM CHAIR USING ARMS: TOTAL

## 2024-06-22 ASSESSMENT — COLUMBIA-SUICIDE SEVERITY RATING SCALE - C-SSRS
6. HAVE YOU EVER DONE ANYTHING, STARTED TO DO ANYTHING, OR PREPARED TO DO ANYTHING TO END YOUR LIFE?: NO
1. IN THE PAST MONTH, HAVE YOU WISHED YOU WERE DEAD OR WISHED YOU COULD GO TO SLEEP AND NOT WAKE UP?: NO
2. HAVE YOU ACTUALLY HAD ANY THOUGHTS OF KILLING YOURSELF?: NO

## 2024-06-22 ASSESSMENT — PAIN SCALES - WONG BAKER: WONGBAKER_NUMERICALRESPONSE: NO HURT

## 2024-06-22 NOTE — ED PROVIDER NOTES
HPI   Chief Complaint   Patient presents with    Choking     Patient being fed pills in applesauce and choked. Oxygen saturation dropped into 60's per [facility]. EMS reports 83% on RA       74-year-old female with past medical history of Alzheimer's dementia, Parkinson's disease presenting to the ED from nursing facility after an episode of choking.  EMS report that nursing staff state patient's baseline is AO x 0 and she is nonverbal.  She has a puréed diet and takes her medications with applesauce.  They gave her medications about 30 minutes prior to arrival and she had an episode of choking.  Afterwards they were monitoring her and her oxygen levels desaturated as low as 60%.  She was 83% on room air for EMS on their arrival.  They placed her on nasal cannula.  Remainder of history is limited due to patient's mental status.      She is DNR [facility] however family at bedside states they are okay with intubation if needed.                          Carlos Coma Scale Score: 14                     Patient History   Past Medical History:   Diagnosis Date    Anxiety disorder, unspecified 2016    Anxiety    Personal history of other diseases of the musculoskeletal system and connective tissue 2018    History of osteopenia     Past Surgical History:   Procedure Laterality Date    BREAST SURGERY  2018    Breast Surgery     SECTION, CLASSIC  2016     Section     No family history on file.  Social History     Tobacco Use    Smoking status: Unknown    Smokeless tobacco: Not on file   Substance Use Topics    Alcohol use: Not on file    Drug use: Not on file       Physical Exam   ED Triage Vitals [24 1731]   Temp Heart Rate Resp BP   -- 86 -- 139/67      Pulse Ox Temp src Heart Rate Source Patient Position   (!) 93 % -- Monitor Sitting      BP Location FiO2 (%)     Left arm --       Physical Exam  Vitals and nursing note reviewed.   Constitutional:       General: She is not in  acute distress.     Appearance: She is ill-appearing. She is not toxic-appearing.   HENT:      Head: Normocephalic and atraumatic.      Nose: Nose normal.      Mouth/Throat:      Mouth: Mucous membranes are dry.   Eyes:      Extraocular Movements: Extraocular movements intact.   Cardiovascular:      Rate and Rhythm: Normal rate and regular rhythm.      Pulses: Normal pulses.      Heart sounds: Normal heart sounds.   Pulmonary:      Effort: Pulmonary effort is normal.      Breath sounds: Rhonchi present.   Abdominal:      General: There is no distension.      Palpations: Abdomen is soft.      Tenderness: There is no abdominal tenderness.   Musculoskeletal:      Cervical back: Normal range of motion and neck supple.      Right lower leg: No edema.      Left lower leg: No edema.      Comments: Contracted at the hips bilaterally   Skin:     General: Skin is warm and dry.      Capillary Refill: Capillary refill takes less than 2 seconds.   Neurological:      General: No focal deficit present.      Mental Status: She is alert. Mental status is at baseline.         ED Course & MDM   Diagnoses as of 06/22/24 2114   Aspiration into airway, initial encounter   Acute hypoxic respiratory failure (Multi)       Medical Decision Making  74-year-old female presenting to the ED for hypoxia at her nursing facility after choking episode while taking her meds with applesauce.  Saturating 60% at SNF and 83% for EMS.  She was placed on 6 L and saturating 92-93% on 6 L in the ED.  She is ill-appearing at baseline and oriented x 0.  Appears to be protecting her airway at this time therefore he is not a candidate for intubation.  We will continue to monitor her respiratory status.    Labs obtained and were noncontributory.  VBG does not show evidence of CO2 narcosis.  Chest x-ray without infiltrates or evidence of pulmonary edema.  Will obtain CT angio to rule out PE contributing to hypoxia.    CT is negative for PE or pneumonia however  does show opacity in the trachea and opacification of the left mainstem bronchus and left lower lobe bronchi compatible with aspiration.  Patient is remaining 93% on 4 L nasal cannula which is a new oxygen requirement.  We will hospitalize for monitoring of her respiratory status and pulmonology consult for possible bronchoscopy.        Procedure  Procedures     Toney Ellis DO  Resident  06/22/24 2111       Toney Ellis DO  Resident  06/22/24 2114

## 2024-06-23 ENCOUNTER — APPOINTMENT (OUTPATIENT)
Dept: RADIOLOGY | Facility: HOSPITAL | Age: 74
End: 2024-06-23
Payer: COMMERCIAL

## 2024-06-23 VITALS
TEMPERATURE: 98.6 F | BODY MASS INDEX: 14.18 KG/M2 | DIASTOLIC BLOOD PRESSURE: 64 MMHG | OXYGEN SATURATION: 97 % | HEART RATE: 95 BPM | RESPIRATION RATE: 17 BRPM | HEIGHT: 65 IN | SYSTOLIC BLOOD PRESSURE: 114 MMHG | WEIGHT: 85.1 LBS

## 2024-06-23 LAB
ANION GAP SERPL CALC-SCNC: 15 MMOL/L (ref 10–20)
APPEARANCE UR: CLEAR
BACTERIA #/AREA URNS AUTO: ABNORMAL /HPF
BILIRUB UR STRIP.AUTO-MCNC: NEGATIVE MG/DL
BUN SERPL-MCNC: 14 MG/DL (ref 6–23)
CALCIUM SERPL-MCNC: 9 MG/DL (ref 8.6–10.3)
CAOX CRY #/AREA UR COMP ASSIST: ABNORMAL /HPF
CHLORIDE SERPL-SCNC: 105 MMOL/L (ref 98–107)
CO2 SERPL-SCNC: 23 MMOL/L (ref 21–32)
COLOR UR: YELLOW
CREAT SERPL-MCNC: 0.4 MG/DL (ref 0.5–1.05)
EGFRCR SERPLBLD CKD-EPI 2021: >90 ML/MIN/1.73M*2
ERYTHROCYTE [DISTWIDTH] IN BLOOD BY AUTOMATED COUNT: 13.4 % (ref 11.5–14.5)
GLUCOSE BLD MANUAL STRIP-MCNC: 163 MG/DL (ref 74–99)
GLUCOSE BLD MANUAL STRIP-MCNC: 56 MG/DL (ref 74–99)
GLUCOSE SERPL-MCNC: 64 MG/DL (ref 74–99)
GLUCOSE UR STRIP.AUTO-MCNC: ABNORMAL MG/DL
HCT VFR BLD AUTO: 43.1 % (ref 36–46)
HGB BLD-MCNC: 13.5 G/DL (ref 12–16)
KETONES UR STRIP.AUTO-MCNC: ABNORMAL MG/DL
LEUKOCYTE ESTERASE UR QL STRIP.AUTO: ABNORMAL
MCH RBC QN AUTO: 29.5 PG (ref 26–34)
MCHC RBC AUTO-ENTMCNC: 31.3 G/DL (ref 32–36)
MCV RBC AUTO: 94 FL (ref 80–100)
MUCOUS THREADS #/AREA URNS AUTO: ABNORMAL /LPF
NITRITE UR QL STRIP.AUTO: ABNORMAL
NRBC BLD-RTO: 0 /100 WBCS (ref 0–0)
PH UR STRIP.AUTO: 5.5 [PH]
PLATELET # BLD AUTO: 183 X10*3/UL (ref 150–450)
POTASSIUM SERPL-SCNC: 3.9 MMOL/L (ref 3.5–5.3)
PROT UR STRIP.AUTO-MCNC: ABNORMAL MG/DL
RBC # BLD AUTO: 4.58 X10*6/UL (ref 4–5.2)
RBC # UR STRIP.AUTO: NEGATIVE /UL
RBC #/AREA URNS AUTO: ABNORMAL /HPF
SODIUM SERPL-SCNC: 139 MMOL/L (ref 136–145)
SP GR UR STRIP.AUTO: 1.04
SQUAMOUS #/AREA URNS AUTO: ABNORMAL /HPF
UROBILINOGEN UR STRIP.AUTO-MCNC: NORMAL MG/DL
WBC # BLD AUTO: 8 X10*3/UL (ref 4.4–11.3)
WBC #/AREA URNS AUTO: ABNORMAL /HPF

## 2024-06-23 PROCEDURE — 71045 X-RAY EXAM CHEST 1 VIEW: CPT | Performed by: RADIOLOGY

## 2024-06-23 PROCEDURE — 87086 URINE CULTURE/COLONY COUNT: CPT | Mod: STJLAB | Performed by: NURSE PRACTITIONER

## 2024-06-23 PROCEDURE — 99222 1ST HOSP IP/OBS MODERATE 55: CPT | Performed by: INTERNAL MEDICINE

## 2024-06-23 PROCEDURE — 36415 COLL VENOUS BLD VENIPUNCTURE: CPT | Performed by: NURSE PRACTITIONER

## 2024-06-23 PROCEDURE — 71045 X-RAY EXAM CHEST 1 VIEW: CPT

## 2024-06-23 PROCEDURE — 2500000004 HC RX 250 GENERAL PHARMACY W/ HCPCS (ALT 636 FOR OP/ED): Performed by: INTERNAL MEDICINE

## 2024-06-23 PROCEDURE — 80048 BASIC METABOLIC PNL TOTAL CA: CPT | Performed by: NURSE PRACTITIONER

## 2024-06-23 PROCEDURE — 2500000005 HC RX 250 GENERAL PHARMACY W/O HCPCS: Performed by: NURSE PRACTITIONER

## 2024-06-23 PROCEDURE — 1200000002 HC GENERAL ROOM WITH TELEMETRY DAILY

## 2024-06-23 PROCEDURE — 92610 EVALUATE SWALLOWING FUNCTION: CPT | Mod: GN | Performed by: SPEECH-LANGUAGE PATHOLOGIST

## 2024-06-23 PROCEDURE — 2500000001 HC RX 250 WO HCPCS SELF ADMINISTERED DRUGS (ALT 637 FOR MEDICARE OP): Performed by: NURSE PRACTITIONER

## 2024-06-23 PROCEDURE — 2500000004 HC RX 250 GENERAL PHARMACY W/ HCPCS (ALT 636 FOR OP/ED): Performed by: NURSE PRACTITIONER

## 2024-06-23 PROCEDURE — 81001 URINALYSIS AUTO W/SCOPE: CPT | Performed by: NURSE PRACTITIONER

## 2024-06-23 PROCEDURE — 82947 ASSAY GLUCOSE BLOOD QUANT: CPT | Mod: 91

## 2024-06-23 PROCEDURE — 85027 COMPLETE CBC AUTOMATED: CPT | Performed by: NURSE PRACTITIONER

## 2024-06-23 RX ORDER — MEMANTINE HYDROCHLORIDE 10 MG/1
10 TABLET ORAL 2 TIMES DAILY
COMMUNITY
End: 2024-06-27 | Stop reason: HOSPADM

## 2024-06-23 RX ORDER — MEMANTINE HYDROCHLORIDE 10 MG/1
10 TABLET ORAL 2 TIMES DAILY
Status: DISCONTINUED | OUTPATIENT
Start: 2024-06-23 | End: 2024-06-27 | Stop reason: HOSPADM

## 2024-06-23 RX ORDER — DEXTROSE 50 % IN WATER (D50W) INTRAVENOUS SYRINGE
25
Status: DISCONTINUED | OUTPATIENT
Start: 2024-06-23 | End: 2024-06-27 | Stop reason: HOSPADM

## 2024-06-23 RX ORDER — ADHESIVE BANDAGE
30 BANDAGE TOPICAL DAILY PRN
COMMUNITY
End: 2024-06-27 | Stop reason: HOSPADM

## 2024-06-23 RX ORDER — ACETAMINOPHEN 325 MG/1
650 TABLET ORAL EVERY 6 HOURS PRN
COMMUNITY
End: 2024-06-27 | Stop reason: HOSPADM

## 2024-06-23 RX ORDER — FLUTICASONE PROPIONATE 50 MCG
2 SPRAY, SUSPENSION (ML) NASAL DAILY
Status: DISCONTINUED | OUTPATIENT
Start: 2024-06-23 | End: 2024-06-27 | Stop reason: HOSPADM

## 2024-06-23 RX ORDER — CHOLECALCIFEROL (VITAMIN D3) 50 MCG
50 TABLET ORAL DAILY
COMMUNITY
End: 2024-06-27 | Stop reason: HOSPADM

## 2024-06-23 RX ORDER — CARBIDOPA AND LEVODOPA 25; 100 MG/1; MG/1
1 TABLET ORAL 3 TIMES DAILY
COMMUNITY
End: 2024-06-27 | Stop reason: HOSPADM

## 2024-06-23 RX ORDER — DOCUSATE SODIUM 100 MG/1
100 CAPSULE, LIQUID FILLED ORAL 2 TIMES DAILY
Status: DISCONTINUED | OUTPATIENT
Start: 2024-06-23 | End: 2024-06-27 | Stop reason: HOSPADM

## 2024-06-23 RX ORDER — DEXTROSE 50 % IN WATER (D50W) INTRAVENOUS SYRINGE
12.5
Status: DISCONTINUED | OUTPATIENT
Start: 2024-06-23 | End: 2024-06-27 | Stop reason: HOSPADM

## 2024-06-23 RX ORDER — GUAIFENESIN 400 MG/1
400 TABLET ORAL 2 TIMES DAILY PRN
COMMUNITY
End: 2024-06-27 | Stop reason: HOSPADM

## 2024-06-23 RX ORDER — CARBIDOPA AND LEVODOPA 25; 100 MG/1; MG/1
1 TABLET ORAL 3 TIMES DAILY
Status: DISCONTINUED | OUTPATIENT
Start: 2024-06-23 | End: 2024-06-27 | Stop reason: HOSPADM

## 2024-06-23 RX ORDER — DIAZEPAM 5 MG/1
2.5 TABLET ORAL 2 TIMES DAILY PRN
Status: DISCONTINUED | OUTPATIENT
Start: 2024-06-23 | End: 2024-06-27 | Stop reason: HOSPADM

## 2024-06-23 RX ORDER — BISACODYL 10 MG/1
10 SUPPOSITORY RECTAL DAILY PRN
COMMUNITY
End: 2024-06-27 | Stop reason: HOSPADM

## 2024-06-23 RX ORDER — BISACODYL 10 MG/1
10 SUPPOSITORY RECTAL DAILY PRN
Status: DISCONTINUED | OUTPATIENT
Start: 2024-06-23 | End: 2024-06-27 | Stop reason: HOSPADM

## 2024-06-23 RX ORDER — DOCUSATE SODIUM 100 MG/1
100 CAPSULE, LIQUID FILLED ORAL 2 TIMES DAILY
COMMUNITY
End: 2024-06-27 | Stop reason: HOSPADM

## 2024-06-23 RX ORDER — FLUTICASONE PROPIONATE 50 MCG
2 SPRAY, SUSPENSION (ML) NASAL DAILY
COMMUNITY
End: 2024-06-27 | Stop reason: HOSPADM

## 2024-06-23 RX ADMIN — Medication 6 L/MIN: at 23:14

## 2024-06-23 RX ADMIN — Medication 10 L/MIN: at 20:31

## 2024-06-23 RX ADMIN — SODIUM CHLORIDE, POTASSIUM CHLORIDE, SODIUM LACTATE AND CALCIUM CHLORIDE 50 ML/HR: 600; 310; 30; 20 INJECTION, SOLUTION INTRAVENOUS at 16:58

## 2024-06-23 RX ADMIN — CHLORHEXIDINE GLUCONATE 0.12% ORAL RINSE 15 ML: 1.2 LIQUID ORAL at 09:51

## 2024-06-23 RX ADMIN — CHLORHEXIDINE GLUCONATE 0.12% ORAL RINSE 15 ML: 1.2 LIQUID ORAL at 20:31

## 2024-06-23 RX ADMIN — AMPICILLIN SODIUM AND SULBACTAM SODIUM 3 G: 2; 1 INJECTION, POWDER, FOR SOLUTION INTRAMUSCULAR; INTRAVENOUS at 23:03

## 2024-06-23 RX ADMIN — Medication 6 L/MIN: at 19:27

## 2024-06-23 RX ADMIN — Medication 10 ML: at 20:31

## 2024-06-23 RX ADMIN — AMPICILLIN SODIUM AND SULBACTAM SODIUM 3 G: 2; 1 INJECTION, POWDER, FOR SOLUTION INTRAMUSCULAR; INTRAVENOUS at 16:57

## 2024-06-23 RX ADMIN — DEXTROSE MONOHYDRATE 12.5 G: 25 INJECTION, SOLUTION INTRAVENOUS at 11:46

## 2024-06-23 RX ADMIN — Medication 10 ML: at 14:32

## 2024-06-23 SDOH — SOCIAL STABILITY: SOCIAL INSECURITY: HAVE YOU HAD ANY THOUGHTS OF HARMING ANYONE ELSE?: UNABLE TO ASSESS

## 2024-06-23 SDOH — SOCIAL STABILITY: SOCIAL INSECURITY: DO YOU FEEL ANYONE HAS EXPLOITED OR TAKEN ADVANTAGE OF YOU FINANCIALLY OR OF YOUR PERSONAL PROPERTY?: UNABLE TO ASSESS

## 2024-06-23 SDOH — SOCIAL STABILITY: SOCIAL INSECURITY: DO YOU FEEL UNSAFE GOING BACK TO THE PLACE WHERE YOU ARE LIVING?: UNABLE TO ASSESS

## 2024-06-23 SDOH — SOCIAL STABILITY: SOCIAL INSECURITY: ABUSE: ADULT

## 2024-06-23 SDOH — SOCIAL STABILITY: SOCIAL INSECURITY: ARE YOU OR HAVE YOU BEEN THREATENED OR ABUSED PHYSICALLY, EMOTIONALLY, OR SEXUALLY BY ANYONE?: UNABLE TO ASSESS

## 2024-06-23 SDOH — SOCIAL STABILITY: SOCIAL INSECURITY: HAVE YOU HAD THOUGHTS OF HARMING ANYONE ELSE?: UNABLE TO ASSESS

## 2024-06-23 SDOH — SOCIAL STABILITY: SOCIAL INSECURITY: HAS ANYONE EVER THREATENED TO HURT YOUR FAMILY OR YOUR PETS?: UNABLE TO ASSESS

## 2024-06-23 SDOH — SOCIAL STABILITY: SOCIAL INSECURITY: DOES ANYONE TRY TO KEEP YOU FROM HAVING/CONTACTING OTHER FRIENDS OR DOING THINGS OUTSIDE YOUR HOME?: UNABLE TO ASSESS

## 2024-06-23 SDOH — SOCIAL STABILITY: SOCIAL INSECURITY: WERE YOU ABLE TO COMPLETE ALL THE BEHAVIORAL HEALTH SCREENINGS?: NO

## 2024-06-23 SDOH — SOCIAL STABILITY: SOCIAL INSECURITY: ARE THERE ANY APPARENT SIGNS OF INJURIES/BEHAVIORS THAT COULD BE RELATED TO ABUSE/NEGLECT?: UNABLE TO ASSESS

## 2024-06-23 ASSESSMENT — LIFESTYLE VARIABLES
SKIP TO QUESTIONS 9-10: 0
HOW OFTEN DO YOU HAVE 6 OR MORE DRINKS ON ONE OCCASION: PATIENT DECLINED
HOW OFTEN DO YOU HAVE A DRINK CONTAINING ALCOHOL: PATIENT DECLINED
HOW MANY STANDARD DRINKS CONTAINING ALCOHOL DO YOU HAVE ON A TYPICAL DAY: PATIENT DECLINED
AUDIT-C TOTAL SCORE: -1
AUDIT-C TOTAL SCORE: -1

## 2024-06-23 ASSESSMENT — ACTIVITIES OF DAILY LIVING (ADL)
JUDGMENT_ADEQUATE_SAFELY_COMPLETE_DAILY_ACTIVITIES: UNABLE TO ASSESS
ADEQUATE_TO_COMPLETE_ADL: UNABLE TO ASSESS
GROOMING: UNABLE TO ASSESS
LACK_OF_TRANSPORTATION: PATIENT UNABLE TO ANSWER
TOILETING: UNABLE TO ASSESS
LACK_OF_TRANSPORTATION: PATIENT UNABLE TO ANSWER
WALKS IN HOME: UNABLE TO ASSESS
PATIENT'S MEMORY ADEQUATE TO SAFELY COMPLETE DAILY ACTIVITIES?: UNABLE TO ASSESS
FEEDING YOURSELF: UNABLE TO ASSESS
BATHING: UNABLE TO ASSESS
ASSISTIVE_DEVICE: OTHER (COMMENT)
DRESSING YOURSELF: UNABLE TO ASSESS
HEARING - RIGHT EAR: UNABLE TO ASSESS
HEARING - LEFT EAR: UNABLE TO ASSESS

## 2024-06-23 ASSESSMENT — COGNITIVE AND FUNCTIONAL STATUS - GENERAL
DAILY ACTIVITIY SCORE: 6
TOILETING: TOTAL
STANDING UP FROM CHAIR USING ARMS: TOTAL
MOVING FROM LYING ON BACK TO SITTING ON SIDE OF FLAT BED WITH BEDRAILS: TOTAL
TURNING FROM BACK TO SIDE WHILE IN FLAT BAD: TOTAL
PATIENT BASELINE BEDBOUND: UNABLE TO ASSESS AT THIS TIME
MOVING FROM LYING ON BACK TO SITTING ON SIDE OF FLAT BED WITH BEDRAILS: TOTAL
WALKING IN HOSPITAL ROOM: TOTAL
MOVING TO AND FROM BED TO CHAIR: TOTAL
CLIMB 3 TO 5 STEPS WITH RAILING: TOTAL
PERSONAL GROOMING: TOTAL
TOILETING: TOTAL
WALKING IN HOSPITAL ROOM: TOTAL
DRESSING REGULAR LOWER BODY CLOTHING: TOTAL
MOVING TO AND FROM BED TO CHAIR: TOTAL
TURNING FROM BACK TO SIDE WHILE IN FLAT BAD: TOTAL
MOBILITY SCORE: 6
CLIMB 3 TO 5 STEPS WITH RAILING: TOTAL
EATING MEALS: TOTAL
MOBILITY SCORE: 6
STANDING UP FROM CHAIR USING ARMS: TOTAL
DAILY ACTIVITIY SCORE: 6
DRESSING REGULAR UPPER BODY CLOTHING: TOTAL
DRESSING REGULAR LOWER BODY CLOTHING: TOTAL
HELP NEEDED FOR BATHING: TOTAL
HELP NEEDED FOR BATHING: TOTAL
PERSONAL GROOMING: TOTAL
DRESSING REGULAR UPPER BODY CLOTHING: TOTAL
EATING MEALS: TOTAL

## 2024-06-23 ASSESSMENT — PATIENT HEALTH QUESTIONNAIRE - PHQ9
2. FEELING DOWN, DEPRESSED OR HOPELESS: NOT AT ALL
1. LITTLE INTEREST OR PLEASURE IN DOING THINGS: NOT AT ALL
SUM OF ALL RESPONSES TO PHQ9 QUESTIONS 1 & 2: 0

## 2024-06-23 NOTE — CONSULTS
Department of Medicine  Division of Pulmonary, Critical Care, and Sleep Medicine    Reason For Consult  I was asked by Dr. Palomo to evaluate Ms. Snehal Souza for aspiration pneumonia.    History Of Present Illness  Snehal Souza is a 74 y.o. female with past medical history of dementia, Parkinson disease, breast cancer status post left partial mastectomy, and seizure disorder who presented to the hospital from nursing home facility after a witnessed aspiration event.  History was mainly obtained from patient's daughter and medical records given patient's dementia.  According to daughter, the patient was being given her nighttime medications smashed in applesauce when she began choking and coughing.  Subsequently, she was noted to be hypoxemic requiring supplemental oxygen.  EMS was called and the patient was transported to [facility] ED.  She required to be placed on 6 L of nasal cannula to maintain O2 saturation.  CT scan of chest revealed endobronchial secretions in the distal trachea and the left main stem.  The patient was admitted to the hospital for observation and potential need of bronchoscopy.  Per daughter, the patient looks much better today with no acute respiratory distress.  Her oxygen requirement has decreased to 3 L/min.  She is awaiting speech evaluation.       Past Medical History:  Past Medical History:   Diagnosis Date    Ambulatory dysfunction     Anxiety and depression     Cachexia (Multi)     Chronic prescription benzodiazepine use     Coronary artery disease     Dysphagia, oropharyngeal     History of cancer of left breast     History of closed head injury     History of COVID-19 01/2022    History of hip fracture     Hyperlipidemia     Irritable bowel syndrome with diarrhea     Lewy body dementia (Multi)     Osteopenia     Osteoporosis     Parkinson's disease (Multi)     Pulmonary nodule     Recurrent falls     Seizure disorder (Multi)     Underweight     Vitamin D  "deficiency     Wheelchair dependence        Surgical History:  Past Surgical History:   Procedure Laterality Date     SECTION, CLASSIC      HIP FRACTURE SURGERY Left     MASTECTOMY, PARTIAL Left        Social History:   Social History     Tobacco Use    Smoking status: Never    Smokeless tobacco: Never       Family History:  Family History   Problem Relation Name Age of Onset    Kidney disease Mother      Lung cancer Father      Throat cancer Father          Vital Signs  Visit Vitals  /67   Pulse 79   Temp 35.9 °C (96.6 °F) (Temporal)   Resp 20   Ht 1.651 m (5' 5\")   Wt (!) 38.6 kg (85 lb 1.6 oz)   SpO2 97%   BMI 14.16 kg/m²   Smoking Status Never   BSA 1.33 m²        Physical Exam  Vitals and nursing note reviewed.   Constitutional:       General: Cachectic, chronically ill-appearing     Laying in bed, opens eyes to verbal stimuli.  Does not follow commands.  HENT:      Head: Normocephalic and atraumatic.      Mouth/Throat:      Mouth: Mucous membranes are dry  Eyes:      Conjunctiva/sclera: Conjunctivae normal.   Cardiovascular:      Rate and Rhythm: Normal rate and regular rhythm.   Pulmonary:      Effort: Pulmonary effort is normal. No respiratory distress.      Breath sounds: Clear to auscultation on anterior chest exam  Skin:     General: Skin is warm and dry.      Coloration: Skin is not jaundiced.   Neurological:      General: No focal deficit present.      Mental Status: Somnolent.    Oxygen Therapy  SpO2: 97 %  Medical Gas Therapy: Supplemental oxygen  O2 Delivery Method: Nasal cannula         Medications   Scheduled medications  chlorhexidine, 15 mL, Mouth/Throat, BID  diazePAM, 2 mg, intravenous, BID  sodium chloride 0.9%, 10 mL, intravenous, q8h ANGIE      Continuous medications  lactated Ringer's, 50 mL/hr, Last Rate: 50 mL/hr (24 2549)      PRN medications  PRN medications: acetaminophen, moisturizing mouth, ondansetron, oxygen     Allergies  Patient has no known allergies.      Lab " Results     Lab Results   Component Value Date    WBC 8.0 06/23/2024    HGB 13.5 06/23/2024    HCT 43.1 06/23/2024    MCV 94 06/23/2024     06/23/2024      Lab Results   Component Value Date    GLUCOSE 64 (L) 06/23/2024    CALCIUM 9.0 06/23/2024     06/23/2024    K 3.9 06/23/2024    CO2 23 06/23/2024     06/23/2024    BUN 14 06/23/2024    CREATININE 0.40 (L) 06/23/2024      Lab Results   Component Value Date    ALT <3 (L) 06/22/2024    AST 18 06/22/2024    ALKPHOS 65 06/22/2024    BILITOT 0.5 06/22/2024        Chest Radiograph   CT angio chest for pulmonary embolism 06/22/2024    Narrative  Interpreted By:  Rafael Molina,  STUDY:  CT ANGIO CHEST FOR PULMONARY EMBOLISM;  6/22/2024 7:25 pm    INDICATION:  Signs/Symptoms:hypoxic to 60s at [facility] r/o pe.    COMPARISON:  None.    ACCESSION NUMBER(S):  [id number]    ORDERING CLINICIAN:  DONNA MONCADA    TECHNIQUE:  Contiguous axial images of the chest were obtained after the  intravenous administration of  contrast. Coronal and sagittal  reformatted images were obtained from the axial images. MIPS of the  chest were also performed and reviewed and from the findings of the  examination.    FINDINGS:  No axillary, mediastinal, or hilar lymphadenopathy.    The heart is normal in size. Coronary artery atherosclerotic  calcifications. No significant pericardial effusion. No evidence of  acute central, main, lobar or proximal segmental pulmonary embolism.    Evaluation of the lungs is limited secondary motion. There is opacity  in the trachea and opacification left mainstem bronchus and bronchi  in the left lower lobe compatible with aspiration. There is mild left  basilar opacity and small left pleural effusion. No pneumothorax.    Limited evaluation of the upper abdomen.  Nonobstructive right renal calculi.    Multilevel degenerative change of the thoracic spine. There is mild  subacute or chronic appearing height loss of several vertebral  bodies.    Impression  No evidence of acute pulmonary embolism.    Opacity in trachea and opacification of left mainstem bronchus and  bronchi in the left lower lobe compatible with aspiration. Small left  pleural effusion and mild left basilar opacity.    MACRO:  None    Signed by: Rafael Molina 6/22/2024 8:15 PM  Dictation workstation:   [id number]      XR chest 1 view 06/22/2024    Narrative  Interpreted By:  Estrada Balbuena,  STUDY:  XR CHEST 1 VIEW;  6/22/2024 6:19 pm    INDICATION:  Signs/Symptoms:aspirated, hypoxic.    COMPARISON:  11/18/2022    ACCESSION NUMBER(S):  [id number]    ORDERING CLINICIAN:  DONNA MONCADA    FINDINGS:  CARDIOMEDIASTINAL SILHOUETTE AND VASCULATURE:    Cardiac size:  Within normal limits.  Aortic shadow:  Within normal limits.    Mediastinal contours: Within normal limits.    Pulmonary vasculature:  The central vasculature is unremarkable    LUNGS:  Lungs are clear.    ABDOMEN AND OTHER FINDINGS:  No remarkable upper abdominal findings.    BONES:  No acute osseous changes.    Impression  1.  No active cardiopulmonary disease.  There has not been  significant interval change from the prior exam.    Signed by: Estrada Balbuena 6/22/2024 6:31 PM  Dictation workstation:   [id number]         Assessment and Plan / Recommendations   Assessment/Plan     1.  Acute hypoxemic respiratory failure  2.  Aspiration pneumonia  3.  Dementia  4.  Oropharyngeal dysphagia   5.  Failure to thrive    The patient does not appear to be in acute respiratory distress today.  Oxygen requirement has improved since ER presentation.  Aspiration material is reported to be smashed medications in an applesauce.  No solid food or other foreign bodies suspected.  I discussed with patient's daughter that bronchoscopy would be indicated for airway exam though given her comorbidities, conservative route is reasonable at this time especially with her clinical stability/improvement.  Will start IV Unasyn for aspiration  pneumonia for the next 3-5 days.  Repeat chest x-ray in a.m. tomorrow.  The patient is n.p.o. at this time awaiting speech evaluation.  Will continue to follow-up her hospital stay with you.      Ricardo Rees MD    Please excuse any misspellings or unintended errors related to the Dragon speech recognition software used to dictate this note.

## 2024-06-23 NOTE — NURSING NOTE
2204- Pt. Up to the floor from the ED.  Belongings with pt. Include slipper socks.  Pt. Is arousable to verbal stimuli and looks toward staff when speaking, however, is very non-interactive.    Tele applied.  Continuous pulse ox at pt's R foot and sating well on 4lLnc.  Bed alarm on.  Call light within reach.    2230- Pt. Currently on 4Lnc.  Portable pulse ox placed at bedside.  Pt. Sating at 100% on 4L then attempted to wean to 3L and then 2L.  Tolerated initially and then pt. Desated to 86% after falling asleep.  Supplemental o2 increased back to 4Lnc and tolerating well with sats back to %.       1245 -  Consult called to Dr. Cat's answering service at [phone].  Spoke with Marifer.  Return number given in case of further questions.      1450 - Call received from a Dr. Kincaid, pulmonologist from [facility] in response to the consult request.  Physician states he does not come to [facility].  Will call answering service again to clarify.      9758 - Call placed to [phone] for consult.   Caller states they do not accept adult pt.s, forwarded to  for assistance.  Spoke with [facility] who states the number for pulmonology is [phone] or there is an after hours number.  Again forwarded to answering service and spoke with someone else.   Expressed that the consult would be forwarded to a different physician.  Stated to caller that I would defer to day team to reach out to Dr. Cat's office during normal business hours.  Will make oncoming nurse aware.

## 2024-06-23 NOTE — H&P
History Of Present Illness  Snehal Souza is a 74 y.o. female with medical history significant for Lewy body dementia with minimally verbal baseline, remote breast cancer status post left partial mastectomy, coronary artery disease, anxiety on chronic prescription benzodiazepine, seizure disorder, remote COVID in January 2022, and cachexia with BMI of 14 presenting to [facility] on 6/22/2024 with complaint of witnessed aspiration.    According to staff at her facility, Snehal was being given her nighttime medications in applesauce which is typical for her when she began choking and coughing.  Chart review indicates that she has had a documented degree of dysphagia for some time and has been seen by speech therapy.  At the time of my examination the emergency department her mucous membranes and teeth are very dry with adherent plaques.    Snehal was reportedly saturating in the 60s immediately after her aspiration event and she was placed on nasal cannula oxygen while awaiting EMS.  Her saturation was reportedly in the 80s en route to the emergency department and she was finally placed on 6 L nasal cannula with pulse ox in the low 90s.    Labs tonight are remarkable for venous pO2 of 46 on 6 L nasal cannula.  Imaging suggests aspiration with debris in her trachea, left mainstem bronchus, and multiple areas of her left lower lobe.  She has since been titrated down to 4 L nasal cannula and is in no obvious distress.  She does not meet sepsis criteria at this time.  Chest x-ray is ordered in the morning for surveillance and there is a low threshold for initiating antibiotic should she begin to show signs of infectious process.    Emergency department staff spoke with pulmonology on-call who agreed to be on consult with consideration for possible bronchoscopy.  CODE STATUS was confirmed with her family as DO NOT RESUSCITATE.    Plan is to admit as an inpatient for further evaluation of her acute hypoxic respiratory failure  following an aspiration event.    Past Medical History  Past Medical History:   Diagnosis Date    Ambulatory dysfunction     Anxiety and depression     Cachexia (Multi)     Chronic prescription benzodiazepine use     Coronary artery disease     Dysphagia, oropharyngeal     History of cancer of left breast     History of closed head injury     History of COVID-19 2022    Hyperlipidemia     Irritable bowel syndrome with diarrhea     Lewy body dementia (Multi)     Osteopenia     Osteoporosis     Parkinson's disease (Multi)     Pulmonary nodule     Recurrent falls     Seizure disorder (Multi)     Underweight     Vitamin D deficiency     Wheelchair dependence        Surgical History  Past Surgical History:   Procedure Laterality Date     SECTION, CLASSIC      HIP FRACTURE SURGERY Left     MASTECTOMY, PARTIAL Left         Social History  Social History     Tobacco Use    Smoking status: Never    Smokeless tobacco: Never        Family History  Family History   Problem Relation Name Age of Onset    Kidney disease Mother      Lung cancer Father      Throat cancer Father          Allergies  Patient has no known allergies.    Review of Systems   Unable to perform ROS: Dementia            Physical Exam  Constitutional:       General: She is not in acute distress.     Appearance: She is not ill-appearing or toxic-appearing.      Comments: Cachectic   HENT:      Right Ear: External ear normal.      Left Ear: External ear normal.      Nose: Nose normal.      Mouth/Throat:      Mouth: Mucous membranes are dry.   Eyes:      General: No scleral icterus.  Cardiovascular:      Rate and Rhythm: Normal rate.   Pulmonary:      Effort: No respiratory distress.      Breath sounds: No rhonchi or rales.   Abdominal:      General: Bowel sounds are normal.      Palpations: Abdomen is soft.   Musculoskeletal:      Cervical back: No tenderness.      Right lower leg: No edema.      Left lower leg: No edema.   Skin:     General: Skin is  "dry.      Capillary Refill: Capillary refill takes less than 2 seconds.   Neurological:      Mental Status: Mental status is at baseline.   Psychiatric:      Comments: No behavior disturbance             Last Recorded Vitals  Visit Vitals  /67   Pulse 79   Temp 35.9 °C (96.6 °F)   Resp 20   Ht 1.651 m (5' 5\")   Wt (!) 38.6 kg (85 lb 1.6 oz)   SpO2 97%   BMI 14.16 kg/m²   Smoking Status Never   BSA 1.33 m²        Relevant Results  Results for orders placed or performed during the hospital encounter of 06/22/24 (from the past 24 hour(s))   CBC and Auto Differential   Result Value Ref Range    WBC 5.9 4.4 - 11.3 x10*3/uL    nRBC 0.0 0.0 - 0.0 /100 WBCs    RBC 4.60 4.00 - 5.20 x10*6/uL    Hemoglobin 13.7 12.0 - 16.0 g/dL    Hematocrit 42.0 36.0 - 46.0 %    MCV 91 80 - 100 fL    MCH 29.8 26.0 - 34.0 pg    MCHC 32.6 32.0 - 36.0 g/dL    RDW 13.3 11.5 - 14.5 %    Platelets 191 150 - 450 x10*3/uL    Neutrophils % 72.8 40.0 - 80.0 %    Immature Granulocytes %, Automated 0.2 0.0 - 0.9 %    Lymphocytes % 17.7 13.0 - 44.0 %    Monocytes % 7.1 2.0 - 10.0 %    Eosinophils % 1.5 0.0 - 6.0 %    Basophils % 0.7 0.0 - 2.0 %    Neutrophils Absolute 4.33 1.60 - 5.50 x10*3/uL    Immature Granulocytes Absolute, Automated 0.01 0.00 - 0.50 x10*3/uL    Lymphocytes Absolute 1.05 0.80 - 3.00 x10*3/uL    Monocytes Absolute 0.42 0.05 - 0.80 x10*3/uL    Eosinophils Absolute 0.09 0.00 - 0.40 x10*3/uL    Basophils Absolute 0.04 0.00 - 0.10 x10*3/uL   Comprehensive Metabolic Panel   Result Value Ref Range    Glucose 91 74 - 99 mg/dL    Sodium 137 136 - 145 mmol/L    Potassium 4.5 3.5 - 5.3 mmol/L    Chloride 102 98 - 107 mmol/L    Bicarbonate 25 21 - 32 mmol/L    Anion Gap 15 10 - 20 mmol/L    Urea Nitrogen 17 6 - 23 mg/dL    Creatinine 0.43 (L) 0.50 - 1.05 mg/dL    eGFR >90 >60 mL/min/1.73m*2    Calcium 9.2 8.6 - 10.3 mg/dL    Albumin 4.0 3.4 - 5.0 g/dL    Alkaline Phosphatase 65 33 - 136 U/L    Total Protein 6.8 6.4 - 8.2 g/dL    AST 18 9 - " 39 U/L    Bilirubin, Total 0.5 0.0 - 1.2 mg/dL    ALT <3 (L) 7 - 45 U/L   Blood Culture    Specimen: Peripheral Venipuncture; Blood culture   Result Value Ref Range    Blood Culture Loaded on Instrument - Culture in progress    Blood Culture    Specimen: Peripheral Venipuncture; Blood culture   Result Value Ref Range    Blood Culture Loaded on Instrument - Culture in progress    Lactate   Result Value Ref Range    Lactate 0.7 0.4 - 2.0 mmol/L   Blood Gas Venous Full Panel   Result Value Ref Range    POCT pH, Venous 7.45 (H) 7.33 - 7.43 pH    POCT pCO2, Venous 44 41 - 51 mm Hg    POCT pO2, Venous 46 (H) 35 - 45 mm Hg    POCT SO2, Venous 70 45 - 75 %    POCT Oxy Hemoglobin, Venous 69.3 45.0 - 75.0 %    POCT Hematocrit Calculated, Venous 43.0 36.0 - 46.0 %    POCT Sodium, Venous 134 (L) 136 - 145 mmol/L    POCT Potassium, Venous 4.9 3.5 - 5.3 mmol/L    POCT Chloride, Venous 104 98 - 107 mmol/L    POCT Ionized Calicum, Venous 1.18 1.10 - 1.33 mmol/L    POCT Glucose, Venous 96 74 - 99 mg/dL    POCT Lactate, Venous 1.0 0.4 - 2.0 mmol/L    POCT Base Excess, Venous 5.8 (H) -2.0 - 3.0 mmol/L    POCT HCO3 Calculated, Venous 30.6 (H) 22.0 - 26.0 mmol/L    POCT Hemoglobin, Venous 14.3 12.0 - 16.0 g/dL    POCT Anion Gap, Venous 4.0 (L) 10.0 - 25.0 mmol/L    Patient Temperature      FiO2 21 %        Home Medications  Prior to Admission medications    Medication Sig Start Date End Date Taking? Authorizing Provider   diazePAM (Valium) 5 mg tablet Take 1/2 tab (2.5 mg) BID 4/10/24   Yanni Thacker APRN-JASON       Medications  Scheduled medications  chlorhexidine, 15 mL, Mouth/Throat, BID  diazePAM, 2 mg, intravenous, BID  sodium chloride 0.9%, 10 mL, intravenous, q8h [facility] hospital      Continuous medications  lactated Ringer's, 50 mL/hr, Last Rate: 50 mL/hr (06/22/24 1113)      PRN medications  acetaminophen, 650 mg, q4h PRN  moisturizing mouth, 1 spray, PRN  ondansetron, 4 mg, q8h PRN  oxygen, , Continuous PRN - O2/gases        Imaging  CT  angio chest for pulmonary embolism    Result Date: 6/22/2024  Interpreted By:  Rafael Molina, STUDY: CT ANGIO CHEST FOR PULMONARY EMBOLISM;  6/22/2024 7:25 pm   INDICATION: Signs/Symptoms:hypoxic to 60s at snf r/o pe.   COMPARISON: None.   ACCESSION NUMBER(S): [id number]   ORDERING CLINICIAN: DONNA MONCADA   TECHNIQUE: Contiguous axial images of the chest were obtained after the intravenous administration of  contrast. Coronal and sagittal reformatted images were obtained from the axial images. MIPS of the chest were also performed and reviewed and from the findings of the examination.   FINDINGS: No axillary, mediastinal, or hilar lymphadenopathy.   The heart is normal in size. Coronary artery atherosclerotic calcifications. No significant pericardial effusion. No evidence of acute central, main, lobar or proximal segmental pulmonary embolism.   Evaluation of the lungs is limited secondary motion. There is opacity in the trachea and opacification left mainstem bronchus and bronchi in the left lower lobe compatible with aspiration. There is mild left basilar opacity and small left pleural effusion. No pneumothorax.   Limited evaluation of the upper abdomen. Nonobstructive right renal calculi.   Multilevel degenerative change of the thoracic spine. There is mild subacute or chronic appearing height loss of several vertebral bodies.       No evidence of acute pulmonary embolism.   Opacity in trachea and opacification of left mainstem bronchus and bronchi in the left lower lobe compatible with aspiration. Small left pleural effusion and mild left basilar opacity.   MACRO: None   Signed by: Rafael Molina 6/22/2024 8:15 PM Dictation workstation:   [id number]    XR chest 1 view    Result Date: 6/22/2024  Interpreted By:  Estrada Balbuena, STUDY: XR CHEST 1 VIEW;  6/22/2024 6:19 pm   INDICATION: Signs/Symptoms:aspirated, hypoxic.   COMPARISON: 11/18/2022   ACCESSION NUMBER(S): [id number]   ORDERING CLINICIAN: DONNA  SPIRNAK   FINDINGS: CARDIOMEDIASTINAL SILHOUETTE AND VASCULATURE:   Cardiac size:  Within normal limits. Aortic shadow:  Within normal limits.   Mediastinal contours: Within normal limits.   Pulmonary vasculature:  The central vasculature is unremarkable   LUNGS: Lungs are clear.   ABDOMEN AND OTHER FINDINGS: No remarkable upper abdominal findings.   BONES: No acute osseous changes.       1.  No active cardiopulmonary disease.  There has not been significant interval change from the prior exam.   Signed by: Estrada Balbuena 6/22/2024 6:31 PM Dictation workstation:   [id number]       Assessment/Plan   Principal Problem:    Acute hypoxic respiratory failure (Multi)  Active Problems:    Aspiration into airway        Plan:  Strictly nothing by mouth, continuous pulse oximetry, speech evaluation, mouth care 4 times daily, aspiration precautions, pulmonology consult, continuous pulse oximetry    Plan to resume home medications as appropriate, see orders for additional plan elements, management deferred to attending and consult physicians.    VTE prophylaxis:   DVT prophylaxis: SCDs      Code Status  DNR    I spent 40 minutes in the professional and overall care of this patient.      Haritha Jacobo, RADHA-CNP  [facility] pager [phone]

## 2024-06-23 NOTE — PROGRESS NOTES
Speech-Language Pathology    SLP Adult Inpatient Speech-Language Pathology Clinical Swallow Evaluation    Patient Name: Snehal Souza  MRN: [id number]  Today's Date: 6/23/2024   Time Calculation  Start Time: 1315  Stop Time: 1325  Time Calculation (min): 10 min         Current Problem:   1. Acute hypoxic respiratory failure (Multi)        2. Aspiration into airway, initial encounter            SLP Assessment:  Pt presents with severe-profound dysphagia characterized by wet respiratory quality post swallow, significantly delayed swallow onset, suspected premature spillage, limited alertness, delayed A-P transit, deficits with bolus formation and control, and delayed throat clearing.    Pt is not appropriate for participation in skilled ST services nor MBS at this time d/t very limited alertness and inconsistent responsiveness to stimuli and bolus presentations. Nursing to place order for reassessment should pt's status improve.    SLP TX Intervention Outcomes: Goals Established  Prognosis: N/A  Treatment Tolerance: Patient tolerated treatment with limited responsiveness  Medical Staff Made Aware: Yes  Strengths: Family support (daughter present)  Barriers: Alertness, cognition    Plan:  Inpatient/Swing Bed or Outpatient: Inpatient  Treatment/Interventions:   SLP Plan: Evaluation only  Diet Recommendations:   Solid Consistency: NPO  Liquid Consistency: NPO  Discussed POC: Patient, Nursing, Daughter  Discussed Risks/Benefits: Yes  Patient/Caregiver Agreeable: Yes    Goals:  N/A d/t evaluation only    Subjective   Current Problem:   74 y.o. female admitted to the hospital status post aspiration at the nursing home patient was eating and she choked on food became hypoxic.    General Visit Information:  Chart Reviewed: Yes  Patient Class: Inpatient  Living Environment: SNF  Ordering Physician: Dewey  Reason for Referral: dysphagia  Referred By: Dewey  Past Medical History Relevant to Rehab: Ambulatory dysfunction,  Anxiety and depression, Cachexia (Multi), Chronic prescription benzodiazepine use, Coronary artery disease, Dysphagia, oropharyngeal, History of cancer of left breast, History of closed head injury, History of COVID-19 (01/2022), History of hip fracture, Hyperlipidemia, Irritable bowel syndrome with diarrhea, Lewy body dementia (Multi), Osteopenia, Osteoporosis, Parkinson's disease (Multi), Pulmonary nodule, Recurrent falls, Seizure disorder (Multi), Underweight, Vitamin D deficiency, and Wheelchair dependence   Prior Level of Function: WFL  [employer] Diet: Regular/Thin  Current Diet : NPO  Prior to Session Communication: Nurse    Objective:  Pain:  Pain Assessment: 0-10  Pain Score: 0  Dysphagia Diagnosis: severe dysphagia    Baseline Assessment:  Behavior/Cognition: impaired at baseline  Patient Positioning: upright in bed  Baseline Vocal Quality: WFL  Volitional Cough: Unable to elicit  Volitional Swallow: Unable to elicit      Oral/Motor Assessment:  Vocal Quality: Unable to determine d/t no verbal output  Vocal Quality Impairment: Unknown d/t no verbal output  Management of secretions: WFL  Lingual ROM and strength: Grossly intact, unable to complete oral mech exam  Labial ROM and strength: Grossly intact, unable to complete oral mech exam    Consistencies Trialed:  Consistencies Trialed: Yes  Consistencies Trialed: Thin via cup      Clinical Observations:  Patient Positioning: Upright in bed  Management of Oral Secretions: WFL  Was The 3 oz Swallow Protocol Completed: Yes- Fail  Signs/Symptoms of Aspiration: wet respiratory quality post swallow, significantly delayed swallow onset, suspected premature spillage, limited alertness, delayed A-P transit, deficits with bolus formation and control, and delayed throat clearing  Prolonged Oral Manipulation: N/A  Immediate Cough: N/A  Delayed Cough: Thin    Inpatient Education:  Education Documentation  Pt and dtr educated on results of bedside swallow evaluation,  recommendation for continued NPO, and no recommendation for skilled ST treatment at this time d/t pt lack of responsiveness.  Education Comments  Pt's dtr reported understanding.    Recommendations:  Risk for Aspiration: Yes  Additional Recommendations: To re-assess should pt's alertness level improve with new order placed  Solid Diet Recommendations : NPO  Liquid Diet Recommendations: NPO  Compensatory Swallowing Strategies: N/A d/t NPO    Consultations/Referrals/Coordination of Services: N/A

## 2024-06-23 NOTE — H&P
History Of Present Illness  Snehal Souza is a 74 y.o. female presenting with past medical history of Lewy body dementia coronary artery disease admitted to the hospital status post aspiration at the nursing home patient was eating and she choked on food became hypoxic.     Past Medical History  She has a past medical history of Ambulatory dysfunction, Anxiety and depression, Cachexia (Multi), Chronic prescription benzodiazepine use, Coronary artery disease, Dysphagia, oropharyngeal, History of cancer of left breast, History of closed head injury, History of COVID-19 (2022), History of hip fracture, Hyperlipidemia, Irritable bowel syndrome with diarrhea, Lewy body dementia (Multi), Osteopenia, Osteoporosis, Parkinson's disease (Multi), Pulmonary nodule, Recurrent falls, Seizure disorder (Multi), Underweight, Vitamin D deficiency, and Wheelchair dependence.    Surgical History  She has a past surgical history that includes  section, classic; Mastectomy, partial (Left); and Hip fracture surgery (Left).     Social History  She reports that she has never smoked. She has never used smokeless tobacco. No history on file for alcohol use and drug use.    Family History  Family History   Problem Relation Name Age of Onset    Kidney disease Mother      Lung cancer Father      Throat cancer Father          Allergies  Patient has no known allergies.    Review of Systems   Patient had severe dementia cannot give any history Case was discussed with her daughter who said her mom at baseline     Physical Exam  HENT:      Right Ear: External ear normal.      Left Ear: External ear normal.      Mouth/Throat:      Mouth: Mucous membranes are moist.   Cardiovascular:      Rate and Rhythm: Normal rate and regular rhythm.      Heart sounds: No murmur heard.     No friction rub. No gallop.   Pulmonary:      Effort: No accessory muscle usage or respiratory distress.      Breath sounds: No stridor. No wheezing or rhonchi.  "  Chest:      Chest wall: No tenderness.   Abdominal:      General: There is no distension.      Palpations: There is no mass.      Tenderness: There is no abdominal tenderness. There is no guarding or rebound.   Musculoskeletal:         General: No deformity or signs of injury.      Cervical back: No rigidity or tenderness. Normal range of motion.      Right lower leg: No edema.      Left lower leg: No edema.   Skin:     Coloration: Skin is not jaundiced or pale.      Findings: No lesion.   Neurological:   Dementia minimally responsive       Last Recorded Vitals  Blood pressure 113/67, pulse 79, temperature 35.9 °C (96.6 °F), temperature source Temporal, resp. rate 20, height 1.651 m (5' 5\"), weight (!) 38.6 kg (85 lb 1.6 oz), SpO2 97%.    Labs    Admission on 06/22/2024   Component Date Value Ref Range Status    WBC 06/22/2024 5.9  4.4 - 11.3 x10*3/uL Final    nRBC 06/22/2024 0.0  0.0 - 0.0 /100 WBCs Final    RBC 06/22/2024 4.60  4.00 - 5.20 x10*6/uL Final    Hemoglobin 06/22/2024 13.7  12.0 - 16.0 g/dL Final    Hematocrit 06/22/2024 42.0  36.0 - 46.0 % Final    MCV 06/22/2024 91  80 - 100 fL Final    MCH 06/22/2024 29.8  26.0 - 34.0 pg Final    MCHC 06/22/2024 32.6  32.0 - 36.0 g/dL Final    RDW 06/22/2024 13.3  11.5 - 14.5 % Final    Platelets 06/22/2024 191  150 - 450 x10*3/uL Final    Neutrophils % 06/22/2024 72.8  40.0 - 80.0 % Final    Immature Granulocytes %, Automated 06/22/2024 0.2  0.0 - 0.9 % Final    Immature Granulocyte Count (IG) includes promyelocytes, myelocytes and metamyelocytes but does not include bands. Percent differential counts (%) should be interpreted in the context of the absolute cell counts (cells/UL).    Lymphocytes % 06/22/2024 17.7  13.0 - 44.0 % Final    Monocytes % 06/22/2024 7.1  2.0 - 10.0 % Final    Eosinophils % 06/22/2024 1.5  0.0 - 6.0 % Final    Basophils % 06/22/2024 0.7  0.0 - 2.0 % Final    Neutrophils Absolute 06/22/2024 4.33  1.60 - 5.50 x10*3/uL Final    Percent " differential counts (%) should be interpreted in the context of the absolute cell counts (cells/uL).    Immature Granulocytes Absolute, Au* 06/22/2024 0.01  0.00 - 0.50 x10*3/uL Final    Lymphocytes Absolute 06/22/2024 1.05  0.80 - 3.00 x10*3/uL Final    Monocytes Absolute 06/22/2024 0.42  0.05 - 0.80 x10*3/uL Final    Eosinophils Absolute 06/22/2024 0.09  0.00 - 0.40 x10*3/uL Final    Basophils Absolute 06/22/2024 0.04  0.00 - 0.10 x10*3/uL Final    Glucose 06/22/2024 91  74 - 99 mg/dL Final    Sodium 06/22/2024 137  136 - 145 mmol/L Final    Potassium 06/22/2024 4.5  3.5 - 5.3 mmol/L Final    MILD HEMOLYSIS DETECTED. The result may be falsely elevated due to hemolysis or other interferents. Clinical correlation is recommended. Repeat testing may be considered.    Chloride 06/22/2024 102  98 - 107 mmol/L Final    Bicarbonate 06/22/2024 25  21 - 32 mmol/L Final    Anion Gap 06/22/2024 15  10 - 20 mmol/L Final    Urea Nitrogen 06/22/2024 17  6 - 23 mg/dL Final    Creatinine 06/22/2024 0.43 (L)  0.50 - 1.05 mg/dL Final    eGFR 06/22/2024 >90  >60 mL/min/1.73m*2 Final    Calculations of estimated GFR are performed using the 2021 CKD-EPI Study Refit equation without the race variable for the IDMS-Traceable creatinine methods.  https://jasn.asnjournals.org/content/early/2021/09/22/ASN.2740488146    Calcium 06/22/2024 9.2  8.6 - 10.3 mg/dL Final    Albumin 06/22/2024 4.0  3.4 - 5.0 g/dL Final    Alkaline Phosphatase 06/22/2024 65  33 - 136 U/L Final    Total Protein 06/22/2024 6.8  6.4 - 8.2 g/dL Final    AST 06/22/2024 18  9 - 39 U/L Final    MILD HEMOLYSIS DETECTED. The result may be falsely elevated due to hemolysis or other interferents. Clinical correlation is recommended. Repeat testing may be considered.    Bilirubin, Total 06/22/2024 0.5  0.0 - 1.2 mg/dL Final    ALT 06/22/2024 <3 (L)  7 - 45 U/L Final    Patients treated with Sulfasalazine may generate falsely decreased results for ALT.    Lactate 06/22/2024 0.7   0.4 - 2.0 mmol/L Final    Blood Culture 06/22/2024 Loaded on Instrument - Culture in progress   Preliminary    Blood Culture 06/22/2024 Loaded on Instrument - Culture in progress   Preliminary    POCT pH, Venous 06/22/2024 7.45 (H)  7.33 - 7.43 pH Final    POCT pCO2, Venous 06/22/2024 44  41 - 51 mm Hg Final    POCT pO2, Venous 06/22/2024 46 (H)  35 - 45 mm Hg Final    POCT SO2, Venous 06/22/2024 70  45 - 75 % Final    POCT Oxy Hemoglobin, Venous 06/22/2024 69.3  45.0 - 75.0 % Final    POCT Hematocrit Calculated, Venous 06/22/2024 43.0  36.0 - 46.0 % Final    POCT Sodium, Venous 06/22/2024 134 (L)  136 - 145 mmol/L Final    POCT Potassium, Venous 06/22/2024 4.9  3.5 - 5.3 mmol/L Final    POCT Chloride, Venous 06/22/2024 104  98 - 107 mmol/L Final    POCT Ionized Calicum, Venous 06/22/2024 1.18  1.10 - 1.33 mmol/L Final    POCT Glucose, Venous 06/22/2024 96  74 - 99 mg/dL Final    POCT Lactate, Venous 06/22/2024 1.0  0.4 - 2.0 mmol/L Final    POCT Base Excess, Venous 06/22/2024 5.8 (H)  -2.0 - 3.0 mmol/L Final    POCT HCO3 Calculated, Venous 06/22/2024 30.6 (H)  22.0 - 26.0 mmol/L Final    POCT Hemoglobin, Venous 06/22/2024 14.3  12.0 - 16.0 g/dL Final    POCT Anion Gap, Venous 06/22/2024 4.0 (L)  10.0 - 25.0 mmol/L Final    Patient Temperature 06/22/2024    Final    NOTE: Patient Results are Not Corrected for Temperature    FiO2 06/22/2024 21  % Final    WBC 06/23/2024 8.0  4.4 - 11.3 x10*3/uL Final    nRBC 06/23/2024 0.0  0.0 - 0.0 /100 WBCs Final    RBC 06/23/2024 4.58  4.00 - 5.20 x10*6/uL Final    Hemoglobin 06/23/2024 13.5  12.0 - 16.0 g/dL Final    Hematocrit 06/23/2024 43.1  36.0 - 46.0 % Final    MCV 06/23/2024 94  80 - 100 fL Final    MCH 06/23/2024 29.5  26.0 - 34.0 pg Final    MCHC 06/23/2024 31.3 (L)  32.0 - 36.0 g/dL Final    RDW 06/23/2024 13.4  11.5 - 14.5 % Final    Platelets 06/23/2024 183  150 - 450 x10*3/uL Final    Glucose 06/23/2024 64 (L)  74 - 99 mg/dL Final    Sodium 06/23/2024 139  136 - 145  mmol/L Final    Potassium 06/23/2024 3.9  3.5 - 5.3 mmol/L Final    Chloride 06/23/2024 105  98 - 107 mmol/L Final    Bicarbonate 06/23/2024 23  21 - 32 mmol/L Final    Anion Gap 06/23/2024 15  10 - 20 mmol/L Final    Urea Nitrogen 06/23/2024 14  6 - 23 mg/dL Final    Creatinine 06/23/2024 0.40 (L)  0.50 - 1.05 mg/dL Final    eGFR 06/23/2024 >90  >60 mL/min/1.73m*2 Final    Calculations of estimated GFR are performed using the 2021 CKD-EPI Study Refit equation without the race variable for the IDMS-Traceable creatinine methods.  https://jasn.asnjournals.org/content/early/2021/09/22/ASN.0418156905    Calcium 06/23/2024 9.0  8.6 - 10.3 mg/dL Final    POCT Glucose 06/23/2024 56 (L)  74 - 99 mg/dL Final         Imaging     XR chest 1 view  Narrative: Interpreted By:  Krystian Acuña,   STUDY:  XR CHEST 1 VIEW;  6/23/2024 8:25 am      INDICATION:  Signs/Symptoms:left sided aspiration surveilance.      COMPARISON:  CT chest with contrast 22 June 2024 portable chest earlier same day  22 June 2024      ACCESSION NUMBER(S):  [id number]      ORDERING CLINICIAN:  HOWARD HENDERSON      TECHNIQUE:  Single frontal view of the chest; Portable technique      FINDINGS:  Today's radiograph is with the patient is significantly rotated,  limiting exam and causing partially artifactual opacification of left  hemithorax      Right hemithorax remains clear      The cardiomediastinal silhouette is unchanged      Impression: Limited exam due to rotated patient. No definite interval  cardiopulmonary change      MACRO:  None      Signed by: Krystian Acuña 6/23/2024 8:58 AM  Dictation workstation:   [id number]       Patient Active Problem List   Diagnosis    Acute hypoxic respiratory failure (Multi)    Aspiration into airway         Assessment/Plan   Principal Problem:    Acute hypoxic respiratory failure (Multi)  Active Problems:    Aspiration into airway      Acute aspiration requiring oxygen possible chemical reaction if white count or fever  will add some antibiotic to cover bacterial aspiration  Pulmonary note was reviewed continue to monitor history of dementia poor prognosis patient is DNR arrest               NEERAJ Palomo MD

## 2024-06-24 LAB
ANION GAP SERPL CALC-SCNC: 16 MMOL/L (ref 10–20)
BUN SERPL-MCNC: 13 MG/DL (ref 6–23)
CALCIUM SERPL-MCNC: 8.5 MG/DL (ref 8.6–10.3)
CHLORIDE SERPL-SCNC: 106 MMOL/L (ref 98–107)
CO2 SERPL-SCNC: 22 MMOL/L (ref 21–32)
CREAT SERPL-MCNC: 0.47 MG/DL (ref 0.5–1.05)
EGFRCR SERPLBLD CKD-EPI 2021: >90 ML/MIN/1.73M*2
ERYTHROCYTE [DISTWIDTH] IN BLOOD BY AUTOMATED COUNT: 13.2 % (ref 11.5–14.5)
GLUCOSE BLD MANUAL STRIP-MCNC: 104 MG/DL (ref 74–99)
GLUCOSE BLD MANUAL STRIP-MCNC: 82 MG/DL (ref 74–99)
GLUCOSE BLD MANUAL STRIP-MCNC: 82 MG/DL (ref 74–99)
GLUCOSE SERPL-MCNC: 109 MG/DL (ref 74–99)
HCT VFR BLD AUTO: 41 % (ref 36–46)
HGB BLD-MCNC: 13 G/DL (ref 12–16)
HOLD SPECIMEN: NORMAL
MCH RBC QN AUTO: 29.5 PG (ref 26–34)
MCHC RBC AUTO-ENTMCNC: 31.7 G/DL (ref 32–36)
MCV RBC AUTO: 93 FL (ref 80–100)
NRBC BLD-RTO: 0 /100 WBCS (ref 0–0)
PLATELET # BLD AUTO: 149 X10*3/UL (ref 150–450)
POTASSIUM SERPL-SCNC: 3.5 MMOL/L (ref 3.5–5.3)
RBC # BLD AUTO: 4.4 X10*6/UL (ref 4–5.2)
SARS-COV-2 RNA RESP QL NAA+PROBE: NOT DETECTED
SODIUM SERPL-SCNC: 140 MMOL/L (ref 136–145)
WBC # BLD AUTO: 13.6 X10*3/UL (ref 4.4–11.3)

## 2024-06-24 PROCEDURE — 36415 COLL VENOUS BLD VENIPUNCTURE: CPT | Performed by: NURSE PRACTITIONER

## 2024-06-24 PROCEDURE — 2500000004 HC RX 250 GENERAL PHARMACY W/ HCPCS (ALT 636 FOR OP/ED): Performed by: INTERNAL MEDICINE

## 2024-06-24 PROCEDURE — 1200000002 HC GENERAL ROOM WITH TELEMETRY DAILY

## 2024-06-24 PROCEDURE — 80048 BASIC METABOLIC PNL TOTAL CA: CPT | Performed by: NURSE PRACTITIONER

## 2024-06-24 PROCEDURE — 2500000004 HC RX 250 GENERAL PHARMACY W/ HCPCS (ALT 636 FOR OP/ED): Performed by: NURSE PRACTITIONER

## 2024-06-24 PROCEDURE — 2500000002 HC RX 250 W HCPCS SELF ADMINISTERED DRUGS (ALT 637 FOR MEDICARE OP, ALT 636 FOR OP/ED): Performed by: NURSE PRACTITIONER

## 2024-06-24 PROCEDURE — 2500000001 HC RX 250 WO HCPCS SELF ADMINISTERED DRUGS (ALT 637 FOR MEDICARE OP): Performed by: NURSE PRACTITIONER

## 2024-06-24 PROCEDURE — 82947 ASSAY GLUCOSE BLOOD QUANT: CPT

## 2024-06-24 PROCEDURE — 85027 COMPLETE CBC AUTOMATED: CPT | Performed by: NURSE PRACTITIONER

## 2024-06-24 PROCEDURE — 2500000005 HC RX 250 GENERAL PHARMACY W/O HCPCS: Performed by: NURSE PRACTITIONER

## 2024-06-24 PROCEDURE — 82947 ASSAY GLUCOSE BLOOD QUANT: CPT | Mod: 91

## 2024-06-24 PROCEDURE — 87086 URINE CULTURE/COLONY COUNT: CPT | Mod: STJLAB | Performed by: NURSE PRACTITIONER

## 2024-06-24 PROCEDURE — 94640 AIRWAY INHALATION TREATMENT: CPT

## 2024-06-24 PROCEDURE — 87635 SARS-COV-2 COVID-19 AMP PRB: CPT | Performed by: NURSE PRACTITIONER

## 2024-06-24 RX ORDER — ALBUTEROL SULFATE 0.83 MG/ML
2.5 SOLUTION RESPIRATORY (INHALATION) EVERY 4 HOURS PRN
Status: DISCONTINUED | OUTPATIENT
Start: 2024-06-24 | End: 2024-06-27 | Stop reason: HOSPADM

## 2024-06-24 RX ORDER — ALBUTEROL SULFATE 0.83 MG/ML
2.5 SOLUTION RESPIRATORY (INHALATION) 3 TIMES DAILY
Status: DISCONTINUED | OUTPATIENT
Start: 2024-06-24 | End: 2024-06-27

## 2024-06-24 RX ORDER — SODIUM CHLORIDE, SODIUM LACTATE, POTASSIUM CHLORIDE, CALCIUM CHLORIDE 600; 310; 30; 20 MG/100ML; MG/100ML; MG/100ML; MG/100ML
50 INJECTION, SOLUTION INTRAVENOUS CONTINUOUS
Status: DISCONTINUED | OUTPATIENT
Start: 2024-06-24 | End: 2024-06-27 | Stop reason: HOSPADM

## 2024-06-24 RX ORDER — POTASSIUM CHLORIDE 14.9 MG/ML
20 INJECTION INTRAVENOUS ONCE
Status: COMPLETED | OUTPATIENT
Start: 2024-06-24 | End: 2024-06-24

## 2024-06-24 RX ADMIN — Medication 10 ML: at 22:01

## 2024-06-24 RX ADMIN — AMPICILLIN SODIUM AND SULBACTAM SODIUM 3 G: 2; 1 INJECTION, POWDER, FOR SOLUTION INTRAMUSCULAR; INTRAVENOUS at 11:53

## 2024-06-24 RX ADMIN — Medication 1 SPRAY: at 05:50

## 2024-06-24 RX ADMIN — ALBUTEROL SULFATE 2.5 MG: 2.5 SOLUTION RESPIRATORY (INHALATION) at 19:53

## 2024-06-24 RX ADMIN — Medication 1 SPRAY: at 17:11

## 2024-06-24 RX ADMIN — AMPICILLIN SODIUM AND SULBACTAM SODIUM 3 G: 2; 1 INJECTION, POWDER, FOR SOLUTION INTRAMUSCULAR; INTRAVENOUS at 05:49

## 2024-06-24 RX ADMIN — AMPICILLIN SODIUM AND SULBACTAM SODIUM 3 G: 2; 1 INJECTION, POWDER, FOR SOLUTION INTRAMUSCULAR; INTRAVENOUS at 23:00

## 2024-06-24 RX ADMIN — ACETAMINOPHEN 650 MG: 650 SUPPOSITORY RECTAL at 12:17

## 2024-06-24 RX ADMIN — Medication 4 L/MIN: at 05:00

## 2024-06-24 RX ADMIN — SODIUM CHLORIDE, POTASSIUM CHLORIDE, SODIUM LACTATE AND CALCIUM CHLORIDE 50 ML/HR: 600; 310; 30; 20 INJECTION, SOLUTION INTRAVENOUS at 11:54

## 2024-06-24 RX ADMIN — AMPICILLIN SODIUM AND SULBACTAM SODIUM 3 G: 2; 1 INJECTION, POWDER, FOR SOLUTION INTRAMUSCULAR; INTRAVENOUS at 17:11

## 2024-06-24 RX ADMIN — Medication 10 ML: at 05:50

## 2024-06-24 RX ADMIN — CHLORHEXIDINE GLUCONATE 0.12% ORAL RINSE 15 ML: 1.2 LIQUID ORAL at 21:54

## 2024-06-24 RX ADMIN — CHLORHEXIDINE GLUCONATE 0.12% ORAL RINSE 15 ML: 1.2 LIQUID ORAL at 08:06

## 2024-06-24 RX ADMIN — POTASSIUM CHLORIDE 20 MEQ: 14.9 INJECTION, SOLUTION INTRAVENOUS at 11:53

## 2024-06-24 ASSESSMENT — PAIN SCALES - WONG BAKER
WONGBAKER_NUMERICALRESPONSE: NO HURT
WONGBAKER_NUMERICALRESPONSE: NO HURT
WONGBAKER_NUMERICALRESPONSE: HURTS WHOLE LOT

## 2024-06-24 ASSESSMENT — PAIN - FUNCTIONAL ASSESSMENT
PAIN_FUNCTIONAL_ASSESSMENT: 0-10
PAIN_FUNCTIONAL_ASSESSMENT: FLACC (FACE, LEGS, ACTIVITY, CRY, CONSOLABILITY)

## 2024-06-24 ASSESSMENT — COGNITIVE AND FUNCTIONAL STATUS - GENERAL
MOVING FROM LYING ON BACK TO SITTING ON SIDE OF FLAT BED WITH BEDRAILS: TOTAL
DRESSING REGULAR UPPER BODY CLOTHING: TOTAL
MOVING TO AND FROM BED TO CHAIR: TOTAL
CLIMB 3 TO 5 STEPS WITH RAILING: TOTAL
TOILETING: TOTAL
TURNING FROM BACK TO SIDE WHILE IN FLAT BAD: TOTAL
DRESSING REGULAR LOWER BODY CLOTHING: TOTAL
EATING MEALS: TOTAL
STANDING UP FROM CHAIR USING ARMS: TOTAL
HELP NEEDED FOR BATHING: TOTAL
MOBILITY SCORE: 6
PERSONAL GROOMING: TOTAL
WALKING IN HOSPITAL ROOM: TOTAL
DAILY ACTIVITIY SCORE: 6

## 2024-06-24 ASSESSMENT — PAIN SCALES - GENERAL: PAINLEVEL_OUTOF10: 0 - NO PAIN

## 2024-06-24 ASSESSMENT — PAIN DESCRIPTION - LOCATION: LOCATION: OTHER (COMMENT)

## 2024-06-24 NOTE — NURSING NOTE
0600 - Oxygen decreased to 4L via oximizer and currently sating at 96%.  Pt. Is comfortable and at resting with no respiratory distress and even and unlabored breathing.    EOS note:  Pt. Seemingly more alert this shift compared to previous day.   Pt. Slightly more restless and fidgety with repositioning.   IVF continued at 50ml/hr.  Unasyn continued per order.  IV flushed and patent.   Strict NPO maintained.   Frequent oral care provided with rounding.  At beginning of shift, Pt. Increased O2 needs from 6l NC to 10l oximizer.  Intermittently weaned O2 during later hours of this shift and tolerated well while pt. Is  calm and at rest.  With exertion, pt. Desats.  Continuous pulse ox Probe adjusted q2h.   Blanchable redness at bilateral buttocks.  HOB remained greater than 30degree.

## 2024-06-24 NOTE — NURSING NOTE
Patient with unchanged neuro status and still Alert and oriented to 0. Patient still remains NPO at this time. Accu-check orders completed. Patient turned Q2 hours, Oxygen weaned down to 3L NC. Bed alarm remains on and IVF running.

## 2024-06-24 NOTE — PROGRESS NOTES
"Nutrition Initial Assessment:   Nutrition Assessment         Patient History: Snehal Souza is a 74 y.o. female presenting to ED from [facility] after witnessed aspiration on 6/22.  Pulmonology consulted for possible bronchoscopy.  Palliative care consulted for goals of care.  Pt has not been able to pass a swallow eval yet from  due to lethargy.    Past Medical History: Lewy body dementia with minimally verbal baseline, remote breast cancer status post left partial mastectomy, coronary artery disease, anxiety on chronic prescription benzodiazepine, seizure disorder, remote COVID in January 2022, and cachexia     Nutrition History:  Energy Intake:  (NPO)  Food and Nutrient History: RD called and spoke to nursing from [facility] who noted that patient had been on a [place] soft diet with thin liquids.  No current nutrition supplements were ordered as patient had been maintaining weight in the # range.  She typically eats small portions and does not say a lot but is able to answer questions.  Food Allergies/Intolerances:  None  GI Symptoms: None  Oral Problems: Chewing difficulty and Swallowing difficulty       Current Diet: NPO Diet; Effective now    Anthropometrics:  Height: 165.1 cm (5' 5\")   Weight: (!) 38.6 kg (85 lb 1.6 oz)   BMI (Calculated): 14.16             Weight Change %:  Weight History / % Weight Change: Per SNF weights, November 2023: 102#, December: 97#,  Weight had been maintaining in the upper 90s per archives.  Significant Weight Loss: Yes ((-13.3% in the past 3 months))  Interpretation of Weight Loss: >7.5% in 3 months    Pain Assessment: 0-10  Pain Type: Acute pain  Pain Location: Other (Comment)      Nutrition Significant Labs:  BMP Trend:   Results from last 7 days   Lab Units 06/24/24  0647 06/23/24  0542 06/22/24  1742   GLUCOSE mg/dL 109* 64* 91   CALCIUM mg/dL 8.5* 9.0 9.2   SODIUM mmol/L 140 139 137   POTASSIUM mmol/L 3.5 3.9 4.5   CO2 mmol/L 22 23 25   CHLORIDE mmol/L 106 105 " 102   BUN mg/dL 13 14 17   CREATININE mg/dL 0.47* 0.40* 0.43*        Nutrition Specific Medications:  Scheduled medications  albuterol, 2.5 mg, nebulization, TID  ampicillin-sulbactam, 3 g, intravenous, q6h  [Held by provider] carbidopa-levodopa, 1 tablet, oral, TID  chlorhexidine, 15 mL, Mouth/Throat, BID  diazePAM, 2 mg, intravenous, BID  [Held by provider] docusate sodium, 100 mg, oral, BID  fluticasone, 2 spray, Each Nostril, Daily  [Held by provider] memantine, 10 mg, oral, BID  sodium chloride 0.9%, 10 mL, intravenous, q8h ANGIE      Continuous medications  lactated Ringer's, 50 mL/hr, Last Rate: 50 mL/hr (06/24/24 1536)        Nutrition Focused Physical Exam Findings:  Subcutaneous Fat Loss:   Orbital Fat Pads: Mild-Moderate (slight dark circles and slight hollowing)  Buccal Fat Pads: Mild-Moderate (flat cheeks, minimal bounce)  Triceps: Defer  Ribs: Defer  Muscle Wasting:  Temporalis: Mild-Moderate (slight depression)  Pectoralis (Clavicular Region): Mild-Moderate (some protrusion of clavicle)  Deltoid/Trapezius: Mild-Moderate (slight protrusion of acromion process)  Interosseous: Defer  Trapezius/Infraspinatus/Supraspinatus (Scapular Region): Defer  Quadriceps: Defer  Gastrocnemius: Defer  Edema:  Edema: none  Physical Findings:  Hair: Negative  Eyes: Negative  Mouth: Negative  Nails: Negative  Skin: Negative     Estimated Needs:   Total Energy Estimated Needs (kCal):  (1200-1350kcal or 30-35kcal/kg)  Total Protein Estimated Needs (g):  (46-58g or 1.2-1.5g/kg)  Total Fluid Estimated Needs (mL):  (1ml/kcal or per MD recs)          Nutrition Diagnosis   Malnutrition Diagnosis  Patient has Malnutrition Diagnosis: Yes  Diagnosis Status: New  Malnutrition Diagnosis: Moderate malnutrition related to chronic disease or condition  As Evidenced by: acute on chronic nutritional stress with aspiration episode and chronic stress of lewy body dementia with indicators including PO intakes meeting <75% of needs for > 1  month, weight record indicating loss >7.5% within the past 3 months, and NFPE showing signs of moderate muscle wasting/fat loss.            Nutrition Interventions/Recommendations         Nutrition Prescription:  Individualized Nutrition Prescription Provided for : Continue NPO status and advance per to diet texture per ST findings from repeat eval        Nutrition Interventions:   Food and/or Nutrient Delivery Interventions  Interventions: Enteral intake, Medical food supplement  Enteral Intake: Other (Comment)  Goal: if indicated per plan of care would suggest corpak placement with the following EN regimen: Jevity 1.5 at goal rate of 35ml/hr (initiate at 10ml/hr and advance by 10ml/hr every 6-8hrs with tolerance to goal); goal rate would provide 1260kcal, 57g pro, and 650ml free water; Suggest free water flush of 105ml 6x/day to provide total fluid volume of 1280ml free water  Medical Food Supplement: Commercial beverage  Goal: If able to start PO diet would suggest 4oz Boost VHC TID with meals (4oz provides 265kcal, 11g pro)    Coordination of Nutrition Care by a Nutrition Professional  Collaboration and Referral of Nutrition Care: Collaboration by nutrition professional with other providers  Goal: Maintain communication with IDT as appropriate       Nutrition Monitoring and Evaluation   Food/Nutrient Related History Monitoring  Monitoring and Evaluation Plan: Enteral and parenteral nutrition intake  Enteral and Parenteral Nutrition Intake: Enteral nutrition intake  Criteria: Pt will tolerate goal rate within 24-48hrs if EN support warranted as part of plan of care         Biochemical Data, Medical Tests and Procedures  Monitoring and Evaluation Plan: Electrolyte/renal panel  Electrolyte and Renal Panel: Phosphorus, Magnesium, Chloride, Sodium, BUN  Criteria: Maintain within acceptable range            Time Spent/Follow-up Reminder:   Time Spent (min): 60 minutes  Last Date of Nutrition Visit: 06/24/24  Nutrition  Follow-Up Needed?: 3-5 days  Follow up Comment: DOUG pearce in - NPO pending ST deniseal

## 2024-06-24 NOTE — CONSULTS
Reason For Consult  Goals of care    History Of Present Illness  Snehal Souza is a 74 y.o. female with medical history significant for Lewy body dementia with minimally verbal baseline, remote breast cancer status post left partial mastectomy, coronary artery disease, anxiety on chronic prescription benzodiazepine, seizure disorder, remote COVID in January 2022, and cachexia with BMI of 14 presenting to [facility] on 6/22/2024 with complaint of witnessed aspiration.      Past Medical History  She has a past medical history of Ambulatory dysfunction, Anxiety and depression, Cachexia (Multi), Chronic prescription benzodiazepine use, Coronary artery disease, Dysphagia, oropharyngeal, History of cancer of left breast, History of closed head injury, History of COVID-19 (01/2022), History of hip fracture, Hyperlipidemia, Irritable bowel syndrome with diarrhea, Lewy body dementia (Multi), Osteopenia, Osteoporosis, Parkinson's disease (Multi), Pulmonary nodule, Recurrent falls, Seizure disorder (Multi), Underweight, Vitamin D deficiency, and Wheelchair dependence.         Assessment/Plan     Palliative consulted for goals of care. Pt with dementia and unable to participate in goals of care discussion. HPOA on the chart from 2022 list daughter sara as first contact. Phone call placed with no answer. Spoke with second contact Humaira, pts daughter over the phone at length about pts condition, admission to [facility] and goals of care. Humaira states that Sara lives out of town and is not primarily involved in pts care. Humaira and this writer discussed pts current condition, humaira is unclear if this is pts baseline as she usually only sees her around lunch time during the day. She is wondering if the pt only sleeps all day anyway and she is just unaware. She has not had a care conference with [facility] in sometime. We discussed pts issues with swallowing. Humaiar would like to see if pt can participate more in the swallow  evaluation today after the IV abx. Humaira was here yesterday when the SLP attempted the evaluation and the pt was not awake enough to participate.     We also discussed pts disease process and that this could be progression of the dementia and it may be time to consider hospice care if the goals for the pt surround comfort. Humaira understands this and states she had a feeling we would talk about this. She does feel that the pt is suffering and does not want to see her suffer any further. Humaira would like to talk with her sister about hospice care. Offered to arrange hospice meeting however Humaira would like to see how today goes, and talk with her sister before scheduling a meeting.     Will continue to follow and continue goals of care discussion tomorrow with pts daughter.           Shanika Neal RN

## 2024-06-24 NOTE — PROGRESS NOTES
"Subjective  Patient had uneventful night does not complain about any chest pain s continue to be minimally responsive  Nursing staff was interviewed  Objectives    Last Recorded Vitals  Blood pressure 114/64, pulse 95, temperature 37 °C (98.6 °F), temperature source Temporal, resp. rate 17, height 1.651 m (5' 5\"), weight (!) 38.6 kg (85 lb 1.6 oz), SpO2 97%.    Physical Exam  HENT:      Right Ear: External ear normal.      Left Ear: External ear normal.      Mouth/Throat:      Mouth: Mucous membranes are moist.   Cardiovascular:      Rate and Rhythm: Normal rate and regular rhythm.      Heart sounds: No murmur heard.     No friction rub. No gallop.   Pulmonary:      Effort: No accessory muscle usage or respiratory distress.      Breath sounds: No stridor. No wheezing or rhonchi.   Chest:      Chest wall: No tenderness.   Abdominal:      General: There is no distension.      Palpations: There is no mass.      Tenderness: There is no abdominal tenderness. There is no guarding or rebound.   Musculoskeletal:         General: No deformity or signs of injury.      Cervical back: No rigidity or tenderness. Normal range of motion.      Right lower leg: No edema.      Left lower leg: No edema.   Skin:     Coloration: Skin is not jaundiced or pale.      Findings: No lesion.   Neurological:   Lethargic confused       Labs    Admission on 06/22/2024   Component Date Value Ref Range Status    WBC 06/22/2024 5.9  4.4 - 11.3 x10*3/uL Final    nRBC 06/22/2024 0.0  0.0 - 0.0 /100 WBCs Final    RBC 06/22/2024 4.60  4.00 - 5.20 x10*6/uL Final    Hemoglobin 06/22/2024 13.7  12.0 - 16.0 g/dL Final    Hematocrit 06/22/2024 42.0  36.0 - 46.0 % Final    MCV 06/22/2024 91  80 - 100 fL Final    MCH 06/22/2024 29.8  26.0 - 34.0 pg Final    MCHC 06/22/2024 32.6  32.0 - 36.0 g/dL Final    RDW 06/22/2024 13.3  11.5 - 14.5 % Final    Platelets 06/22/2024 191  150 - 450 x10*3/uL Final    Neutrophils % 06/22/2024 72.8  40.0 - 80.0 % Final    " Immature Granulocytes %, Automated 06/22/2024 0.2  0.0 - 0.9 % Final    Immature Granulocyte Count (IG) includes promyelocytes, myelocytes and metamyelocytes but does not include bands. Percent differential counts (%) should be interpreted in the context of the absolute cell counts (cells/UL).    Lymphocytes % 06/22/2024 17.7  13.0 - 44.0 % Final    Monocytes % 06/22/2024 7.1  2.0 - 10.0 % Final    Eosinophils % 06/22/2024 1.5  0.0 - 6.0 % Final    Basophils % 06/22/2024 0.7  0.0 - 2.0 % Final    Neutrophils Absolute 06/22/2024 4.33  1.60 - 5.50 x10*3/uL Final    Percent differential counts (%) should be interpreted in the context of the absolute cell counts (cells/uL).    Immature Granulocytes Absolute, Au* 06/22/2024 0.01  0.00 - 0.50 x10*3/uL Final    Lymphocytes Absolute 06/22/2024 1.05  0.80 - 3.00 x10*3/uL Final    Monocytes Absolute 06/22/2024 0.42  0.05 - 0.80 x10*3/uL Final    Eosinophils Absolute 06/22/2024 0.09  0.00 - 0.40 x10*3/uL Final    Basophils Absolute 06/22/2024 0.04  0.00 - 0.10 x10*3/uL Final    Glucose 06/22/2024 91  74 - 99 mg/dL Final    Sodium 06/22/2024 137  136 - 145 mmol/L Final    Potassium 06/22/2024 4.5  3.5 - 5.3 mmol/L Final    MILD HEMOLYSIS DETECTED. The result may be falsely elevated due to hemolysis or other interferents. Clinical correlation is recommended. Repeat testing may be considered.    Chloride 06/22/2024 102  98 - 107 mmol/L Final    Bicarbonate 06/22/2024 25  21 - 32 mmol/L Final    Anion Gap 06/22/2024 15  10 - 20 mmol/L Final    Urea Nitrogen 06/22/2024 17  6 - 23 mg/dL Final    Creatinine 06/22/2024 0.43 (L)  0.50 - 1.05 mg/dL Final    eGFR 06/22/2024 >90  >60 mL/min/1.73m*2 Final    Calculations of estimated GFR are performed using the 2021 CKD-EPI Study Refit equation without the race variable for the IDMS-Traceable creatinine methods.  https://jasn.asnjournals.org/content/early/2021/09/22/ASN.3499748810    Calcium 06/22/2024 9.2  8.6 - 10.3 mg/dL Final     Albumin 06/22/2024 4.0  3.4 - 5.0 g/dL Final    Alkaline Phosphatase 06/22/2024 65  33 - 136 U/L Final    Total Protein 06/22/2024 6.8  6.4 - 8.2 g/dL Final    AST 06/22/2024 18  9 - 39 U/L Final    MILD HEMOLYSIS DETECTED. The result may be falsely elevated due to hemolysis or other interferents. Clinical correlation is recommended. Repeat testing may be considered.    Bilirubin, Total 06/22/2024 0.5  0.0 - 1.2 mg/dL Final    ALT 06/22/2024 <3 (L)  7 - 45 U/L Final    Patients treated with Sulfasalazine may generate falsely decreased results for ALT.    Lactate 06/22/2024 0.7  0.4 - 2.0 mmol/L Final    Blood Culture 06/22/2024 No growth at 1 day   Preliminary    Blood Culture 06/22/2024 No growth at 1 day   Preliminary    POCT pH, Venous 06/22/2024 7.45 (H)  7.33 - 7.43 pH Final    POCT pCO2, Venous 06/22/2024 44  41 - 51 mm Hg Final    POCT pO2, Venous 06/22/2024 46 (H)  35 - 45 mm Hg Final    POCT SO2, Venous 06/22/2024 70  45 - 75 % Final    POCT Oxy Hemoglobin, Venous 06/22/2024 69.3  45.0 - 75.0 % Final    POCT Hematocrit Calculated, Venous 06/22/2024 43.0  36.0 - 46.0 % Final    POCT Sodium, Venous 06/22/2024 134 (L)  136 - 145 mmol/L Final    POCT Potassium, Venous 06/22/2024 4.9  3.5 - 5.3 mmol/L Final    POCT Chloride, Venous 06/22/2024 104  98 - 107 mmol/L Final    POCT Ionized Calicum, Venous 06/22/2024 1.18  1.10 - 1.33 mmol/L Final    POCT Glucose, Venous 06/22/2024 96  74 - 99 mg/dL Final    POCT Lactate, Venous 06/22/2024 1.0  0.4 - 2.0 mmol/L Final    POCT Base Excess, Venous 06/22/2024 5.8 (H)  -2.0 - 3.0 mmol/L Final    POCT HCO3 Calculated, Venous 06/22/2024 30.6 (H)  22.0 - 26.0 mmol/L Final    POCT Hemoglobin, Venous 06/22/2024 14.3  12.0 - 16.0 g/dL Final    POCT Anion Gap, Venous 06/22/2024 4.0 (L)  10.0 - 25.0 mmol/L Final    Patient Temperature 06/22/2024    Final    NOTE: Patient Results are Not Corrected for Temperature    FiO2 06/22/2024 21  % Final    Color, Urine 06/23/2024 Yellow   Light-Yellow, Yellow, Dark-Yellow Final    Appearance, Urine 06/23/2024 Clear  Clear Final    Specific Gravity, Urine 06/23/2024 1.042 (N)  1.005 - 1.035 Final    pH, Urine 06/23/2024 5.5  5.0, 5.5, 6.0, 6.5, 7.0, 7.5, 8.0 Final    Protein, Urine 06/23/2024 20 (TRACE)  NEGATIVE, 10 (TRACE), 20 (TRACE) mg/dL Final    Glucose, Urine 06/23/2024 500 (3+) (A)  Normal mg/dL Final    Blood, Urine 06/23/2024 NEGATIVE  NEGATIVE Final    Ketones, Urine 06/23/2024 40 (2+) (A)  NEGATIVE mg/dL Final    Bilirubin, Urine 06/23/2024 NEGATIVE  NEGATIVE Final    Urobilinogen, Urine 06/23/2024 Normal  Normal mg/dL Final    Nitrite, Urine 06/23/2024 2+ (A)  NEGATIVE Final    Leukocyte Esterase, Urine 06/23/2024 500 Maikel/µL (A)  NEGATIVE Final    WBC 06/23/2024 8.0  4.4 - 11.3 x10*3/uL Final    nRBC 06/23/2024 0.0  0.0 - 0.0 /100 WBCs Final    RBC 06/23/2024 4.58  4.00 - 5.20 x10*6/uL Final    Hemoglobin 06/23/2024 13.5  12.0 - 16.0 g/dL Final    Hematocrit 06/23/2024 43.1  36.0 - 46.0 % Final    MCV 06/23/2024 94  80 - 100 fL Final    MCH 06/23/2024 29.5  26.0 - 34.0 pg Final    MCHC 06/23/2024 31.3 (L)  32.0 - 36.0 g/dL Final    RDW 06/23/2024 13.4  11.5 - 14.5 % Final    Platelets 06/23/2024 183  150 - 450 x10*3/uL Final    Glucose 06/23/2024 64 (L)  74 - 99 mg/dL Final    Sodium 06/23/2024 139  136 - 145 mmol/L Final    Potassium 06/23/2024 3.9  3.5 - 5.3 mmol/L Final    Chloride 06/23/2024 105  98 - 107 mmol/L Final    Bicarbonate 06/23/2024 23  21 - 32 mmol/L Final    Anion Gap 06/23/2024 15  10 - 20 mmol/L Final    Urea Nitrogen 06/23/2024 14  6 - 23 mg/dL Final    Creatinine 06/23/2024 0.40 (L)  0.50 - 1.05 mg/dL Final    eGFR 06/23/2024 >90  >60 mL/min/1.73m*2 Final    Calculations of estimated GFR are performed using the 2021 CKD-EPI Study Refit equation without the race variable for the IDMS-Traceable creatinine methods.  https://jasn.asnjournals.org/content/early/2021/09/22/ASN.8965685701    Calcium 06/23/2024 9.0  8.6 - 10.3  mg/dL Final    POCT Glucose 06/23/2024 56 (L)  74 - 99 mg/dL Final    POCT Glucose 06/23/2024 163 (H)  74 - 99 mg/dL Final    Extra Tube 06/24/2024 Hold for add-ons.   Final    Auto resulted.    WBC, Urine 06/23/2024 11-20 (A)  1-5, NONE /HPF Final    RBC, Urine 06/23/2024 1-2  NONE, 1-2, 3-5 /HPF Final    Squamous Epithelial Cells, Urine 06/23/2024 1-9 (SPARSE)  Reference range not established. /HPF Final    Bacteria, Urine 06/23/2024 4+ (A)  NONE SEEN /HPF Final    Mucus, Urine 06/23/2024 FEW  Reference range not established. /LPF Final    Calcium Oxalate Crystals, Urine 06/23/2024 1+  NONE, 1+ /HPF Final    WBC 06/24/2024 13.6 (H)  4.4 - 11.3 x10*3/uL Final    nRBC 06/24/2024 0.0  0.0 - 0.0 /100 WBCs Final    RBC 06/24/2024 4.40  4.00 - 5.20 x10*6/uL Final    Hemoglobin 06/24/2024 13.0  12.0 - 16.0 g/dL Final    Hematocrit 06/24/2024 41.0  36.0 - 46.0 % Final    MCV 06/24/2024 93  80 - 100 fL Final    MCH 06/24/2024 29.5  26.0 - 34.0 pg Final    MCHC 06/24/2024 31.7 (L)  32.0 - 36.0 g/dL Final    RDW 06/24/2024 13.2  11.5 - 14.5 % Final    Platelets 06/24/2024 149 (L)  150 - 450 x10*3/uL Final    Glucose 06/24/2024 109 (H)  74 - 99 mg/dL Final    Sodium 06/24/2024 140  136 - 145 mmol/L Final    Potassium 06/24/2024 3.5  3.5 - 5.3 mmol/L Final    Chloride 06/24/2024 106  98 - 107 mmol/L Final    Bicarbonate 06/24/2024 22  21 - 32 mmol/L Final    Anion Gap 06/24/2024 16  10 - 20 mmol/L Final    Urea Nitrogen 06/24/2024 13  6 - 23 mg/dL Final    Creatinine 06/24/2024 0.47 (L)  0.50 - 1.05 mg/dL Final    eGFR 06/24/2024 >90  >60 mL/min/1.73m*2 Final    Calculations of estimated GFR are performed using the 2021 CKD-EPI Study Refit equation without the race variable for the IDMS-Traceable creatinine methods.  https://jasn.asnjournals.org/content/early/2021/09/22/ASN.7654882516    Calcium 06/24/2024 8.5 (L)  8.6 - 10.3 mg/dL Final    POCT Glucose 06/24/2024 104 (H)  74 - 99 mg/dL Final         Imaging     XR chest 1  view  Narrative: Interpreted By:  Krystian Acuña,   STUDY:  XR CHEST 1 VIEW;  6/23/2024 8:25 am      INDICATION:  Signs/Symptoms:left sided aspiration surveilance.      COMPARISON:  CT chest with contrast 22 June 2024 portable chest earlier same day  22 June 2024      ACCESSION NUMBER(S):  [id number]      ORDERING CLINICIAN:  HOWARD HENDERSON      TECHNIQUE:  Single frontal view of the chest; Portable technique      FINDINGS:  Today's radiograph is with the patient is significantly rotated,  limiting exam and causing partially artifactual opacification of left  hemithorax      Right hemithorax remains clear      The cardiomediastinal silhouette is unchanged      Impression: Limited exam due to rotated patient. No definite interval  cardiopulmonary change      MACRO:  None      Signed by: Krystian Acuña 6/23/2024 8:58 AM  Dictation workstation:   [id number]       Patient Active Problem List   Diagnosis    Acute hypoxic respiratory failure (Multi)    Aspiration into airway         Assessment/Plan   Principal Problem:    Acute hypoxic respiratory failure (Multi)  Active Problems:    Aspiration into airway      Aspiration pneumonia patient started on Unasyn IV continue to wean off oxygen patient is DNR         NEERAJ Palomo MD

## 2024-06-24 NOTE — PROGRESS NOTES
06/24/24 0657   Discharge Planning   Living Arrangements Other (Comment)   Support Systems Children   Type of Residence Nursing home/residential care   Type of Post Acute Facility Services Long term care   Patient expects to be discharged to: Return to the [facility]   Does the patient need discharge transport arranged? Yes   RoundTrip coordination needed? Yes   Has discharge transport been arranged? No   Patient Choice   Patient / Family choosing to utilize agency / facility established prior to hospitalization Yes     Spoke with the pt's dtr, Humaira, by phone. She confirmed that the pt is a long term resident at the [facility] and will return there when ready for d/c. Asked the [facility] to send a referral.

## 2024-06-24 NOTE — CARE PLAN
The patient's goals for the shift include      The clinical goals for the shift include See POC    Problem: Nutrition  Goal: Oral intake greater than 50%  Outcome: Not Progressing  Goal: Oral intake greater 75%  Outcome: Not Progressing  Goal: Consume prescribed supplement  Outcome: Not Progressing  Goal: Adequate PO fluid intake  Outcome: Not Progressing  Goal: Nutrition support goals are met within 48 hrs  Outcome: Not Progressing  Goal: Nutrition support is meeting 75% of nutrient needs  Outcome: Not Progressing  Goal: Tube feed tolerance  Outcome: Not Progressing  Goal: Maintain stable weight  Outcome: Not Progressing  Goal: Reduce weight from edema/fluid  Outcome: Not Progressing  Goal: Gradual weight gain  Outcome: Not Progressing  Goal: Improve ostomy output  Outcome: Not Progressing       Problem: Pain - Adult  Goal: Verbalizes/displays adequate comfort level or baseline comfort level  Outcome: Progressing     Problem: Safety - Adult  Goal: Free from fall injury  Outcome: Progressing     Problem: Discharge Planning  Goal: Discharge to home or other facility with appropriate resources  Outcome: Progressing     Problem: Chronic Conditions and Co-morbidities  Goal: Patient's chronic conditions and co-morbidity symptoms are monitored and maintained or improved  Outcome: Progressing     Problem: Skin  Goal: Decreased wound size/increased tissue granulation at next dressing change  Outcome: Progressing  Goal: Participates in plan/prevention/treatment measures  Outcome: Progressing  Goal: Prevent/manage excess moisture  Outcome: Progressing  Goal: Prevent/minimize sheer/friction injuries  Outcome: Progressing  Goal: Promote/optimize nutrition  Outcome: Progressing  Goal: Promote skin healing  Outcome: Progressing     Problem: Nutrition  Goal: Less than 5 days NPO/clear liquids  Outcome: Progressing  Goal: Oral intake greater than 50%  Outcome: Not Progressing  Goal: Oral intake greater 75%  Outcome: Not  Progressing  Goal: Consume prescribed supplement  Outcome: Not Progressing  Goal: Adequate PO fluid intake  Outcome: Not Progressing  Goal: Nutrition support goals are met within 48 hrs  Outcome: Not Progressing  Goal: Nutrition support is meeting 75% of nutrient needs  Outcome: Not Progressing  Goal: Tube feed tolerance  Outcome: Not Progressing  Goal: BG  mg/dL  Outcome: Progressing  Goal: Lab values WNL  Outcome: Progressing  Goal: Electrolytes WNL  Outcome: Progressing  Goal: Promote healing  Outcome: Progressing  Goal: Maintain stable weight  Outcome: Not Progressing  Goal: Reduce weight from edema/fluid  Outcome: Not Progressing  Goal: Gradual weight gain  Outcome: Not Progressing  Goal: Improve ostomy output  Outcome: Not Progressing

## 2024-06-25 LAB
ANION GAP SERPL CALC-SCNC: 14 MMOL/L (ref 10–20)
ATRIAL RATE: 84 BPM
BUN SERPL-MCNC: 11 MG/DL (ref 6–23)
CALCIUM SERPL-MCNC: 8.4 MG/DL (ref 8.6–10.3)
CHLORIDE SERPL-SCNC: 107 MMOL/L (ref 98–107)
CO2 SERPL-SCNC: 26 MMOL/L (ref 21–32)
CREAT SERPL-MCNC: 0.38 MG/DL (ref 0.5–1.05)
EGFRCR SERPLBLD CKD-EPI 2021: >90 ML/MIN/1.73M*2
ERYTHROCYTE [DISTWIDTH] IN BLOOD BY AUTOMATED COUNT: 13.5 % (ref 11.5–14.5)
GLUCOSE BLD MANUAL STRIP-MCNC: 100 MG/DL (ref 74–99)
GLUCOSE BLD MANUAL STRIP-MCNC: 62 MG/DL (ref 74–99)
GLUCOSE BLD MANUAL STRIP-MCNC: 75 MG/DL (ref 74–99)
GLUCOSE BLD MANUAL STRIP-MCNC: 85 MG/DL (ref 74–99)
GLUCOSE BLD MANUAL STRIP-MCNC: 89 MG/DL (ref 74–99)
GLUCOSE BLD MANUAL STRIP-MCNC: 92 MG/DL (ref 74–99)
GLUCOSE SERPL-MCNC: 79 MG/DL (ref 74–99)
HCT VFR BLD AUTO: 38.1 % (ref 36–46)
HGB BLD-MCNC: 12.4 G/DL (ref 12–16)
MCH RBC QN AUTO: 29.8 PG (ref 26–34)
MCHC RBC AUTO-ENTMCNC: 32.5 G/DL (ref 32–36)
MCV RBC AUTO: 92 FL (ref 80–100)
NRBC BLD-RTO: 0 /100 WBCS (ref 0–0)
P AXIS: 85 DEGREES
P OFFSET: 179 MS
P ONSET: 134 MS
PLATELET # BLD AUTO: 148 X10*3/UL (ref 150–450)
POTASSIUM SERPL-SCNC: 3.9 MMOL/L (ref 3.5–5.3)
PR INTERVAL: 172 MS
Q ONSET: 220 MS
QRS COUNT: 14 BEATS
QRS DURATION: 78 MS
QT INTERVAL: 418 MS
QTC CALCULATION(BAZETT): 493 MS
QTC FREDERICIA: 467 MS
R AXIS: 54 DEGREES
RBC # BLD AUTO: 4.16 X10*6/UL (ref 4–5.2)
SODIUM SERPL-SCNC: 143 MMOL/L (ref 136–145)
T AXIS: 100 DEGREES
T OFFSET: 429 MS
VENTRICULAR RATE: 84 BPM
WBC # BLD AUTO: 10 X10*3/UL (ref 4.4–11.3)

## 2024-06-25 PROCEDURE — 92610 EVALUATE SWALLOWING FUNCTION: CPT | Mod: GN | Performed by: SPEECH-LANGUAGE PATHOLOGIST

## 2024-06-25 PROCEDURE — 2500000005 HC RX 250 GENERAL PHARMACY W/O HCPCS: Performed by: NURSE PRACTITIONER

## 2024-06-25 PROCEDURE — 82947 ASSAY GLUCOSE BLOOD QUANT: CPT | Mod: 91

## 2024-06-25 PROCEDURE — 2500000004 HC RX 250 GENERAL PHARMACY W/ HCPCS (ALT 636 FOR OP/ED): Performed by: NURSE PRACTITIONER

## 2024-06-25 PROCEDURE — 85027 COMPLETE CBC AUTOMATED: CPT | Performed by: NURSE PRACTITIONER

## 2024-06-25 PROCEDURE — 1200000002 HC GENERAL ROOM WITH TELEMETRY DAILY

## 2024-06-25 PROCEDURE — 2500000004 HC RX 250 GENERAL PHARMACY W/ HCPCS (ALT 636 FOR OP/ED): Performed by: INTERNAL MEDICINE

## 2024-06-25 PROCEDURE — 94640 AIRWAY INHALATION TREATMENT: CPT

## 2024-06-25 PROCEDURE — 2500000001 HC RX 250 WO HCPCS SELF ADMINISTERED DRUGS (ALT 637 FOR MEDICARE OP): Performed by: NURSE PRACTITIONER

## 2024-06-25 PROCEDURE — 2500000002 HC RX 250 W HCPCS SELF ADMINISTERED DRUGS (ALT 637 FOR MEDICARE OP, ALT 636 FOR OP/ED): Performed by: NURSE PRACTITIONER

## 2024-06-25 PROCEDURE — 80048 BASIC METABOLIC PNL TOTAL CA: CPT | Performed by: NURSE PRACTITIONER

## 2024-06-25 RX ADMIN — AMPICILLIN SODIUM AND SULBACTAM SODIUM 3 G: 2; 1 INJECTION, POWDER, FOR SOLUTION INTRAMUSCULAR; INTRAVENOUS at 04:43

## 2024-06-25 RX ADMIN — DIAZEPAM 2 MG: 10 INJECTION, SOLUTION INTRAMUSCULAR; INTRAVENOUS at 21:38

## 2024-06-25 RX ADMIN — AMPICILLIN SODIUM AND SULBACTAM SODIUM 3 G: 2; 1 INJECTION, POWDER, FOR SOLUTION INTRAMUSCULAR; INTRAVENOUS at 16:22

## 2024-06-25 RX ADMIN — ALBUTEROL SULFATE 2.5 MG: 2.5 SOLUTION RESPIRATORY (INHALATION) at 14:44

## 2024-06-25 RX ADMIN — Medication 10 ML: at 06:03

## 2024-06-25 RX ADMIN — Medication 3 L/MIN: at 08:22

## 2024-06-25 RX ADMIN — CHLORHEXIDINE GLUCONATE 0.12% ORAL RINSE 15 ML: 1.2 LIQUID ORAL at 08:28

## 2024-06-25 RX ADMIN — Medication 10 ML: at 14:52

## 2024-06-25 RX ADMIN — CHLORHEXIDINE GLUCONATE 0.12% ORAL RINSE 15 ML: 1.2 LIQUID ORAL at 21:35

## 2024-06-25 RX ADMIN — AMPICILLIN SODIUM AND SULBACTAM SODIUM 3 G: 2; 1 INJECTION, POWDER, FOR SOLUTION INTRAMUSCULAR; INTRAVENOUS at 10:15

## 2024-06-25 RX ADMIN — ALBUTEROL SULFATE 2.5 MG: 2.5 SOLUTION RESPIRATORY (INHALATION) at 08:22

## 2024-06-25 RX ADMIN — DEXTROSE MONOHYDRATE 12.5 G: 25 INJECTION, SOLUTION INTRAVENOUS at 08:28

## 2024-06-25 RX ADMIN — Medication 10 ML: at 22:59

## 2024-06-25 RX ADMIN — AMPICILLIN SODIUM AND SULBACTAM SODIUM 3 G: 2; 1 INJECTION, POWDER, FOR SOLUTION INTRAMUSCULAR; INTRAVENOUS at 23:10

## 2024-06-25 RX ADMIN — SODIUM CHLORIDE, POTASSIUM CHLORIDE, SODIUM LACTATE AND CALCIUM CHLORIDE 50 ML/HR: 600; 310; 30; 20 INJECTION, SOLUTION INTRAVENOUS at 06:29

## 2024-06-25 ASSESSMENT — COGNITIVE AND FUNCTIONAL STATUS - GENERAL
DRESSING REGULAR LOWER BODY CLOTHING: TOTAL
STANDING UP FROM CHAIR USING ARMS: TOTAL
TURNING FROM BACK TO SIDE WHILE IN FLAT BAD: TOTAL
MOBILITY SCORE: 6
TOILETING: TOTAL
MOVING TO AND FROM BED TO CHAIR: TOTAL
PERSONAL GROOMING: TOTAL
DRESSING REGULAR UPPER BODY CLOTHING: TOTAL
EATING MEALS: TOTAL
DAILY ACTIVITIY SCORE: 6
CLIMB 3 TO 5 STEPS WITH RAILING: TOTAL
MOVING FROM LYING ON BACK TO SITTING ON SIDE OF FLAT BED WITH BEDRAILS: TOTAL
HELP NEEDED FOR BATHING: TOTAL
WALKING IN HOSPITAL ROOM: TOTAL

## 2024-06-25 ASSESSMENT — PAIN SCALES - WONG BAKER: WONGBAKER_NUMERICALRESPONSE: NO HURT

## 2024-06-25 ASSESSMENT — PAIN SCALES - GENERAL: PAINLEVEL_OUTOF10: 0 - NO PAIN

## 2024-06-25 NOTE — PROGRESS NOTES
"Subjective  Patient still lethargic related to her dementia  Nursing staff was interviewed  Objectives    Last Recorded Vitals  Blood pressure 129/73, pulse 86, temperature 36.4 °C (97.5 °F), temperature source Temporal, resp. rate 18, height 1.651 m (5' 5\"), weight (!) 38.6 kg (85 lb 1.6 oz), SpO2 97%.    Physical Exam  HENT:      Right Ear: External ear normal.      Left Ear: External ear normal.      Mouth/Throat:      Mouth: Mucous membranes are moist.   Cardiovascular:      Rate and Rhythm: Normal rate and regular rhythm.      Heart sounds: No murmur heard.     No friction rub. No gallop.   Pulmonary:      Effort: No accessory muscle usage or respiratory distress.      Breath sounds: No stridor. No wheezing or rhonchi.   Chest:      Chest wall: No tenderness.   Abdominal:      General: There is no distension.      Palpations: There is no mass.      Tenderness: There is no abdominal tenderness. There is no guarding or rebound.   Musculoskeletal:         General: No deformity or signs of injury.      Cervical back: No rigidity or tenderness. Normal range of motion.      Right lower leg: No edema.      Left lower leg: No edema.   Skin:     Coloration: Skin is not jaundiced or pale.      Findings: No lesion.   Neurological:   Lethargic confused       Labs    Admission on 06/22/2024   Component Date Value Ref Range Status    WBC 06/22/2024 5.9  4.4 - 11.3 x10*3/uL Final    nRBC 06/22/2024 0.0  0.0 - 0.0 /100 WBCs Final    RBC 06/22/2024 4.60  4.00 - 5.20 x10*6/uL Final    Hemoglobin 06/22/2024 13.7  12.0 - 16.0 g/dL Final    Hematocrit 06/22/2024 42.0  36.0 - 46.0 % Final    MCV 06/22/2024 91  80 - 100 fL Final    MCH 06/22/2024 29.8  26.0 - 34.0 pg Final    MCHC 06/22/2024 32.6  32.0 - 36.0 g/dL Final    RDW 06/22/2024 13.3  11.5 - 14.5 % Final    Platelets 06/22/2024 191  150 - 450 x10*3/uL Final    Neutrophils % 06/22/2024 72.8  40.0 - 80.0 % Final    Immature Granulocytes %, Automated 06/22/2024 0.2  0.0 - 0.9 % " Final    Immature Granulocyte Count (IG) includes promyelocytes, myelocytes and metamyelocytes but does not include bands. Percent differential counts (%) should be interpreted in the context of the absolute cell counts (cells/UL).    Lymphocytes % 06/22/2024 17.7  13.0 - 44.0 % Final    Monocytes % 06/22/2024 7.1  2.0 - 10.0 % Final    Eosinophils % 06/22/2024 1.5  0.0 - 6.0 % Final    Basophils % 06/22/2024 0.7  0.0 - 2.0 % Final    Neutrophils Absolute 06/22/2024 4.33  1.60 - 5.50 x10*3/uL Final    Percent differential counts (%) should be interpreted in the context of the absolute cell counts (cells/uL).    Immature Granulocytes Absolute, Au* 06/22/2024 0.01  0.00 - 0.50 x10*3/uL Final    Lymphocytes Absolute 06/22/2024 1.05  0.80 - 3.00 x10*3/uL Final    Monocytes Absolute 06/22/2024 0.42  0.05 - 0.80 x10*3/uL Final    Eosinophils Absolute 06/22/2024 0.09  0.00 - 0.40 x10*3/uL Final    Basophils Absolute 06/22/2024 0.04  0.00 - 0.10 x10*3/uL Final    Glucose 06/22/2024 91  74 - 99 mg/dL Final    Sodium 06/22/2024 137  136 - 145 mmol/L Final    Potassium 06/22/2024 4.5  3.5 - 5.3 mmol/L Final    MILD HEMOLYSIS DETECTED. The result may be falsely elevated due to hemolysis or other interferents. Clinical correlation is recommended. Repeat testing may be considered.    Chloride 06/22/2024 102  98 - 107 mmol/L Final    Bicarbonate 06/22/2024 25  21 - 32 mmol/L Final    Anion Gap 06/22/2024 15  10 - 20 mmol/L Final    Urea Nitrogen 06/22/2024 17  6 - 23 mg/dL Final    Creatinine 06/22/2024 0.43 (L)  0.50 - 1.05 mg/dL Final    eGFR 06/22/2024 >90  >60 mL/min/1.73m*2 Final    Calculations of estimated GFR are performed using the 2021 CKD-EPI Study Refit equation without the race variable for the IDMS-Traceable creatinine methods.  https://jasn.asnjournals.org/content/early/2021/09/22/ASN.9591354522    Calcium 06/22/2024 9.2  8.6 - 10.3 mg/dL Final    Albumin 06/22/2024 4.0  3.4 - 5.0 g/dL Final    Alkaline Phosphatase  06/22/2024 65  33 - 136 U/L Final    Total Protein 06/22/2024 6.8  6.4 - 8.2 g/dL Final    AST 06/22/2024 18  9 - 39 U/L Final    MILD HEMOLYSIS DETECTED. The result may be falsely elevated due to hemolysis or other interferents. Clinical correlation is recommended. Repeat testing may be considered.    Bilirubin, Total 06/22/2024 0.5  0.0 - 1.2 mg/dL Final    ALT 06/22/2024 <3 (L)  7 - 45 U/L Final    Patients treated with Sulfasalazine may generate falsely decreased results for ALT.    Lactate 06/22/2024 0.7  0.4 - 2.0 mmol/L Final    Blood Culture 06/22/2024 No growth at 2 days   Preliminary    Blood Culture 06/22/2024 No growth at 2 days   Preliminary    POCT pH, Venous 06/22/2024 7.45 (H)  7.33 - 7.43 pH Final    POCT pCO2, Venous 06/22/2024 44  41 - 51 mm Hg Final    POCT pO2, Venous 06/22/2024 46 (H)  35 - 45 mm Hg Final    POCT SO2, Venous 06/22/2024 70  45 - 75 % Final    POCT Oxy Hemoglobin, Venous 06/22/2024 69.3  45.0 - 75.0 % Final    POCT Hematocrit Calculated, Venous 06/22/2024 43.0  36.0 - 46.0 % Final    POCT Sodium, Venous 06/22/2024 134 (L)  136 - 145 mmol/L Final    POCT Potassium, Venous 06/22/2024 4.9  3.5 - 5.3 mmol/L Final    POCT Chloride, Venous 06/22/2024 104  98 - 107 mmol/L Final    POCT Ionized Calicum, Venous 06/22/2024 1.18  1.10 - 1.33 mmol/L Final    POCT Glucose, Venous 06/22/2024 96  74 - 99 mg/dL Final    POCT Lactate, Venous 06/22/2024 1.0  0.4 - 2.0 mmol/L Final    POCT Base Excess, Venous 06/22/2024 5.8 (H)  -2.0 - 3.0 mmol/L Final    POCT HCO3 Calculated, Venous 06/22/2024 30.6 (H)  22.0 - 26.0 mmol/L Final    POCT Hemoglobin, Venous 06/22/2024 14.3  12.0 - 16.0 g/dL Final    POCT Anion Gap, Venous 06/22/2024 4.0 (L)  10.0 - 25.0 mmol/L Final    Patient Temperature 06/22/2024    Final    NOTE: Patient Results are Not Corrected for Temperature    FiO2 06/22/2024 21  % Final    Color, Urine 06/23/2024 Yellow  Light-Yellow, Yellow, Dark-Yellow Final    Appearance, Urine  06/23/2024 Clear  Clear Final    Specific Gravity, Urine 06/23/2024 1.042 (N)  1.005 - 1.035 Final    pH, Urine 06/23/2024 5.5  5.0, 5.5, 6.0, 6.5, 7.0, 7.5, 8.0 Final    Protein, Urine 06/23/2024 20 (TRACE)  NEGATIVE, 10 (TRACE), 20 (TRACE) mg/dL Final    Glucose, Urine 06/23/2024 500 (3+) (A)  Normal mg/dL Final    Blood, Urine 06/23/2024 NEGATIVE  NEGATIVE Final    Ketones, Urine 06/23/2024 40 (2+) (A)  NEGATIVE mg/dL Final    Bilirubin, Urine 06/23/2024 NEGATIVE  NEGATIVE Final    Urobilinogen, Urine 06/23/2024 Normal  Normal mg/dL Final    Nitrite, Urine 06/23/2024 2+ (A)  NEGATIVE Final    Leukocyte Esterase, Urine 06/23/2024 500 Maikel/µL (A)  NEGATIVE Final    WBC 06/23/2024 8.0  4.4 - 11.3 x10*3/uL Final    nRBC 06/23/2024 0.0  0.0 - 0.0 /100 WBCs Final    RBC 06/23/2024 4.58  4.00 - 5.20 x10*6/uL Final    Hemoglobin 06/23/2024 13.5  12.0 - 16.0 g/dL Final    Hematocrit 06/23/2024 43.1  36.0 - 46.0 % Final    MCV 06/23/2024 94  80 - 100 fL Final    MCH 06/23/2024 29.5  26.0 - 34.0 pg Final    MCHC 06/23/2024 31.3 (L)  32.0 - 36.0 g/dL Final    RDW 06/23/2024 13.4  11.5 - 14.5 % Final    Platelets 06/23/2024 183  150 - 450 x10*3/uL Final    Glucose 06/23/2024 64 (L)  74 - 99 mg/dL Final    Sodium 06/23/2024 139  136 - 145 mmol/L Final    Potassium 06/23/2024 3.9  3.5 - 5.3 mmol/L Final    Chloride 06/23/2024 105  98 - 107 mmol/L Final    Bicarbonate 06/23/2024 23  21 - 32 mmol/L Final    Anion Gap 06/23/2024 15  10 - 20 mmol/L Final    Urea Nitrogen 06/23/2024 14  6 - 23 mg/dL Final    Creatinine 06/23/2024 0.40 (L)  0.50 - 1.05 mg/dL Final    eGFR 06/23/2024 >90  >60 mL/min/1.73m*2 Final    Calculations of estimated GFR are performed using the 2021 CKD-EPI Study Refit equation without the race variable for the IDMS-Traceable creatinine methods.  https://jasn.asnjournals.org/content/early/2021/09/22/ASN.7676235951    Calcium 06/23/2024 9.0  8.6 - 10.3 mg/dL Final    POCT Glucose 06/23/2024 56 (L)  74 - 99 mg/dL  Final    POCT Glucose 06/23/2024 163 (H)  74 - 99 mg/dL Final    Extra Tube 06/24/2024 Hold for add-ons.   Final    Auto resulted.    WBC, Urine 06/23/2024 11-20 (A)  1-5, NONE /HPF Final    RBC, Urine 06/23/2024 1-2  NONE, 1-2, 3-5 /HPF Final    Squamous Epithelial Cells, Urine 06/23/2024 1-9 (SPARSE)  Reference range not established. /HPF Final    Bacteria, Urine 06/23/2024 4+ (A)  NONE SEEN /HPF Final    Mucus, Urine 06/23/2024 FEW  Reference range not established. /LPF Final    Calcium Oxalate Crystals, Urine 06/23/2024 1+  NONE, 1+ /HPF Final    WBC 06/24/2024 13.6 (H)  4.4 - 11.3 x10*3/uL Final    nRBC 06/24/2024 0.0  0.0 - 0.0 /100 WBCs Final    RBC 06/24/2024 4.40  4.00 - 5.20 x10*6/uL Final    Hemoglobin 06/24/2024 13.0  12.0 - 16.0 g/dL Final    Hematocrit 06/24/2024 41.0  36.0 - 46.0 % Final    MCV 06/24/2024 93  80 - 100 fL Final    MCH 06/24/2024 29.5  26.0 - 34.0 pg Final    MCHC 06/24/2024 31.7 (L)  32.0 - 36.0 g/dL Final    RDW 06/24/2024 13.2  11.5 - 14.5 % Final    Platelets 06/24/2024 149 (L)  150 - 450 x10*3/uL Final    Glucose 06/24/2024 109 (H)  74 - 99 mg/dL Final    Sodium 06/24/2024 140  136 - 145 mmol/L Final    Potassium 06/24/2024 3.5  3.5 - 5.3 mmol/L Final    Chloride 06/24/2024 106  98 - 107 mmol/L Final    Bicarbonate 06/24/2024 22  21 - 32 mmol/L Final    Anion Gap 06/24/2024 16  10 - 20 mmol/L Final    Urea Nitrogen 06/24/2024 13  6 - 23 mg/dL Final    Creatinine 06/24/2024 0.47 (L)  0.50 - 1.05 mg/dL Final    eGFR 06/24/2024 >90  >60 mL/min/1.73m*2 Final    Calculations of estimated GFR are performed using the 2021 CKD-EPI Study Refit equation without the race variable for the IDMS-Traceable creatinine methods.  https://jasn.asnjournals.org/content/early/2021/09/22/ASN.6475051243    Calcium 06/24/2024 8.5 (L)  8.6 - 10.3 mg/dL Final    POCT Glucose 06/24/2024 104 (H)  74 - 99 mg/dL Final    Coronavirus 2019, PCR 06/24/2024 Not Detected  Not Detected Final    POCT Glucose 06/24/2024  82  74 - 99 mg/dL Final    POCT Glucose 06/24/2024 82  74 - 99 mg/dL Final    WBC 06/25/2024 10.0  4.4 - 11.3 x10*3/uL Final    nRBC 06/25/2024 0.0  0.0 - 0.0 /100 WBCs Final    RBC 06/25/2024 4.16  4.00 - 5.20 x10*6/uL Final    Hemoglobin 06/25/2024 12.4  12.0 - 16.0 g/dL Final    Hematocrit 06/25/2024 38.1  36.0 - 46.0 % Final    MCV 06/25/2024 92  80 - 100 fL Final    MCH 06/25/2024 29.8  26.0 - 34.0 pg Final    MCHC 06/25/2024 32.5  32.0 - 36.0 g/dL Final    RDW 06/25/2024 13.5  11.5 - 14.5 % Final    Platelets 06/25/2024 148 (L)  150 - 450 x10*3/uL Final    Glucose 06/25/2024 79  74 - 99 mg/dL Final    Sodium 06/25/2024 143  136 - 145 mmol/L Final    Potassium 06/25/2024 3.9  3.5 - 5.3 mmol/L Final    Chloride 06/25/2024 107  98 - 107 mmol/L Final    Bicarbonate 06/25/2024 26  21 - 32 mmol/L Final    Anion Gap 06/25/2024 14  10 - 20 mmol/L Final    Urea Nitrogen 06/25/2024 11  6 - 23 mg/dL Final    Creatinine 06/25/2024 0.38 (L)  0.50 - 1.05 mg/dL Final    eGFR 06/25/2024 >90  >60 mL/min/1.73m*2 Final    Calculations of estimated GFR are performed using the 2021 CKD-EPI Study Refit equation without the race variable for the IDMS-Traceable creatinine methods.  https://jasn.asnjournals.org/content/early/2021/09/22/ASN.6281893198    Calcium 06/25/2024 8.4 (L)  8.6 - 10.3 mg/dL Final    POCT Glucose 06/25/2024 75  74 - 99 mg/dL Final         Imaging     XR chest 1 view  Narrative: Interpreted By:  Estevan Hewitt,   STUDY:  XR CHEST 1 VIEW;  6/23/2024 3:43 pm      INDICATION:  Signs/Symptoms:Hypoxemia.      COMPARISON:  06/23/2024      ACCESSION NUMBER(S):  [id number]      ORDERING CLINICIAN:  ROMEO VELA      FINDINGS:  Increased density of opacification of the left mid to lower lung.  Leftward mediastinal shift has worsened. Pleural effusion not  excluded. Right lung appears clear. Cardiac silhouette partially  obscured. Aortic atherosclerosis. Degenerative changes.      Impression: Worsening volume loss  of the left lung.      MACRO:  None      Signed by: Estevan Hewitt 6/24/2024 8:20 AM  Dictation workstation:   [id number]       Patient Active Problem List   Diagnosis    Acute hypoxic respiratory failure (Multi)    Aspiration into airway         Assessment/Plan   Principal Problem:    Acute hypoxic respiratory failure (Multi)  Active Problems:    Aspiration into airway      Acute respiratory failure currently on 3 L of oxygen from aspiration pneumonia continue to wean off oxygen  Patient and able to go to To cooperate with speech therapy we will connect with the family about alternative mode of feeding her family is considering hospice  Considering her poor condition and severe dementia  Acute metabolic encephalopathy a combination of baseline dementia plus or aspiration pneumonia             NEERAJ Palomo MD

## 2024-06-25 NOTE — CARE PLAN
The patient's goals for the shift include  remaining comfortable throughout shift.    The clinical goals for the shift include See POC      Problem: Pain - Adult  Goal: Verbalizes/displays adequate comfort level or baseline comfort level  Outcome: Progressing     Problem: Safety - Adult  Goal: Free from fall injury  Outcome: Progressing     Problem: Discharge Planning  Goal: Discharge to home or other facility with appropriate resources  Outcome: Progressing     Problem: Chronic Conditions and Co-morbidities  Goal: Patient's chronic conditions and co-morbidity symptoms are monitored and maintained or improved  Outcome: Progressing     Problem: Skin  Goal: Decreased wound size/increased tissue granulation at next dressing change  Outcome: Progressing  Goal: Participates in plan/prevention/treatment measures  Outcome: Progressing  Goal: Prevent/manage excess moisture  Outcome: Progressing  Flowsheets (Taken 6/25/2024 0200)  Prevent/manage excess moisture: Cleanse incontinence/protect with barrier cream  Goal: Prevent/minimize sheer/friction injuries  Outcome: Progressing  Goal: Promote/optimize nutrition  Outcome: Progressing  Goal: Promote skin healing  Outcome: Progressing     Problem: Nutrition  Goal: Less than 5 days NPO/clear liquids  Outcome: Progressing  Goal: Oral intake greater than 50%  Outcome: Progressing  Goal: Oral intake greater 75%  Outcome: Progressing  Goal: Consume prescribed supplement  Outcome: Progressing  Goal: Adequate PO fluid intake  Outcome: Progressing  Goal: Nutrition support goals are met within 48 hrs  Outcome: Progressing  Goal: Nutrition support is meeting 75% of nutrient needs  Outcome: Progressing  Goal: Tube feed tolerance  Outcome: Progressing  Goal: BG  mg/dL  Outcome: Progressing  Goal: Lab values WNL  Outcome: Progressing  Goal: Electrolytes WNL  Outcome: Progressing  Goal: Promote healing  Outcome: Progressing  Goal: Maintain stable weight  Outcome: Progressing  Goal:  Reduce weight from edema/fluid  Outcome: Progressing  Goal: Gradual weight gain  Outcome: Progressing  Goal: Improve ostomy output  Outcome: Progressing

## 2024-06-25 NOTE — NURSING NOTE
Pt had uneventful night with no acute changes in condition. Pt tolerated being turned Q2 hours. Pt safety maintained throughout night.

## 2024-06-25 NOTE — PROGRESS NOTES
Speech-Language Pathology    [facility] Adult Inpatient Speech-Language Pathology Clinical Swallow Evaluation    Patient Name: Snehal Souza  MRN: [id number]  Today's Date: 6/25/2024   Time Calculation  Start Time: 1718  Stop Time: 1729  Time Calculation (min): 11 min       Current Problem:   1. Acute hypoxic respiratory failure (Multi)        2. Aspiration into airway, initial encounter          Recommendations:  Diet: NPO with alternate means nutrition/hydration  Medication Administration: Non oral  Strategies: Sit upright (30-45 degrees) at all times; frequent, aggressive oral care to improve infection control as well as reduce dental plaque and bacteria on oropharyngeal surfaces (which may increase the risk nosocomial infections, including pneumonia)    Instrumental Evaluation: Modified Barium Swallow Study (MBSS) to assess swallow physiology    Assessment:  Consistencies Trialed: Pureed; Thin Liquids (i.e., ice chips only)   Oral motor exam: Pt generally unable to comprehend directives for (vs could not execute) oral motor movements/volitional swallow/cough. That which could be gleaned is as follows: Pt able to achieve labial approximation during labial strength assessment, and during assessment of buccal strength/ROM, although pt able to puff cheeks minimally to command, she was unable to sustain this movement. During lingual strength assessment, pt lateralized tongue to L only minimally only. Pt with upper/lower natural dentition.  Pt with mildly to moderately severe oral dysphagia, characterized by decreased bolus manipulation/mastication/A-P propulsion, however, she was able to functionally clear presented textures.   Pharyngeal dysphagia suspect, characterized by coughing after 1 out of 3 ice chips, and up to 4 pharyngeal swallows per pureed bolus, followed by weak, congested coughing. Further bolus trials were deferred.   Pt unable to respond to orientation questions 2/2 cognitive communication deficits. Only one  unrelated phrase and sentence level comment each elicited. Pt is considered to be at risk for aspiration at this time.   Per the results of this assessment, patient is not appropriate tor PO at this time. Please refer to recommendations and precautions as noted above.   ST to continue to follow patient during inpatient stay.      Plan:   Skilled speech therapy for dysphagia treatment is warranted for ongoing assessment of oropharyngeal swallow function, to ensure tolerance of safest and least restrictive consistencies, to provide training and instruction regarding the use of compensatory swallow strategies and pt/caregiver education in order to reduce risk of aspiration, dehydration and malnutrition.  Frequency: 1 session  Duration: Current admission   SLP Discharge Recommendations: To be determined. Hospice meeting planned for tomorrow per DELIA Garg.     Goals:  1. Patient will complete a modified barium swallow study (MBSS) for objective assessment of swallowing function, to assess for aspiration, and to guide further recommendations and treatment plan.     Subjective:  Pt seen at bedside for clinical swallow evaluation. She was assisted into an upright position for PO trials and was alert and cooperative throughout session. Vocal intensity was decreased, with occasionally aphonia noted (noted pt was 'mouthing' words).        Baseline Assessment:  O2 use: 2 L via NC      General Visit Information:  Clinical Swallow Evaluation was completed on 6/23/2024 with recommendation NPO.   Current diet level is NPO.   DELIA Garg reports pt is much more alert than she has been, and is verbalizing more. Pt is following some commands (which is new), and can say yes/no. Pt's mouth stays open, and she starts moaning. She'll be alert for 20 mins, then would not answer. Pt had been on minced moist and regular liquids per her SNF paperwork. Prior to admission, pt aspirated when presented with her meds in applesauce. Pt's dtr Humaira  has requested she have another swallow evaluation. RN wonders if pt may be a candidate for pleasure feeds. Hospice mtg is planned for tomorrow 6/26/24.   Pt unable to report swallowing concerns (most likely d/t cognitive communication impairment).   Dtr reports pt has had mucus, and wondered if she's getting anything for this. RN informed.     History of Present Illness:  Per EMR, “Snehal Souza is a 74 y.o. female presenting with past medical history of Lewy body dementia coronary artery disease admitted to the hospital status post aspiration at the nursing home patient was eating and she choked on food became hypoxic.     Past Medical History  She has a past medical history of Ambulatory dysfunction, Anxiety and depression, Cachexia (Multi), Chronic prescription benzodiazepine use, Coronary artery disease, Dysphagia, oropharyngeal, History of cancer of left breast, History of closed head injury, History of COVID-19 (01/2022), History of hip fracture, Hyperlipidemia, Irritable bowel syndrome with diarrhea, Lewy body dementia (Multi), Osteopenia, Osteoporosis, Parkinson's disease (Multi), Pulmonary nodule, Recurrent falls, Seizure disorder (Multi), Underweight, Vitamin D deficiency, and Wheelchair dependence.    Imaging:      XR chest 1 view  Narrative: Interpreted By:  Krystian Acuña,   STUDY:  XR CHEST 1 VIEW;  6/23/2024 8:25 am      INDICATION:  Signs/Symptoms:left sided aspiration surveilance.      COMPARISON:  CT chest with contrast 22 June 2024 portable chest earlier same day  22 June 2024      ACCESSION NUMBER(S):  [id number]      ORDERING CLINICIAN:  HOWARD HENDERSON      TECHNIQUE:  Single frontal view of the chest; Portable technique      FINDINGS:  Today's radiograph is with the patient is significantly rotated,  limiting exam and causing partially artifactual opacification of left  hemithorax      Right hemithorax remains clear      The cardiomediastinal silhouette is unchanged      Impression: Limited  "exam due to rotated patient. No definite interval  cardiopulmonary change      MACRO:  None      Signed by: Krystian Reyesdavion 6/23/2024 8:58 AM  Dictation workstation:   [id number]     Assessment/Plan   Acute hypoxic respiratory failure (Multi)  Aspiration into airway     Acute aspiration requiring oxygen possible chemical reaction if white count or fever will add some antibiotic to cover bacterial aspiration  Pulmonary note was reviewed continue to monitor history of dementia poor prognosis patient is DNR arrest.\"      Pain:  Rating scale: 0-10   Pt report: 0      Education:   Learner pt; dtr Humaira; RN Britany  Barriers to Learning Acuity of illness; cognitive communication limitations  Method demonstration; verbal; visual  Education-Topic SLP provided patient education regarding role of SLP, purpose of assessment and clinical impressions/recommendations. Patient verbalized questionable full comprehension, consistent with cognitive status. SLP further coordinated with RN regarding recommendations and precautions per this assessment, with RN verbalizing understanding.  Outcome: Needs review and reinforcement  "

## 2024-06-25 NOTE — NURSING NOTE
Pt was cleaned up this morning and is more awake. Pts eyes are open and she is able to mumble words. Remains confused per baseline. Daughter Humaira at bedside. Continued goals of care discussion. She is requesting speech come back today since the pt is more alert. She would also like a referral to hospice of the [facility] as she feels that hospice if the best choice for her moving forward for additional support and comfort care. Referral sent to [facility], awaiting meeting time.     Will update with hospice meeting information once known.  NP, RN updated on plan of care.  RN notified speech of daughters request.

## 2024-06-25 NOTE — NURSING NOTE
Patient resting in bed. Bed alarm remains on. IVF running. Patient minimally responsive. Patient moaning and groaning and making incomprehensible sounds. Patient did answer 2 yes/no questions. Daughter would like speech to come see patient and evaluate again to see if there is any improvement if patient can eat.     Patient more alert as day progressed. Patient still mumbles and groans but does intermittently say a few words and answer yes or no questions. Daughter at bedside today for most of day. Patient turned Q2 hours, tele on and IVF running.

## 2024-06-26 ENCOUNTER — APPOINTMENT (OUTPATIENT)
Dept: RADIOLOGY | Facility: HOSPITAL | Age: 74
End: 2024-06-26
Payer: COMMERCIAL

## 2024-06-26 LAB
ANION GAP SERPL CALC-SCNC: 15 MMOL/L (ref 10–20)
BACTERIA UR CULT: NORMAL
BUN SERPL-MCNC: 6 MG/DL (ref 6–23)
CALCIUM SERPL-MCNC: 8.1 MG/DL (ref 8.6–10.3)
CHLORIDE SERPL-SCNC: 104 MMOL/L (ref 98–107)
CO2 SERPL-SCNC: 23 MMOL/L (ref 21–32)
CREAT SERPL-MCNC: 0.28 MG/DL (ref 0.5–1.05)
EGFRCR SERPLBLD CKD-EPI 2021: >90 ML/MIN/1.73M*2
ERYTHROCYTE [DISTWIDTH] IN BLOOD BY AUTOMATED COUNT: 13.4 % (ref 11.5–14.5)
GLUCOSE BLD MANUAL STRIP-MCNC: 132 MG/DL (ref 74–99)
GLUCOSE BLD MANUAL STRIP-MCNC: 190 MG/DL (ref 74–99)
GLUCOSE BLD MANUAL STRIP-MCNC: 59 MG/DL (ref 74–99)
GLUCOSE BLD MANUAL STRIP-MCNC: 69 MG/DL (ref 74–99)
GLUCOSE SERPL-MCNC: 87 MG/DL (ref 74–99)
HCT VFR BLD AUTO: 38.9 % (ref 36–46)
HGB BLD-MCNC: 12.7 G/DL (ref 12–16)
MCH RBC QN AUTO: 30.3 PG (ref 26–34)
MCHC RBC AUTO-ENTMCNC: 32.6 G/DL (ref 32–36)
MCV RBC AUTO: 93 FL (ref 80–100)
NRBC BLD-RTO: 0 /100 WBCS (ref 0–0)
PLATELET # BLD AUTO: 147 X10*3/UL (ref 150–450)
POTASSIUM SERPL-SCNC: 3.2 MMOL/L (ref 3.5–5.3)
RBC # BLD AUTO: 4.19 X10*6/UL (ref 4–5.2)
SODIUM SERPL-SCNC: 139 MMOL/L (ref 136–145)
WBC # BLD AUTO: 10.4 X10*3/UL (ref 4.4–11.3)

## 2024-06-26 PROCEDURE — 92611 MOTION FLUOROSCOPY/SWALLOW: CPT | Mod: GN | Performed by: SPEECH-LANGUAGE PATHOLOGIST

## 2024-06-26 PROCEDURE — 1200000002 HC GENERAL ROOM WITH TELEMETRY DAILY

## 2024-06-26 PROCEDURE — 2500000004 HC RX 250 GENERAL PHARMACY W/ HCPCS (ALT 636 FOR OP/ED): Performed by: NURSE PRACTITIONER

## 2024-06-26 PROCEDURE — 36415 COLL VENOUS BLD VENIPUNCTURE: CPT | Performed by: NURSE PRACTITIONER

## 2024-06-26 PROCEDURE — 82947 ASSAY GLUCOSE BLOOD QUANT: CPT | Mod: 91

## 2024-06-26 PROCEDURE — 74230 X-RAY XM SWLNG FUNCJ C+: CPT

## 2024-06-26 PROCEDURE — 2500000005 HC RX 250 GENERAL PHARMACY W/O HCPCS: Performed by: NURSE PRACTITIONER

## 2024-06-26 PROCEDURE — 94640 AIRWAY INHALATION TREATMENT: CPT

## 2024-06-26 PROCEDURE — 2500000002 HC RX 250 W HCPCS SELF ADMINISTERED DRUGS (ALT 637 FOR MEDICARE OP, ALT 636 FOR OP/ED): Performed by: NURSE PRACTITIONER

## 2024-06-26 PROCEDURE — 74230 X-RAY XM SWLNG FUNCJ C+: CPT | Performed by: INTERNAL MEDICINE

## 2024-06-26 PROCEDURE — 80048 BASIC METABOLIC PNL TOTAL CA: CPT | Performed by: NURSE PRACTITIONER

## 2024-06-26 PROCEDURE — 2500000001 HC RX 250 WO HCPCS SELF ADMINISTERED DRUGS (ALT 637 FOR MEDICARE OP): Performed by: NURSE PRACTITIONER

## 2024-06-26 PROCEDURE — 2500000004 HC RX 250 GENERAL PHARMACY W/ HCPCS (ALT 636 FOR OP/ED): Performed by: INTERNAL MEDICINE

## 2024-06-26 PROCEDURE — 85027 COMPLETE CBC AUTOMATED: CPT | Performed by: NURSE PRACTITIONER

## 2024-06-26 PROCEDURE — 2500000005 HC RX 250 GENERAL PHARMACY W/O HCPCS: Performed by: INTERNAL MEDICINE

## 2024-06-26 RX ORDER — POTASSIUM CHLORIDE 14.9 MG/ML
20 INJECTION INTRAVENOUS
Status: COMPLETED | OUTPATIENT
Start: 2024-06-26 | End: 2024-06-26

## 2024-06-26 RX ADMIN — Medication 10 ML: at 22:22

## 2024-06-26 RX ADMIN — BARIUM SULFATE 5 ML: 400 SUSPENSION ORAL at 09:28

## 2024-06-26 RX ADMIN — DIAZEPAM 2 MG: 10 INJECTION, SOLUTION INTRAMUSCULAR; INTRAVENOUS at 21:00

## 2024-06-26 RX ADMIN — ALBUTEROL SULFATE 2.5 MG: 2.5 SOLUTION RESPIRATORY (INHALATION) at 13:10

## 2024-06-26 RX ADMIN — DEXTROSE MONOHYDRATE 12.5 G: 25 INJECTION, SOLUTION INTRAVENOUS at 18:09

## 2024-06-26 RX ADMIN — Medication 10 ML: at 12:30

## 2024-06-26 RX ADMIN — AMPICILLIN SODIUM AND SULBACTAM SODIUM 3 G: 2; 1 INJECTION, POWDER, FOR SOLUTION INTRAMUSCULAR; INTRAVENOUS at 05:10

## 2024-06-26 RX ADMIN — SODIUM CHLORIDE, POTASSIUM CHLORIDE, SODIUM LACTATE AND CALCIUM CHLORIDE 50 ML/HR: 600; 310; 30; 20 INJECTION, SOLUTION INTRAVENOUS at 03:37

## 2024-06-26 RX ADMIN — POTASSIUM CHLORIDE 20 MEQ: 14.9 INJECTION, SOLUTION INTRAVENOUS at 07:00

## 2024-06-26 RX ADMIN — POTASSIUM CHLORIDE 20 MEQ: 14.9 INJECTION, SOLUTION INTRAVENOUS at 09:31

## 2024-06-26 RX ADMIN — BARIUM SULFATE 5 ML: 0.81 POWDER, FOR SUSPENSION ORAL at 09:28

## 2024-06-26 RX ADMIN — AMPICILLIN SODIUM AND SULBACTAM SODIUM 3 G: 2; 1 INJECTION, POWDER, FOR SOLUTION INTRAMUSCULAR; INTRAVENOUS at 22:30

## 2024-06-26 RX ADMIN — ALBUTEROL SULFATE 2.5 MG: 2.5 SOLUTION RESPIRATORY (INHALATION) at 20:36

## 2024-06-26 RX ADMIN — Medication 10 ML: at 05:10

## 2024-06-26 RX ADMIN — DIAZEPAM 2 MG: 10 INJECTION, SOLUTION INTRAMUSCULAR; INTRAVENOUS at 09:31

## 2024-06-26 RX ADMIN — AMPICILLIN SODIUM AND SULBACTAM SODIUM 3 G: 2; 1 INJECTION, POWDER, FOR SOLUTION INTRAMUSCULAR; INTRAVENOUS at 17:10

## 2024-06-26 RX ADMIN — CHLORHEXIDINE GLUCONATE 0.12% ORAL RINSE 15 ML: 1.2 LIQUID ORAL at 22:15

## 2024-06-26 RX ADMIN — AMPICILLIN SODIUM AND SULBACTAM SODIUM 3 G: 2; 1 INJECTION, POWDER, FOR SOLUTION INTRAMUSCULAR; INTRAVENOUS at 12:30

## 2024-06-26 RX ADMIN — CHLORHEXIDINE GLUCONATE 0.12% ORAL RINSE 15 ML: 1.2 LIQUID ORAL at 09:30

## 2024-06-26 ASSESSMENT — PAIN SCALES - PAIN ASSESSMENT IN ADVANCED DEMENTIA (PAINAD)
BREATHING: NORMAL
TOTALSCORE: NO NEED TO CONSOLE
BODYLANGUAGE: RELAXED
TOTALSCORE: 0
BREATHING: SMILING OR INEXPRESSIVE

## 2024-06-26 ASSESSMENT — COGNITIVE AND FUNCTIONAL STATUS - GENERAL
STANDING UP FROM CHAIR USING ARMS: TOTAL
MOVING FROM LYING ON BACK TO SITTING ON SIDE OF FLAT BED WITH BEDRAILS: TOTAL
MOVING TO AND FROM BED TO CHAIR: TOTAL
TURNING FROM BACK TO SIDE WHILE IN FLAT BAD: TOTAL
HELP NEEDED FOR BATHING: TOTAL
WALKING IN HOSPITAL ROOM: TOTAL
TOILETING: TOTAL
DRESSING REGULAR UPPER BODY CLOTHING: TOTAL
DAILY ACTIVITIY SCORE: 6
DRESSING REGULAR LOWER BODY CLOTHING: TOTAL
MOBILITY SCORE: 6
PERSONAL GROOMING: TOTAL
EATING MEALS: TOTAL
CLIMB 3 TO 5 STEPS WITH RAILING: TOTAL

## 2024-06-26 ASSESSMENT — PAIN - FUNCTIONAL ASSESSMENT: PAIN_FUNCTIONAL_ASSESSMENT: PAINAD (PAIN ASSESSMENT IN ADVANCED DEMENTIA SCALE)

## 2024-06-26 ASSESSMENT — PAIN SCALES - GENERAL: PAINLEVEL_OUTOF10: 0 - NO PAIN

## 2024-06-26 NOTE — NURSING NOTE
Hospice meeting scheduled for 130/2p bedside with [facility] DELIA Moore. Plan for pt to return to LTC with hospice.    150P  Daughter has asked [facility] RN to reschedule hospice meeting for tomorrow so that her sister could join the meeting. [facility] to reschedule with Humaira for tomorrow.     Appointment rescheduled for tomorrow 6/27 between 130-2p    Pts two daughters are currently at odds with plan of care for pt. Both of them state they have [facility] paperwork naming each of the as first contact and they are going to bring this in tomorrow to see which is most recent and up to date. Humaira states that her sister is against hospice and wants to continue with other sources of nutrition where Humaira does not agree. Humaira is going to review the pts living will before we meet again tomorrow as well. Voiced to Humaira that despite the feud between the two daughters they need to come together to decide what is best for the pt at this point in her journey.    Plan to meet with both daughters tomorrow at 1p before the hospice meeting.     Will follow

## 2024-06-26 NOTE — NURSING NOTE
0840 Dr Palomo at bedside.   0844 pt taken off the floor to [facility].  0925 pt returned to room. Bed alarm active. Call light w/I reach.  Pt aspirated every consistency with MBS, SLP recommending NPO.  1000 oral hygiene done  1300 pt daughter at bedside awaiting hospice meeting.   1520 oral hygiene done.  0818 notified [facility] pt medicated with D50 d/t BS 59.

## 2024-06-26 NOTE — PROGRESS NOTES
Spiritual Care Visit    Clinical Encounter Type  Visited With: Patient and family together  Routine Visit: Follow-up  Continue Visiting: No              Sacramental Encounters  Sacrament of Sick-Anointing: Anointed                             Taxonomy  Intended Effects: Build relationship of care and support, Preserve dignity and respect  Methods: Offer spiritual/Denominational support  Interventions: Perform a Denominational rite or ritual

## 2024-06-26 NOTE — PROGRESS NOTES
Spiritual Care Visit    Clinical Encounter Type  Visited With: Patient and family together  Routine Visit: Introduction  Continue Visiting: No                                            Taxonomy  Intended Effects: Promote sense of peace, Preserve dignity and respect, Meaning-making  Methods: Offer spiritual/Jain support  Interventions: Share words of hope and inspiration, Leland    Patient was with her daughter.  Patient is Sikhism and her daughter requested the anointing and the  coordinated it.  Patient's daughter shared the patient's story as the  listened.

## 2024-06-26 NOTE — DOCUMENTATION CLARIFICATION NOTE
"    PATIENT:               DAVID RAMÍREZ  ACCT #:                  [id number]  MRN:                       [id number]  :                       1950  ADMIT DATE:       2024 5:30 PM  DISCH DATE:  RESPONDING PROVIDER #:        [id number]          PROVIDER RESPONSE TEXT:    Moderate Protein Calorie Malnutrition    CDI QUERY TEXT:    Clarification    Instruction:    Based on your assessment of the patient and the clinical information, please provide the requested documentation by clicking on the appropriate radio button and enter any additional information if prompted.    Question: Please further clarify this patient nutritional status as    When answering this query, please exercise your independent professional judgment. The fact that a question is being asked, does not imply that any particular answer is desired or expected.    The patient's clinical indicators include:  Clinical Information:  74F presents for aspiration, [facility]    Clinical Indicators:  - BMI 14.16  - HP, , Donnell: \"cachexia with BMI of 14 presenting to [facility] on 2024 with complaint of witnessed aspiration.\"  - RD, : Moderate malnutrition related to chronic disease or condition As Evidenced by: acute on chronic nutritional stress with aspiration episode and chronic stress of lewy body dementia with indicators including PO intakes meeting less than 75 percent of needs for greater than 1 month, weight record indicating loss greater than 7.5 percent within the past 3 months, and NFPE showing signs of moderate muscle wasting/fat loss.    Treatment:  RD suggests corpak placement with enteral nutrition (Jevity 1.5), if able to start PO diet, suggest Boost VHC TID, RD consult, speech eval    Risk Factors:  Parkinson's, Alzheimer's Lewy Body dementia, seizures  Options provided:  -- Moderate Protein Calorie Malnutrition  -- Protein Calorie Malnutrition, Please specify severity  -- Other - I will add my own diagnosis  -- Refer to Clinical " Documentation Reviewer    Query created by: Elin Fisher on 6/25/2024 2:44 PM      Electronically signed by:  NEERAJ HOUSE MD 6/26/2024 6:53 AM

## 2024-06-26 NOTE — CARE PLAN
The patient's goals for the shift include  remaining comfortable throughout shift.    The clinical goals for the shift include See POC      Problem: Pain - Adult  Goal: Verbalizes/displays adequate comfort level or baseline comfort level  Outcome: Progressing     Problem: Safety - Adult  Goal: Free from fall injury  Outcome: Progressing     Problem: Discharge Planning  Goal: Discharge to home or other facility with appropriate resources  Outcome: Progressing     Problem: Chronic Conditions and Co-morbidities  Goal: Patient's chronic conditions and co-morbidity symptoms are monitored and maintained or improved  Outcome: Progressing     Problem: Skin  Goal: Decreased wound size/increased tissue granulation at next dressing change  Outcome: Progressing  Goal: Participates in plan/prevention/treatment measures  Outcome: Progressing  Goal: Prevent/manage excess moisture  Outcome: Progressing  Goal: Prevent/minimize sheer/friction injuries  Outcome: Progressing  Flowsheets (Taken 6/26/2024 0116)  Prevent/minimize sheer/friction injuries: Turn/reposition every 2 hours/use positioning/transfer devices  Goal: Promote/optimize nutrition  Outcome: Progressing  Goal: Promote skin healing  Outcome: Progressing     Problem: Nutrition  Goal: Less than 5 days NPO/clear liquids  Outcome: Progressing  Goal: Oral intake greater than 50%  Outcome: Progressing  Goal: Oral intake greater 75%  Outcome: Progressing  Goal: Consume prescribed supplement  Outcome: Progressing  Goal: Adequate PO fluid intake  Outcome: Progressing  Goal: Nutrition support goals are met within 48 hrs  Outcome: Progressing  Goal: Nutrition support is meeting 75% of nutrient needs  Outcome: Progressing  Goal: Tube feed tolerance  Outcome: Progressing  Goal: BG  mg/dL  Outcome: Progressing  Goal: Lab values WNL  Outcome: Progressing  Goal: Electrolytes WNL  Outcome: Progressing  Goal: Promote healing  Outcome: Progressing  Goal: Maintain stable  weight  Outcome: Progressing  Goal: Reduce weight from edema/fluid  Outcome: Progressing  Goal: Gradual weight gain  Outcome: Progressing  Goal: Improve ostomy output  Outcome: Progressing

## 2024-06-26 NOTE — PROCEDURES
Speech-Language Pathology    Inpatient Modified Barium Swallow Study    Patient Name: Snehal Souza  MRN: [id number]  : 1950  Today's Date: 24  Time Calculation  Start Time: 910  Stop Time: 920  Time Calculation (min): 10 min         Reason for referral: Suspected oral or pharyngeal dysphagia  Patient hx: 74 y.o. female presenting to ED from [facility] after witnessed aspiration event (pills in applesauce) on .  Pulmonology consulted for possible bronchoscopy.  Palliative care consulted for goals of care.   Past Medical History: Lewy body dementia with minimally verbal baseline, remote breast cancer status post left partial mastectomy, coronary artery disease, anxiety on chronic prescription benzodiazepine, seizure disorder, remote COVID in 2022, and cachexia   Respiratory status: Room air  Current diet: NPO pending results of modified barium swallow study; mechanical soft solids and thin liquids prior to admission    FINAL SPEECH RECOMMENDATIONS    DIET: NPO, with consideration for patient/family goals of care  STRATEGIES: - Complete oral care frequently throughout the day  EXERCISES: Effortful swallow, chin tuck against resistance, supraglottic swallow, RMST      Plan:  Treatment/Interventions: Pharyngeal exercises, Compensatory strategy training, Patient/family education  SLP Plan: Skilled SLP warranted  SLP Frequency: 3x per week  Duration: Current admission    Discussed POC: Patient unable to participate  Discussed Risks/Benefits: Patient unable to participate  Patient/Caregiver Agreeable: Patient unable to participate    GOALS (established 24):   1. Patient will tolerate PO trials of ice chips AFTER ORAL CARE without evidence of aspiration-related complications.  2. Patient will complete recommended swallowing exercises (effortful swallow, chin tuck against resistance, supraglottic swallow) for at least 30 repetitions during treatment session given minimal-moderate  cues.  3. Patient will complete respiratory muscle strength training (RMST) at recommended frequency given minimal-moderate cues.    Education provided: Results and recommendations per MBSS were communicated to RN.    Additional consult suggested: Palliative care to meet with patient/family this afternoon.    Repeat study/ dc plan: As indicated    Mechanics of the swallow summary:    ORAL PHASE:  Lip Closure - Escape progressing to mid-chin   Tongue Control During Bolus Hold - Unable to follow commands for bolus hold  Bolus prep/mastication - Mastication not assessed due to severity of deficits  Bolus transport/lingual motion - Slowed Tongue Motion for A-P movement of the bolus   Oral residue - Residue collection on oral structure     PHARYNGEAL PHASE:  Initiation of pharyngeal swallow - Bolus head at pit of pyriforms   Soft palate elevation - No bolus between soft palate/pharyngeal wall   Laryngeal elevation - Minimal superior movement of thyroid cartilage with minimal approximation of arytenoids to epiglottic petiole   Anterior hyoid excursion - Partial anterior movement   Epiglottic movement - No inversion   Laryngeal vestibule closure - Incomplete - narrow column of air/contrast in laryngeal vestibule   Pharyngeal stripping wave -  Absent   Pharyngeal contraction (A/P view) - Not tested       Pharyngoesophageal segment opening - Partial distension/partial duration with partial obstruction of flow of bolus   Tongue base retraction - Wide column of contrast or air between tongue base and pharyngeal wall   Pharyngeal residue - Collection of residue within or on the pharyngeal structures     ESOPHAGEAL PHASE:  Esophageal clearance - Not evaluated        SLP Impressions with severity rating:     Patient presents with severe oropharyngeal dysphagia upon completion of modified barium swallow study this date. Swallowing physiology is detailed above. Impairments most impacting swallowing safety/efficiency included  decreased lingual propulsion, decreased tongue base retraction, decreased hyolaryngeal excursion, absent epiglottic inversion, incomplete laryngeal vestibule closure, and absent pharyngeal stripping wave. Patient demonstrated SILENT aspiration of all consistencies accepted (thin liquid, mildly thick liquid, moderately thick liquid, all via 5mL presentations).  Cued cough was inconsistently elicited upon command, but was ineffective and weak when elicited. Patient did not open lips to accept pudding bolus, despite several attempts. Patient demonstrated moderate oral and pharyngeal residue that was unable to be cleared with several repeat swallows.    AP view was not completed due to patient with decreased level of alertness, as well as significant aspiration already occurred during study.    Per the results of this assessment, unable to recommend a safe PO diet at this time. Recommend consideration for patient/family goals of care regarding ongoing PO intake (hospice meeting planned for later today, per chart). ST to continue to follow during inpatient stay if indicated per goals of care, to provide ongoing education/training in safe swallowing strategies to reduce risk of aspiration, and to direct swallowing exercises targeting deficits identified.    OUTCOME MEASURES:    Functional Oral Intake Scale  Functional Oral Intake Scale: Level 1        nothing by mouth    Rosenbek's Penetration Aspiration Scale  Thin Liquids: 8. SILENT ASPIRATION - contrast passes glottis, visible residue, NO pt response]   During the Swallow  After the Swallow  Nectar Thick Liquids: 8. SILENT ASPIRATION - contrast passes glottis, visible residue, NO pt response]   After the Swallow  Honey Thick Liquids: 8. SILENT ASPIRATION - contrast passes glottis, visible residue, NO pt response]   Before the Swallow  During the Swallow  Puree: Bolus presented but was not accepted by patient  Soft Solids: Not tested  Solids: Not tested

## 2024-06-26 NOTE — PROGRESS NOTES
"Subjective  P still minimally responsive family have a meeting with hospice today  Nursing staff was interviewed  Objectives    Last Recorded Vitals  Blood pressure 155/87, pulse 81, temperature 36.6 °C (97.9 °F), temperature source Temporal, resp. rate 16, height 1.651 m (5' 5\"), weight (!) 38.6 kg (85 lb 1.6 oz), SpO2 96%.    Physical Exam  HENT:      Right Ear: External ear normal.      Left Ear: External ear normal.      Mouth/Throat:      Mouth: Mucous membranes are moist.   Cardiovascular:      Rate and Rhythm: Normal rate and regular rhythm.      Heart sounds: No murmur heard.     No friction rub. No gallop.   Pulmonary:      Effort: No accessory muscle usage or respiratory distress.      Breath sounds: No stridor. No wheezing or rhonchi.   Chest:      Chest wall: No tenderness.   Abdominal:      General: There is no distension.      Palpations: There is no mass.      Tenderness: There is no abdominal tenderness. There is no guarding or rebound.   Musculoskeletal:         General: No deformity or signs of injury.      Cervical back: No rigidity or tenderness. Normal range of motion.      Right lower leg: No edema.      Left lower leg: No edema.   Skin:     Coloration: Skin is not jaundiced or pale.      Findings: No lesion.   Neurological:   Lethargic minimally responsive       Labs    Admission on 06/22/2024   Component Date Value Ref Range Status    WBC 06/22/2024 5.9  4.4 - 11.3 x10*3/uL Final    nRBC 06/22/2024 0.0  0.0 - 0.0 /100 WBCs Final    RBC 06/22/2024 4.60  4.00 - 5.20 x10*6/uL Final    Hemoglobin 06/22/2024 13.7  12.0 - 16.0 g/dL Final    Hematocrit 06/22/2024 42.0  36.0 - 46.0 % Final    MCV 06/22/2024 91  80 - 100 fL Final    MCH 06/22/2024 29.8  26.0 - 34.0 pg Final    MCHC 06/22/2024 32.6  32.0 - 36.0 g/dL Final    RDW 06/22/2024 13.3  11.5 - 14.5 % Final    Platelets 06/22/2024 191  150 - 450 x10*3/uL Final    Neutrophils % 06/22/2024 72.8  40.0 - 80.0 % Final    Immature Granulocytes %, " Automated 06/22/2024 0.2  0.0 - 0.9 % Final    Immature Granulocyte Count (IG) includes promyelocytes, myelocytes and metamyelocytes but does not include bands. Percent differential counts (%) should be interpreted in the context of the absolute cell counts (cells/UL).    Lymphocytes % 06/22/2024 17.7  13.0 - 44.0 % Final    Monocytes % 06/22/2024 7.1  2.0 - 10.0 % Final    Eosinophils % 06/22/2024 1.5  0.0 - 6.0 % Final    Basophils % 06/22/2024 0.7  0.0 - 2.0 % Final    Neutrophils Absolute 06/22/2024 4.33  1.60 - 5.50 x10*3/uL Final    Percent differential counts (%) should be interpreted in the context of the absolute cell counts (cells/uL).    Immature Granulocytes Absolute, Au* 06/22/2024 0.01  0.00 - 0.50 x10*3/uL Final    Lymphocytes Absolute 06/22/2024 1.05  0.80 - 3.00 x10*3/uL Final    Monocytes Absolute 06/22/2024 0.42  0.05 - 0.80 x10*3/uL Final    Eosinophils Absolute 06/22/2024 0.09  0.00 - 0.40 x10*3/uL Final    Basophils Absolute 06/22/2024 0.04  0.00 - 0.10 x10*3/uL Final    Glucose 06/22/2024 91  74 - 99 mg/dL Final    Sodium 06/22/2024 137  136 - 145 mmol/L Final    Potassium 06/22/2024 4.5  3.5 - 5.3 mmol/L Final    MILD HEMOLYSIS DETECTED. The result may be falsely elevated due to hemolysis or other interferents. Clinical correlation is recommended. Repeat testing may be considered.    Chloride 06/22/2024 102  98 - 107 mmol/L Final    Bicarbonate 06/22/2024 25  21 - 32 mmol/L Final    Anion Gap 06/22/2024 15  10 - 20 mmol/L Final    Urea Nitrogen 06/22/2024 17  6 - 23 mg/dL Final    Creatinine 06/22/2024 0.43 (L)  0.50 - 1.05 mg/dL Final    eGFR 06/22/2024 >90  >60 mL/min/1.73m*2 Final    Calculations of estimated GFR are performed using the 2021 CKD-EPI Study Refit equation without the race variable for the IDMS-Traceable creatinine methods.  https://jasn.asnjournals.org/content/early/2021/09/22/ASN.6799667255    Calcium 06/22/2024 9.2  8.6 - 10.3 mg/dL Final    Albumin 06/22/2024 4.0  3.4 -  5.0 g/dL Final    Alkaline Phosphatase 06/22/2024 65  33 - 136 U/L Final    Total Protein 06/22/2024 6.8  6.4 - 8.2 g/dL Final    AST 06/22/2024 18  9 - 39 U/L Final    MILD HEMOLYSIS DETECTED. The result may be falsely elevated due to hemolysis or other interferents. Clinical correlation is recommended. Repeat testing may be considered.    Bilirubin, Total 06/22/2024 0.5  0.0 - 1.2 mg/dL Final    ALT 06/22/2024 <3 (L)  7 - 45 U/L Final    Patients treated with Sulfasalazine may generate falsely decreased results for ALT.    Lactate 06/22/2024 0.7  0.4 - 2.0 mmol/L Final    Blood Culture 06/22/2024 No growth at 3 days   Preliminary    Blood Culture 06/22/2024 No growth at 3 days   Preliminary    POCT pH, Venous 06/22/2024 7.45 (H)  7.33 - 7.43 pH Final    POCT pCO2, Venous 06/22/2024 44  41 - 51 mm Hg Final    POCT pO2, Venous 06/22/2024 46 (H)  35 - 45 mm Hg Final    POCT SO2, Venous 06/22/2024 70  45 - 75 % Final    POCT Oxy Hemoglobin, Venous 06/22/2024 69.3  45.0 - 75.0 % Final    POCT Hematocrit Calculated, Venous 06/22/2024 43.0  36.0 - 46.0 % Final    POCT Sodium, Venous 06/22/2024 134 (L)  136 - 145 mmol/L Final    POCT Potassium, Venous 06/22/2024 4.9  3.5 - 5.3 mmol/L Final    POCT Chloride, Venous 06/22/2024 104  98 - 107 mmol/L Final    POCT Ionized Calicum, Venous 06/22/2024 1.18  1.10 - 1.33 mmol/L Final    POCT Glucose, Venous 06/22/2024 96  74 - 99 mg/dL Final    POCT Lactate, Venous 06/22/2024 1.0  0.4 - 2.0 mmol/L Final    POCT Base Excess, Venous 06/22/2024 5.8 (H)  -2.0 - 3.0 mmol/L Final    POCT HCO3 Calculated, Venous 06/22/2024 30.6 (H)  22.0 - 26.0 mmol/L Final    POCT Hemoglobin, Venous 06/22/2024 14.3  12.0 - 16.0 g/dL Final    POCT Anion Gap, Venous 06/22/2024 4.0 (L)  10.0 - 25.0 mmol/L Final    Patient Temperature 06/22/2024    Final    NOTE: Patient Results are Not Corrected for Temperature    FiO2 06/22/2024 21  % Final    Color, Urine 06/23/2024 Yellow  Light-Yellow, Yellow,  Dark-Yellow Final    Appearance, Urine 06/23/2024 Clear  Clear Final    Specific Gravity, Urine 06/23/2024 1.042 (N)  1.005 - 1.035 Final    pH, Urine 06/23/2024 5.5  5.0, 5.5, 6.0, 6.5, 7.0, 7.5, 8.0 Final    Protein, Urine 06/23/2024 20 (TRACE)  NEGATIVE, 10 (TRACE), 20 (TRACE) mg/dL Final    Glucose, Urine 06/23/2024 500 (3+) (A)  Normal mg/dL Final    Blood, Urine 06/23/2024 NEGATIVE  NEGATIVE Final    Ketones, Urine 06/23/2024 40 (2+) (A)  NEGATIVE mg/dL Final    Bilirubin, Urine 06/23/2024 NEGATIVE  NEGATIVE Final    Urobilinogen, Urine 06/23/2024 Normal  Normal mg/dL Final    Nitrite, Urine 06/23/2024 2+ (A)  NEGATIVE Final    Leukocyte Esterase, Urine 06/23/2024 500 Maikel/µL (A)  NEGATIVE Final    WBC 06/23/2024 8.0  4.4 - 11.3 x10*3/uL Final    nRBC 06/23/2024 0.0  0.0 - 0.0 /100 WBCs Final    RBC 06/23/2024 4.58  4.00 - 5.20 x10*6/uL Final    Hemoglobin 06/23/2024 13.5  12.0 - 16.0 g/dL Final    Hematocrit 06/23/2024 43.1  36.0 - 46.0 % Final    MCV 06/23/2024 94  80 - 100 fL Final    MCH 06/23/2024 29.5  26.0 - 34.0 pg Final    MCHC 06/23/2024 31.3 (L)  32.0 - 36.0 g/dL Final    RDW 06/23/2024 13.4  11.5 - 14.5 % Final    Platelets 06/23/2024 183  150 - 450 x10*3/uL Final    Glucose 06/23/2024 64 (L)  74 - 99 mg/dL Final    Sodium 06/23/2024 139  136 - 145 mmol/L Final    Potassium 06/23/2024 3.9  3.5 - 5.3 mmol/L Final    Chloride 06/23/2024 105  98 - 107 mmol/L Final    Bicarbonate 06/23/2024 23  21 - 32 mmol/L Final    Anion Gap 06/23/2024 15  10 - 20 mmol/L Final    Urea Nitrogen 06/23/2024 14  6 - 23 mg/dL Final    Creatinine 06/23/2024 0.40 (L)  0.50 - 1.05 mg/dL Final    eGFR 06/23/2024 >90  >60 mL/min/1.73m*2 Final    Calculations of estimated GFR are performed using the 2021 CKD-EPI Study Refit equation without the race variable for the IDMS-Traceable creatinine methods.  https://jasn.asnjournals.org/content/early/2021/09/22/ASN.9761317853    Calcium 06/23/2024 9.0  8.6 - 10.3 mg/dL Final    POCT  Glucose 06/23/2024 56 (L)  74 - 99 mg/dL Final    POCT Glucose 06/23/2024 163 (H)  74 - 99 mg/dL Final    Extra Tube 06/24/2024 Hold for add-ons.   Final    Auto resulted.    WBC, Urine 06/23/2024 11-20 (A)  1-5, NONE /HPF Final    RBC, Urine 06/23/2024 1-2  NONE, 1-2, 3-5 /HPF Final    Squamous Epithelial Cells, Urine 06/23/2024 1-9 (SPARSE)  Reference range not established. /HPF Final    Bacteria, Urine 06/23/2024 4+ (A)  NONE SEEN /HPF Final    Mucus, Urine 06/23/2024 FEW  Reference range not established. /LPF Final    Calcium Oxalate Crystals, Urine 06/23/2024 1+  NONE, 1+ /HPF Final    WBC 06/24/2024 13.6 (H)  4.4 - 11.3 x10*3/uL Final    nRBC 06/24/2024 0.0  0.0 - 0.0 /100 WBCs Final    RBC 06/24/2024 4.40  4.00 - 5.20 x10*6/uL Final    Hemoglobin 06/24/2024 13.0  12.0 - 16.0 g/dL Final    Hematocrit 06/24/2024 41.0  36.0 - 46.0 % Final    MCV 06/24/2024 93  80 - 100 fL Final    MCH 06/24/2024 29.5  26.0 - 34.0 pg Final    MCHC 06/24/2024 31.7 (L)  32.0 - 36.0 g/dL Final    RDW 06/24/2024 13.2  11.5 - 14.5 % Final    Platelets 06/24/2024 149 (L)  150 - 450 x10*3/uL Final    Glucose 06/24/2024 109 (H)  74 - 99 mg/dL Final    Sodium 06/24/2024 140  136 - 145 mmol/L Final    Potassium 06/24/2024 3.5  3.5 - 5.3 mmol/L Final    Chloride 06/24/2024 106  98 - 107 mmol/L Final    Bicarbonate 06/24/2024 22  21 - 32 mmol/L Final    Anion Gap 06/24/2024 16  10 - 20 mmol/L Final    Urea Nitrogen 06/24/2024 13  6 - 23 mg/dL Final    Creatinine 06/24/2024 0.47 (L)  0.50 - 1.05 mg/dL Final    eGFR 06/24/2024 >90  >60 mL/min/1.73m*2 Final    Calculations of estimated GFR are performed using the 2021 CKD-EPI Study Refit equation without the race variable for the IDMS-Traceable creatinine methods.  https://jasn.asnjournals.org/content/early/2021/09/22/ASN.7628783113    Calcium 06/24/2024 8.5 (L)  8.6 - 10.3 mg/dL Final    POCT Glucose 06/24/2024 104 (H)  74 - 99 mg/dL Final    Urine Culture 06/23/2024 >100,000 Gram Negative  Bacilli (A)   Preliminary    Coronavirus 2019, PCR 06/24/2024 Not Detected  Not Detected Final    POCT Glucose 06/24/2024 82  74 - 99 mg/dL Final    Urine Culture 06/24/2024 No significant growth   Final    POCT Glucose 06/24/2024 82  74 - 99 mg/dL Final    WBC 06/25/2024 10.0  4.4 - 11.3 x10*3/uL Final    nRBC 06/25/2024 0.0  0.0 - 0.0 /100 WBCs Final    RBC 06/25/2024 4.16  4.00 - 5.20 x10*6/uL Final    Hemoglobin 06/25/2024 12.4  12.0 - 16.0 g/dL Final    Hematocrit 06/25/2024 38.1  36.0 - 46.0 % Final    MCV 06/25/2024 92  80 - 100 fL Final    MCH 06/25/2024 29.8  26.0 - 34.0 pg Final    MCHC 06/25/2024 32.5  32.0 - 36.0 g/dL Final    RDW 06/25/2024 13.5  11.5 - 14.5 % Final    Platelets 06/25/2024 148 (L)  150 - 450 x10*3/uL Final    Glucose 06/25/2024 79  74 - 99 mg/dL Final    Sodium 06/25/2024 143  136 - 145 mmol/L Final    Potassium 06/25/2024 3.9  3.5 - 5.3 mmol/L Final    Chloride 06/25/2024 107  98 - 107 mmol/L Final    Bicarbonate 06/25/2024 26  21 - 32 mmol/L Final    Anion Gap 06/25/2024 14  10 - 20 mmol/L Final    Urea Nitrogen 06/25/2024 11  6 - 23 mg/dL Final    Creatinine 06/25/2024 0.38 (L)  0.50 - 1.05 mg/dL Final    eGFR 06/25/2024 >90  >60 mL/min/1.73m*2 Final    Calculations of estimated GFR are performed using the 2021 CKD-EPI Study Refit equation without the race variable for the IDMS-Traceable creatinine methods.  https://jasn.asnjournals.org/content/early/2021/09/22/ASN.0274820967    Calcium 06/25/2024 8.4 (L)  8.6 - 10.3 mg/dL Final    POCT Glucose 06/25/2024 75  74 - 99 mg/dL Final    POCT Glucose 06/25/2024 62 (L)  74 - 99 mg/dL Final    POCT Glucose 06/25/2024 100 (H)  74 - 99 mg/dL Final    Ventricular Rate 06/22/2024 84  BPM Preliminary    Atrial Rate 06/22/2024 84  BPM Preliminary    MD Interval 06/22/2024 172  ms Preliminary    QRS Duration 06/22/2024 78  ms Preliminary    QT Interval 06/22/2024 418  ms Preliminary    QTC Calculation(Bazett) 06/22/2024 493  ms Preliminary    P  Axis 06/22/2024 85  degrees Preliminary    R Axis 06/22/2024 54  degrees Preliminary    T Axis 06/22/2024 100  degrees Preliminary    QRS Count 06/22/2024 14  beats Preliminary    Q Onset 06/22/2024 220  ms Preliminary    P Onset 06/22/2024 134  ms Preliminary    P Offset 06/22/2024 179  ms Preliminary    T Offset 06/22/2024 429  ms Preliminary    QTC Fredericia 06/22/2024 467  ms Preliminary    POCT Glucose 06/25/2024 89  74 - 99 mg/dL Final    WBC 06/26/2024 10.4  4.4 - 11.3 x10*3/uL Final    nRBC 06/26/2024 0.0  0.0 - 0.0 /100 WBCs Final    RBC 06/26/2024 4.19  4.00 - 5.20 x10*6/uL Final    Hemoglobin 06/26/2024 12.7  12.0 - 16.0 g/dL Final    Hematocrit 06/26/2024 38.9  36.0 - 46.0 % Final    MCV 06/26/2024 93  80 - 100 fL Final    MCH 06/26/2024 30.3  26.0 - 34.0 pg Final    MCHC 06/26/2024 32.6  32.0 - 36.0 g/dL Final    RDW 06/26/2024 13.4  11.5 - 14.5 % Final    Platelets 06/26/2024 147 (L)  150 - 450 x10*3/uL Final    Glucose 06/26/2024 87  74 - 99 mg/dL Final    Sodium 06/26/2024 139  136 - 145 mmol/L Final    Potassium 06/26/2024 3.2 (L)  3.5 - 5.3 mmol/L Final    Chloride 06/26/2024 104  98 - 107 mmol/L Final    Bicarbonate 06/26/2024 23  21 - 32 mmol/L Final    Anion Gap 06/26/2024 15  10 - 20 mmol/L Final    Urea Nitrogen 06/26/2024 6  6 - 23 mg/dL Final    Creatinine 06/26/2024 0.28 (L)  0.50 - 1.05 mg/dL Final    eGFR 06/26/2024 >90  >60 mL/min/1.73m*2 Final    Calculations of estimated GFR are performed using the 2021 CKD-EPI Study Refit equation without the race variable for the IDMS-Traceable creatinine methods.  https://jasn.asnjournals.org/content/early/2021/09/22/ASN.7806575052    Calcium 06/26/2024 8.1 (L)  8.6 - 10.3 mg/dL Final    POCT Glucose 06/24/2024 85  74 - 99 mg/dL Final    POCT Glucose 06/25/2024 92  74 - 99 mg/dL Final         Imaging     Electrocardiogram, 12-lead PRN ACS symptoms  Normal sinus rhythm with sinus arrhythmia  Anteroseptal infarct (cited on or before  22-JUN-2024)  Abnormal ECG  When compared with ECG of 18-NOV-2022 08:47,  [place] interval has decreased  Serial changes of evolving Anteroseptal infarct Present       Patient Active Problem List   Diagnosis    Acute hypoxic respiratory failure (Multi)    Aspiration into airway         Assessment/Plan   Principal Problem:    Acute hypoxic respiratory failure (Multi)  Active Problems:    Aspiration into airway      Poor prognosis patient has severe dementia with acute metabolic encephalopathy status post aspiration patient on antibiotic family will meet with hospice today patient still not eating failed swallowing evaluation will have modified barium swallow             NEERAJ Palomo MD

## 2024-06-27 VITALS
RESPIRATION RATE: 16 BRPM | SYSTOLIC BLOOD PRESSURE: 154 MMHG | OXYGEN SATURATION: 94 % | WEIGHT: 85.1 LBS | BODY MASS INDEX: 14.18 KG/M2 | DIASTOLIC BLOOD PRESSURE: 91 MMHG | TEMPERATURE: 98.2 F | HEART RATE: 87 BPM | HEIGHT: 65 IN

## 2024-06-27 LAB
ANION GAP SERPL CALC-SCNC: 13 MMOL/L (ref 10–20)
BACTERIA BLD CULT: NORMAL
BACTERIA BLD CULT: NORMAL
BACTERIA UR CULT: ABNORMAL
BUN SERPL-MCNC: 5 MG/DL (ref 6–23)
CALCIUM SERPL-MCNC: 8.1 MG/DL (ref 8.6–10.3)
CHLORIDE SERPL-SCNC: 104 MMOL/L (ref 98–107)
CO2 SERPL-SCNC: 26 MMOL/L (ref 21–32)
CREAT SERPL-MCNC: 0.27 MG/DL (ref 0.5–1.05)
EGFRCR SERPLBLD CKD-EPI 2021: >90 ML/MIN/1.73M*2
ERYTHROCYTE [DISTWIDTH] IN BLOOD BY AUTOMATED COUNT: 13.3 % (ref 11.5–14.5)
GLUCOSE BLD MANUAL STRIP-MCNC: 89 MG/DL (ref 74–99)
GLUCOSE SERPL-MCNC: 85 MG/DL (ref 74–99)
HCT VFR BLD AUTO: 35.7 % (ref 36–46)
HGB BLD-MCNC: 11.9 G/DL (ref 12–16)
MCH RBC QN AUTO: 29.8 PG (ref 26–34)
MCHC RBC AUTO-ENTMCNC: 33.3 G/DL (ref 32–36)
MCV RBC AUTO: 89 FL (ref 80–100)
NRBC BLD-RTO: 0 /100 WBCS (ref 0–0)
PLATELET # BLD AUTO: 173 X10*3/UL (ref 150–450)
POTASSIUM SERPL-SCNC: 3.2 MMOL/L (ref 3.5–5.3)
RBC # BLD AUTO: 4 X10*6/UL (ref 4–5.2)
SODIUM SERPL-SCNC: 140 MMOL/L (ref 136–145)
WBC # BLD AUTO: 7.1 X10*3/UL (ref 4.4–11.3)

## 2024-06-27 PROCEDURE — 85027 COMPLETE CBC AUTOMATED: CPT | Performed by: NURSE PRACTITIONER

## 2024-06-27 PROCEDURE — 2500000004 HC RX 250 GENERAL PHARMACY W/ HCPCS (ALT 636 FOR OP/ED): Performed by: NURSE PRACTITIONER

## 2024-06-27 PROCEDURE — 80051 ELECTROLYTE PANEL: CPT | Performed by: NURSE PRACTITIONER

## 2024-06-27 PROCEDURE — 82947 ASSAY GLUCOSE BLOOD QUANT: CPT

## 2024-06-27 PROCEDURE — 2500000001 HC RX 250 WO HCPCS SELF ADMINISTERED DRUGS (ALT 637 FOR MEDICARE OP): Performed by: NURSE PRACTITIONER

## 2024-06-27 PROCEDURE — 2500000002 HC RX 250 W HCPCS SELF ADMINISTERED DRUGS (ALT 637 FOR MEDICARE OP, ALT 636 FOR OP/ED): Performed by: NURSE PRACTITIONER

## 2024-06-27 PROCEDURE — 2500000004 HC RX 250 GENERAL PHARMACY W/ HCPCS (ALT 636 FOR OP/ED): Performed by: INTERNAL MEDICINE

## 2024-06-27 RX ORDER — HYOSCYAMINE SULFATE 0.125 MG
0.12 TABLET ORAL EVERY 4 HOURS PRN
Qty: 18 TABLET | Refills: 0 | Status: SHIPPED | OUTPATIENT
Start: 2024-06-27 | End: 2024-06-30

## 2024-06-27 RX ORDER — POTASSIUM CHLORIDE 14.9 MG/ML
20 INJECTION INTRAVENOUS
Status: COMPLETED | OUTPATIENT
Start: 2024-06-27 | End: 2024-06-27

## 2024-06-27 RX ORDER — LORAZEPAM 0.5 MG/1
0.5 TABLET ORAL EVERY 4 HOURS PRN
Qty: 18 TABLET | Refills: 0 | Status: SHIPPED | OUTPATIENT
Start: 2024-06-27 | End: 2024-06-30

## 2024-06-27 RX ORDER — MORPHINE SULFATE 20 MG/ML
5 SOLUTION ORAL EVERY 2 HOUR PRN
Qty: 30 ML | Refills: 0 | Status: SHIPPED | OUTPATIENT
Start: 2024-06-27 | End: 2024-06-30

## 2024-06-27 RX ADMIN — Medication 10 ML: at 06:25

## 2024-06-27 RX ADMIN — Medication 10 ML: at 15:00

## 2024-06-27 RX ADMIN — DIAZEPAM 2 MG: 10 INJECTION, SOLUTION INTRAMUSCULAR; INTRAVENOUS at 08:40

## 2024-06-27 RX ADMIN — POTASSIUM CHLORIDE 20 MEQ: 14.9 INJECTION, SOLUTION INTRAVENOUS at 08:39

## 2024-06-27 RX ADMIN — POTASSIUM CHLORIDE 20 MEQ: 14.9 INJECTION, SOLUTION INTRAVENOUS at 11:23

## 2024-06-27 RX ADMIN — FLUTICASONE PROPIONATE 2 SPRAY: 50 SPRAY, METERED NASAL at 09:05

## 2024-06-27 RX ADMIN — AMPICILLIN SODIUM AND SULBACTAM SODIUM 3 G: 2; 1 INJECTION, POWDER, FOR SOLUTION INTRAMUSCULAR; INTRAVENOUS at 05:55

## 2024-06-27 RX ADMIN — SODIUM CHLORIDE, POTASSIUM CHLORIDE, SODIUM LACTATE AND CALCIUM CHLORIDE 50 ML/HR: 600; 310; 30; 20 INJECTION, SOLUTION INTRAVENOUS at 02:03

## 2024-06-27 RX ADMIN — CHLORHEXIDINE GLUCONATE 0.12% ORAL RINSE 15 ML: 1.2 LIQUID ORAL at 08:40

## 2024-06-27 ASSESSMENT — COGNITIVE AND FUNCTIONAL STATUS - GENERAL
DRESSING REGULAR UPPER BODY CLOTHING: TOTAL
MOBILITY SCORE: 6
TOILETING: TOTAL
MOVING TO AND FROM BED TO CHAIR: TOTAL
EATING MEALS: TOTAL
DRESSING REGULAR LOWER BODY CLOTHING: TOTAL
MOVING FROM LYING ON BACK TO SITTING ON SIDE OF FLAT BED WITH BEDRAILS: TOTAL
PERSONAL GROOMING: TOTAL
CLIMB 3 TO 5 STEPS WITH RAILING: TOTAL
DRESSING REGULAR UPPER BODY CLOTHING: TOTAL
DRESSING REGULAR LOWER BODY CLOTHING: TOTAL
EATING MEALS: TOTAL
PERSONAL GROOMING: TOTAL
MOBILITY SCORE: 6
HELP NEEDED FOR BATHING: TOTAL
TOILETING: TOTAL
CLIMB 3 TO 5 STEPS WITH RAILING: TOTAL
HELP NEEDED FOR BATHING: TOTAL
DAILY ACTIVITIY SCORE: 6
WALKING IN HOSPITAL ROOM: TOTAL
DAILY ACTIVITIY SCORE: 6
STANDING UP FROM CHAIR USING ARMS: TOTAL
TURNING FROM BACK TO SIDE WHILE IN FLAT BAD: TOTAL
WALKING IN HOSPITAL ROOM: TOTAL
STANDING UP FROM CHAIR USING ARMS: TOTAL
MOVING TO AND FROM BED TO CHAIR: TOTAL
MOVING FROM LYING ON BACK TO SITTING ON SIDE OF FLAT BED WITH BEDRAILS: TOTAL
TURNING FROM BACK TO SIDE WHILE IN FLAT BAD: TOTAL

## 2024-06-27 ASSESSMENT — PAIN SCALES - PAIN ASSESSMENT IN ADVANCED DEMENTIA (PAINAD)
BREATHING: NORMAL
BODYLANGUAGE: RELAXED
CONSOLABILITY: NO NEED TO CONSOLE
FACIALEXPRESSION: SMILING OR INEXPRESSIVE
TOTALSCORE: 1
NEGVOCALIZATION: OCCASIONAL MOAN/GROAN, LOW SPEECH, NEGATIVE/DISAPPROVING QUALITY

## 2024-06-27 NOTE — PROGRESS NOTES
"Nutrition Follow Up Assessment:   Nutrition Assessment    Reason for Assessment: Admission nursing screening    Patient History: Snehal Souza is a 74 y.o. female presenting to ED from [facility] after witnessed aspiration on 6/22.  Pulmonology consulted for possible bronchoscopy.  Palliative care consulted for goals of care.  Pt has not been able to pass a swallow eval yet from  due to lethargy.     6/27/24 - Chart reviewed and events noted - pt continues with NPO status and unable to pass swallow eval.  Per palliative notation hospice meeting was scheduled for yesterday however had to be rescheduled to today with attending recommending hospice due to poor prognosis.    Past Medical History: Lewy body dementia with minimally verbal baseline, remote breast cancer status post left partial mastectomy, coronary artery disease, anxiety on chronic prescription benzodiazepine, seizure disorder, remote COVID in January 2022, and cachexia     Nutrition History:  Energy Intake:  (NPO)  Food and Nutrient History: RD called and spoke to nursing from [facility] who noted that patient had been on a [facility] soft diet with thin liquids.  No current nutrition supplements were ordered as patient had been maintaining weight in the # range.  She typically eats small portions and does not say a lot but is able to answer questions.  Food Allergies/Intolerances:  None  GI Symptoms: None  Oral Problems:  NPO       Current Diet: NPO Diet; Effective now    Anthropometrics:  Height: 165.1 cm (5' 5\")   Weight: (!) 38.6 kg (85 lb 1.6 oz)   BMI (Calculated): 14.16             Weight Change %:  Weight History / % Weight Change: Per SNF weights, November 2023: 102#, December: 97#,  Weight had been maintaining in the upper 90s per archives.  Significant Weight Loss: Yes ((-13.3% in the past 3 months))  Interpretation of Weight Loss: >7.5% in 3 months    Pain Assessment: PAINAD  0-10 (Numeric) Pain Score: 0 - No pain      Nutrition " Significant Labs:  BMP Trend:   Results from last 7 days   Lab Units 06/27/24  0624 06/26/24  0545 06/25/24  0609 06/24/24  0647   GLUCOSE mg/dL 85 87 79 109*   CALCIUM mg/dL 8.1* 8.1* 8.4* 8.5*   SODIUM mmol/L 140 139 143 140   POTASSIUM mmol/L 3.2* 3.2* 3.9 3.5   CO2 mmol/L 26 23 26 22   CHLORIDE mmol/L 104 104 107 106   BUN mg/dL 5* 6 11 13   CREATININE mg/dL 0.27* 0.28* 0.38* 0.47*        Nutrition Specific Medications:  Scheduled medications  ampicillin-sulbactam, 3 g, intravenous, q6h  [Held by provider] carbidopa-levodopa, 1 tablet, oral, TID  chlorhexidine, 15 mL, Mouth/Throat, BID  diazePAM, 2 mg, intravenous, BID  [Held by provider] docusate sodium, 100 mg, oral, BID  fluticasone, 2 spray, Each Nostril, Daily  [Held by provider] memantine, 10 mg, oral, BID  potassium chloride, 20 mEq, intravenous, q2h  sodium chloride 0.9%, 10 mL, intravenous, q8h ANGIE      Continuous medications  lactated Ringer's, 50 mL/hr, Last Rate: 50 mL/hr (06/27/24 0618)        Nutrition Focused Physical Exam Findings:  defer: not appropriate at this time, below information from previous assessment  Subcutaneous Fat Loss:   Orbital Fat Pads: Mild-Moderate (slight dark circles and slight hollowing)  Buccal Fat Pads: Mild-Moderate (flat cheeks, minimal bounce)  Triceps: Defer  Ribs: Defer  Muscle Wasting:  Temporalis: Mild-Moderate (slight depression)  Pectoralis (Clavicular Region): Mild-Moderate (some protrusion of clavicle)  Deltoid/Trapezius: Mild-Moderate (slight protrusion of acromion process)  Interosseous: Defer  Trapezius/Infraspinatus/Supraspinatus (Scapular Region): Defer  Quadriceps: Defer  Gastrocnemius: Defer  Edema:  Edema: none  Physical Findings:  Hair: Negative  Eyes: Negative  Mouth: Negative  Nails: Negative  Skin: Negative     Estimated Needs:   Total Energy Estimated Needs (kCal):  (1200-1350kcal or 30-35kcal/kg)  Total Protein Estimated Needs (g):  (46-58g or 1.2-1.5g/kg)  Total Fluid Estimated Needs (mL):   (1ml/kcal or per MD recs)          Nutrition Diagnosis   Malnutrition Diagnosis  Patient has Malnutrition Diagnosis: Yes  Diagnosis Status: Ongoing  Malnutrition Diagnosis: Moderate malnutrition related to chronic disease or condition  As Evidenced by: acute on chronic nutritional stress with aspiration episode and chronic stress of lewy body dementia with indicators including PO intakes meeting <75% of needs for > 1 month, weight record indicating loss >7.5% within the past 3 months, and NFPE showing signs of moderate muscle wasting/fat loss.            Nutrition Interventions/Recommendations         Nutrition Prescription:  Individualized Nutrition Prescription Provided for : Continue NPO status and advanceif able to pass swallow eval for safe PO intake  Enteral nutrition appears to not be indicated as part of plan of care, if this were to change please see recs below        Nutrition Interventions:   Food and/or Nutrient Delivery Interventions  Interventions: Enteral intake, Medical food supplement  Enteral Intake: Other (Comment)  Goal: if indicated per plan of care would suggest corpak placement with the following EN regimen: Jevity 1.5 at goal rate of 35ml/hr (initiate at 10ml/hr and advance by 10ml/hr every 6-8hrs with tolerance to goal); goal rate would provide 1260kcal, 57g pro, and 650ml free water; Suggest free water flush of 105ml 6x/day to provide total fluid volume of 1280ml free water  Medical Food Supplement: Commercial beverage  Goal: If able to start PO diet would suggest 4oz Boost VHC TID with meals (4oz provides 265kcal, 11g pro)    Coordination of Nutrition Care by a Nutrition Professional  Collaboration and Referral of Nutrition Care: Collaboration by nutrition professional with other providers  Goal: Maintain communication with IDT as appropriate       Nutrition Monitoring and Evaluation   Food/Nutrient Related History Monitoring  Monitoring and Evaluation Plan: Enteral and parenteral  nutrition intake  Enteral and Parenteral Nutrition Intake: Enteral nutrition intake  Criteria: Pt will tolerate goal rate within 24-48hrs if EN support warranted as part of plan of care         Biochemical Data, Medical Tests and Procedures  Monitoring and Evaluation Plan: Electrolyte/renal panel  Electrolyte and Renal Panel: Phosphorus, Magnesium, Chloride, Sodium, BUN  Criteria: Maintain within acceptable range            Time Spent/Follow-up Reminder:   Time Spent (min): 45 minutes  Last Date of Nutrition Visit: 06/27/24  Nutrition Follow-Up Needed?: 3-5 days  Follow up Comment: DOUG pearce in - hospice meeting today

## 2024-06-27 NOTE — PROGRESS NOTES
Met with daughters, Humaira and Brook. Reviewed Hospice philosophy and scope of services. All hospice documents were signed and faxed to [facility]. Report called and Physicians to pick pt up at 1800.     Thank you,  Josephine Moore RN  [facility]

## 2024-06-27 NOTE — CARE PLAN
Nursing secured messaged [facility] provider that the patient needs hard copy of prescriptions for the nursing home of end of life medications including Roxanol, Levsin, and Ativan for a 3 day supply. I have been uninvolved in this patient's care up to this point. The attending physician has given the order for the patient to be discharged from the hospital to [facility]. The attending physician has left the hospital today and has asked for my assistance with the patient's discharge medications.  The patient is being provided discharge orders in their discharge packet as instructed by the attending.

## 2024-06-27 NOTE — CARE PLAN
The patient's goals for the shift include      The clinical goals for the shift include pt will remain  safe this shift  .    Problem: Safety - Adult  Goal: Free from fall injury  Outcome: Progressing     Problem: Chronic Conditions and Co-morbidities  Goal: Patient's chronic conditions and co-morbidity symptoms are monitored and maintained or improved  Outcome: Progressing     Problem: Skin  Goal: Decreased wound size/increased tissue granulation at next dressing change  Outcome: Progressing     Problem: Skin  Goal: Participates in plan/prevention/treatment measures  Outcome: Progressing     Problem: Skin  Goal: Prevent/manage excess moisture  Outcome: Progressing

## 2024-06-27 NOTE — PROGRESS NOTES
"Subjective  Patient failed modified barium swallow related to her lethargy patient has poor prognosis  Nursing staff was interviewed  Objectives    Last Recorded Vitals  Blood pressure 148/87, pulse 82, temperature 36.6 °C (97.9 °F), temperature source Temporal, resp. rate 14, height 1.651 m (5' 5\"), weight (!) 38.6 kg (85 lb 1.6 oz), SpO2 95%.    Physical Exam  HENT:      Right Ear: External ear normal.      Left Ear: External ear normal.      Mouth/Throat:      Mouth: Mucous membranes are moist.   Cardiovascular:      Rate and Rhythm: Normal rate and regular rhythm.      Heart sounds: No murmur heard.     No friction rub. No gallop.   Pulmonary:      Effort: No accessory muscle usage or respiratory distress.      Breath sounds: No stridor. No wheezing or rhonchi.   Chest:      Chest wall: No tenderness.   Abdominal:      General: There is no distension.      Palpations: There is no mass.      Tenderness: There is no abdominal tenderness. There is no guarding or rebound.   Musculoskeletal:         General: No deformity or signs of injury.      Cervical back: No rigidity or tenderness. Normal range of motion.      Right lower leg: No edema.      Left lower leg: No edema.   Skin:     Coloration: Skin is not jaundiced or pale.      Findings: No lesion.   Neurological:   Lethargy minimally responsive     Labs    No results displayed because visit has over 200 results.            Imaging     FL modified barium swallow study  Narrative: Interpreted By:  John Dan and Hetrick Bethany   STUDY:  FL MODIFIED BARIUM SWALLOW STUDY;; 6/26/2024 9:26 am      INDICATION:  Signs/Symptoms:Coughing after 1 out of 3 ice chips; up to 4  pharyngeal swallows per pureed bolus, followed by weak, congested  coughing.      COMPARISON:  CT head 02/04/2024 and CT cervical spine 11/18/2022.      ACCESSION NUMBER(S):  [id number]      ORDERING CLINICIAN:  NEERAJ HOUSE      TECHNIQUE:  MBSS completed. Informed verbal consent obtained " prior to completion  of exam. Trials of thin liquid, mildly/nectar thick liquid, and  moderately/honey thick liquid were given. Fluoroscopy time :  2  minutes.      SLP: Shavonne Roberts MS, CCC-SLP  Phone/Pager: Epic Secure Chat/Haiku Messenger      SPEECH FINDINGS:  Reason for referral: Suspected oral or pharyngeal dysphagia  Patient hx: 74 y.o. female presenting to ED from [facility] after witnessed aspiration event (pills in applesauce) on  6/22.  Pulmonology consulted for possible bronchoscopy.  Palliative  care consulted for goals of care. Past Medical History: Lewy body  dementia with minimally verbal baseline, remote breast cancer status  post left partial mastectomy, coronary artery disease, anxiety on  chronic prescription benzodiazepine, seizure disorder, remote COVID  in January 2022, and cachexia Respiratory status: Room air  Current diet: NPO pending results of modified barium swallow study;  mechanical soft solids and thin liquids prior to admission      FINAL SPEECH RECOMMENDATIONS      DIET: NPO, with consideration for patient/family goals of care  STRATEGIES: - Complete oral care frequently throughout the day  EXERCISES: Effortful swallow, chin tuck against resistance,  supraglottic swallow, RMST          Plan:  Treatment/Interventions: Pharyngeal exercises, Compensatory strategy  training, Patient/family education SLP Plan: Skilled SLP warranted  SLP Frequency: 3x per week  Duration: Current admission      Discussed POC: Patient unable to participate  Discussed Risks/Benefits: Patient unable to participate  Patient/Caregiver Agreeable: Patient unable to participate      GOALS (established 06/26/24):  1. Patient will tolerate PO trials of ice chips AFTER ORAL CARE  without evidence of aspiration-related complications.  2. Patient will complete recommended swallowing exercises (effortful  swallow, chin tuck against resistance, supraglottic swallow) for at  least 30 repetitions during  treatment session given minimal-moderate  cues.  3. Patient will complete respiratory muscle strength training (RMST)  at recommended frequency given minimal-moderate cues.      Education provided: Results and recommendations per MBSS were  communicated to RN.      Additional consult suggested: Palliative care to meet with  patient/family this afternoon.      Repeat study/ dc plan: As indicated      Mechanics of the swallow summary:      ORAL PHASE:  Lip Closure - Escape progressing to mid-chin  Tongue Control During Bolus Hold - Unable to follow commands for  bolus hold Bolus prep/mastication - Mastication not assessed due to  severity of deficits Bolus transport/lingual motion - Slowed Tongue  Motion for A-P movement of the bolus Oral residue - Residue  collection on oral structure      PHARYNGEAL PHASE:  Initiation of pharyngeal swallow - Bolus head at pit of pyriforms  Soft palate elevation - No bolus between soft palate/pharyngeal wall  Laryngeal elevation - Minimal superior movement of thyroid cartilage  with minimal approximation of arytenoids to epiglottic petiole  Anterior hyoid excursion - Partial anterior movement Epiglottic  movement - No inversion Laryngeal vestibule closure - Incomplete -  narrow column of air/contrast in laryngeal vestibule Pharyngeal  stripping wave -  Absent Pharyngeal contraction (A/P view) - Not  tested Pharyngoesophageal segment opening - Partial  distension/partial duration with partial obstruction of flow of bolus  Tongue base retraction - Wide column of contrast or air between  tongue base and pharyngeal wall Pharyngeal residue - Collection of  residue within or on the pharyngeal structures      ESOPHAGEAL PHASE:  Esophageal clearance - Not evaluated          SLP Impressions with severity rating:      Patient presents with severe oropharyngeal dysphagia upon completion  of modified barium swallow study this date. Swallowing physiology is  detailed above. Impairments most  impacting swallowing  safety/efficiency included decreased lingual propulsion, decreased  tongue base retraction, decreased hyolaryngeal excursion, absent  epiglottic inversion, incomplete laryngeal vestibule closure, and  absent pharyngeal stripping wave. Patient demonstrated SILENT  aspiration of all consistencies accepted (thin liquid, mildly thick  liquid, moderately thick liquid, all via 5mL presentations).  Cued  cough was inconsistently elicited upon command, but was ineffective  and weak when elicited. Patient did not open lips to accept pudding  bolus, despite several attempts. Patient demonstrated moderate oral  and pharyngeal residue that was unable to be cleared with several  repeat swallows.      AP view was not completed due to patient with decreased level of  alertness, as well as significant aspiration already occurred during  study.      Per the results of this assessment, unable to recommend a safe PO  diet at this time. Recommend consideration for patient/family goals  of care regarding ongoing PO intake (hospice meeting planned for  later today, per chart). ST to continue to follow during inpatient  stay if indicated per goals of care, to provide ongoing  education/training in safe swallowing strategies to reduce risk of  aspiration, and to direct swallowing exercises targeting deficits  identified.      OUTCOME MEASURES:      Functional Oral Intake Scale  Functional Oral Intake Scale: Level 1        nothing by mouth      Rosenbek's Penetration Aspiration Scale  Thin Liquids: 8. SILENT ASPIRATION - contrast passes glottis, visible  residue, NO pt response During the Swallow  After the Swallow  Nectar Thick Liquids: 8. SILENT ASPIRATION - contrast passes glottis,  visible residue, NO pt response After the Swallow  Honey Thick Liquids: 8. SILENT ASPIRATION - contrast passes glottis,  visible residue, NO pt response Before the Swallow  During the Swallow  Puree: Bolus presented but was not accepted  by patient  Soft Solids: Not tested  Solids: Not tested      Speech Therapy section of this report signed by Shavonne Roberts MS,  CCC-SLP on 6/26/2024 at 10:02 am.      RADIOLOGY FINDINGS:  There are no visualized acute osseous changes noting that the  provided images are below diagnostic resolution. Please see above for  speech language pathology recommendations.      Radiology section of this report signed by John Dan MD.      Impression: There are imaging findings of severe oropharyngeal dysphagia. Please  see speech language pathology findings and recommendations as above.      MACRO:  None      Signed by: John Dan 6/26/2024 10:22 AM  Dictation workstation:   [id number]       Patient Active Problem List   Diagnosis    Acute hypoxic respiratory failure (Multi)    Aspiration into airway         Assessment/Plan   Principal Problem:    Acute hypoxic respiratory failure (Multi)  Active Problems:    Aspiration into airway      Waiting for family decision about hospice care patient is minimally responsive lethargic not eating not drinking continue with oxygen continue with antibiotic treatment         NEERAJ Palomo MD

## 2024-06-27 NOTE — NURSING NOTE
No acute changes this shift.  Patient alert to person only,  Assisted with turning and repositioning every 2 hrs.  Mouth care provided.  Patient has bed alarm on and safety maintained.

## 2024-06-27 NOTE — NURSING NOTE
0800 pt resting comfortably in bed, easy to arouse. Pt repositioned for safety and comfort. Bed alarm maintained. Call light w/I reach.  0815 Dr Palomo at bedside.   1350 Hospice nurse at bedside.   1820 mouth care done and pt repositioned for safety and comfort. IV removed. Awaiting transport.

## 2024-06-27 NOTE — NURSING NOTE
Plan to meet with pts daughters at 1pm today for goals of care.  Hospice meeting to follow at 130p.  Attending rounded this morning, states pt has poor prognosis and he recommends hospice- no further interventions. Plan to discuss this with pts daughters.     3P  Hospice consents signed with plan for pt to discharge back tot he [place] [facility]. Hospice RN to arrange discharge/transport.

## 2024-06-28 ENCOUNTER — NURSING HOME VISIT (OUTPATIENT)
Dept: POST ACUTE CARE | Facility: EXTERNAL LOCATION | Age: 74
End: 2024-06-28
Payer: COMMERCIAL

## 2024-06-28 VITALS
RESPIRATION RATE: 12 BRPM | SYSTOLIC BLOOD PRESSURE: 122 MMHG | HEART RATE: 50 BPM | DIASTOLIC BLOOD PRESSURE: 72 MMHG | OXYGEN SATURATION: 97 % | TEMPERATURE: 97.4 F

## 2024-06-28 DIAGNOSIS — R13.12 DYSPHAGIA, OROPHARYNGEAL: ICD-10-CM

## 2024-06-28 DIAGNOSIS — G20.A1 PARKINSON'S DISEASE WITHOUT DYSKINESIA OR FLUCTUATING MANIFESTATIONS (MULTI): ICD-10-CM

## 2024-06-28 DIAGNOSIS — F03.90 DEMENTIA WITHOUT BEHAVIORAL DISTURBANCE (MULTI): ICD-10-CM

## 2024-06-28 DIAGNOSIS — Z51.5 HOSPICE CARE: Primary | ICD-10-CM

## 2024-06-28 DIAGNOSIS — T17.908D ASPIRATION INTO AIRWAY, SUBSEQUENT ENCOUNTER: ICD-10-CM

## 2024-06-28 DIAGNOSIS — R64 CACHEXIA (MULTI): ICD-10-CM

## 2024-06-28 PROCEDURE — 99310 SBSQ NF CARE HIGH MDM 45: CPT | Performed by: NURSE PRACTITIONER

## 2024-06-28 NOTE — LETTER
Patient: Snehal Souza  : 1950    Encounter Date: 2024    Subjective  Snehal Souza is a 74 y.o. female returns from hospitalization due to hypoxia, aspiration pna  HPI She was taking her medications in applesauce when she choked, coughed and developed hypoxia prompting hospitalization where she was found to have aspiration pna.  MBS was completed showing severe oropharyngeal dysphagia.  Goals of care was discussed with family and decision was made for hospice care.    Her routine medications were discontinued.  She is resting in bed, sleeping but arousable.    Denies any complaints when asked.    Labs:    WBC: 7.1  Hgb: 11.9  Hct: 35.7  Platelet: 173    Na: 140  K: 3.2  Cl: 104  Co2: 26  BUN: 5  Creatinine: 0.27  GFR: > 90          Review of Systems   Constitutional:         Difficult to obtain due to dementia, denies any complaints when asked.        Objective  /72   Pulse 50   Temp 36.3 °C (97.4 °F)   Resp 12   SpO2 97%     Physical Exam  Constitutional:       General: She is not in acute distress.     Comments: Frail, cachectic elderly female resting in bed,  no apparent distress   HENT:      Head: Normocephalic and atraumatic.   Eyes:      Conjunctiva/sclera: Conjunctivae normal.   Cardiovascular:      Rate and Rhythm: Normal rate and regular rhythm.   Pulmonary:      Effort: Pulmonary effort is normal. No respiratory distress.      Breath sounds: Normal breath sounds.   Abdominal:      General: Bowel sounds are normal. There is no distension.      Palpations: Abdomen is soft.      Tenderness: There is no abdominal tenderness.   Musculoskeletal:      Right lower leg: No edema.      Left lower leg: No edema.      Comments: diffusely decreased muscle mass     Skin:     General: Skin is warm and dry.   Neurological:      Mental Status: She is alert.      Comments: Oriented times 1   Psychiatric:         Mood and Affect: Mood normal.         Behavior: Behavior normal.          Assessment/Plan  Problem List Items Addressed This Visit       Dementia without behavioral disturbance (Multi)     Oriented times 1, will open eyes to name, minimal verbalization.   staff to monitor and notify for any changes.           Cachexia (Multi)     Thin, frail.  Minimal oral intake and is now under hospice care         Parkinson's disease without dyskinesia or fluctuating manifestations (Multi)     Advanced.   Sinemet was stopped at hospital, on comfort meds.            Aspiration into airway     She was taking medications in applesauce when she coughed, choked and liekyl aspirated and became hypoxic, sent to hospital and has aspiration pna.  She has entered hospice care, foods and drink for pleasure.  Minimal oral intake.          Hospice care - Primary     She developed aspiration pna, MBS displayed severe oropharyngeal dyspahgia.   Goals of care was discussed with family and she has entered hospice care.  She is resting in bed, comfort meds in place, arousable.  She appears to be declining and decreased oral intake thus her prognosis is terminal         Dysphagia, oropharyngeal     Severe per MBS        labs/meds/orders reviewed  staff to monitor and notify for any changes.  Hospital records reviewed.  She has entered hospice care, appears comfortable at present,    Time for coordination of care was greater than 35 minutes with hospital chart review, visit and exam, discussion of treatment plan with patient and also discussion of case with staff.      Electronically Signed By: AASHISH Cabello   6/30/24  8:29 PM

## 2024-06-28 NOTE — DISCHARGE SUMMARY
Discharge Diagnosis  Acute hypoxic respiratory failure (Multi)    Issues Requiring Follow-Up  Discharged to hospice care    Discharge Meds     Your medication list        START taking these medications        Instructions Last Dose Given Next Dose Due   moisturizing mouth solution  Commonly known as: Biotene Oral Dry Mouth      Take 1 spray by mouth if needed (Dry mouth).       oxygen gas therapy  Commonly known as: O2      Inhale 1 each once every 24 hours.              STOP taking these medications      acetaminophen 325 mg tablet  Commonly known as: Tylenol        bisacodyl 10 mg suppository  Commonly known as: Dulcolax        carbidopa-levodopa  mg tablet  Commonly known as: Sinemet        cholecalciferol 50 MCG (2000 UT) tablet  Commonly known as: Vitamin D-3        diazePAM 5 mg tablet  Commonly known as: Valium        docusate sodium 100 mg capsule  Commonly known as: Colace        fluticasone 50 mcg/actuation nasal spray  Commonly known as: Flonase        guaiFENesin 400 mg tablet  Commonly known as: Humibid 3        magnesium hydroxide 400 mg/5 mL suspension  Commonly known as: Milk of Magnesia        memantine 10 mg tablet  Commonly known as: Namenda                  Where to Get Your Medications        Information about where to get these medications is not yet available    Ask your nurse or doctor about these medications  moisturizing mouth solution  oxygen gas therapy         Test Results Pending At Discharge  Pending Labs       No current pending labs.            Hospital Course   Was admitted to the hospital related to aspiration patient became hypoxic after she was eating and vomited patient became unresponsive patient has baseline end-stage dementia the family decided to make her hospice care since she is not eating or drinking patient will be transferred to hospice    Pertinent Physical Exam At Time of Discharge  Physical Exam    Outpatient Follow-Up  No future appointments.      M H  MD Dewey

## 2024-06-28 NOTE — ASSESSMENT & PLAN NOTE
Oriented times 1, will open eyes to name, minimal verbalization.   staff to monitor and notify for any changes.

## 2024-06-28 NOTE — ASSESSMENT & PLAN NOTE
She was taking medications in applesauce when she coughed, choked and liekyl aspirated and became hypoxic, sent to hospital and has aspiration pna.  She has entered hospice care, foods and drink for pleasure.  Minimal oral intake.

## 2024-06-28 NOTE — ASSESSMENT & PLAN NOTE
She developed aspiration pna, MBS displayed severe oropharyngeal dyspahgia.   Goals of care was discussed with family and she has entered hospice care.  She is resting in bed, comfort meds in place, arousable.  She appears to be declining and decreased oral intake thus her prognosis is terminal

## 2024-06-28 NOTE — PROGRESS NOTES
Subjective   Snehal Souza is a 74 y.o. female returns from hospitalization due to hypoxia, aspiration pna  HPI She was taking her medications in applesauce when she choked, coughed and developed hypoxia prompting hospitalization where she was found to have aspiration pna.  MBS was completed showing severe oropharyngeal dysphagia.  Goals of care was discussed with family and decision was made for hospice care.    Her routine medications were discontinued.  She is resting in bed, sleeping but arousable.    Denies any complaints when asked.    Labs:  6/27  WBC: 7.1  Hgb: 11.9  Hct: 35.7  Platelet: 173    Na: 140  K: 3.2  Cl: 104  Co2: 26  BUN: 5  Creatinine: 0.27  GFR: > 90          Review of Systems   Constitutional:         Difficult to obtain due to dementia, denies any complaints when asked.        Objective   /72   Pulse 50   Temp 36.3 °C (97.4 °F)   Resp 12   SpO2 97%     Physical Exam  Constitutional:       General: She is not in acute distress.     Comments: Frail, cachectic elderly female resting in bed,  no apparent distress   HENT:      Head: Normocephalic and atraumatic.   Eyes:      Conjunctiva/sclera: Conjunctivae normal.   Cardiovascular:      Rate and Rhythm: Normal rate and regular rhythm.   Pulmonary:      Effort: Pulmonary effort is normal. No respiratory distress.      Breath sounds: Normal breath sounds.   Abdominal:      General: Bowel sounds are normal. There is no distension.      Palpations: Abdomen is soft.      Tenderness: There is no abdominal tenderness.   Musculoskeletal:      Right lower leg: No edema.      Left lower leg: No edema.      Comments: diffusely decreased muscle mass     Skin:     General: Skin is warm and dry.   Neurological:      Mental Status: She is alert.      Comments: Oriented times 1   Psychiatric:         Mood and Affect: Mood normal.         Behavior: Behavior normal.         Assessment/Plan   Problem List Items Addressed This Visit       Dementia without  behavioral disturbance (Multi)     Oriented times 1, will open eyes to name, minimal verbalization.   staff to monitor and notify for any changes.           Cachexia (Multi)     Thin, frail.  Minimal oral intake and is now under hospice care         Parkinson's disease without dyskinesia or fluctuating manifestations (Multi)     Advanced.   Sinemet was stopped at hospital, on comfort meds.            Aspiration into airway     She was taking medications in applesauce when she coughed, choked and liekyl aspirated and became hypoxic, sent to hospital and has aspiration pna.  She has entered hospice care, foods and drink for pleasure.  Minimal oral intake.          Hospice care - Primary     She developed aspiration pna, MBS displayed severe oropharyngeal dyspahgia.   Goals of care was discussed with family and she has entered hospice care.  She is resting in bed, comfort meds in place, arousable.  She appears to be declining and decreased oral intake thus her prognosis is terminal         Dysphagia, oropharyngeal     Severe per MBS        labs/meds/orders reviewed  staff to monitor and notify for any changes.  Hospital records reviewed.  She has entered hospice care, appears comfortable at present,    Time for coordination of care was greater than 35 minutes with hospital chart review, visit and exam, discussion of treatment plan with patient and also discussion of case with staff.

## 2024-07-02 ENCOUNTER — NURSING HOME VISIT (OUTPATIENT)
Dept: POST ACUTE CARE | Facility: EXTERNAL LOCATION | Age: 74
End: 2024-07-02
Payer: COMMERCIAL

## 2024-07-02 VITALS
RESPIRATION RATE: 16 BRPM | TEMPERATURE: 97.4 F | SYSTOLIC BLOOD PRESSURE: 104 MMHG | OXYGEN SATURATION: 92 % | HEART RATE: 43 BPM | DIASTOLIC BLOOD PRESSURE: 52 MMHG

## 2024-07-02 DIAGNOSIS — J96.01 ACUTE HYPOXIC RESPIRATORY FAILURE (MULTI): Primary | ICD-10-CM

## 2024-07-02 DIAGNOSIS — T17.908D ASPIRATION INTO AIRWAY, SUBSEQUENT ENCOUNTER: ICD-10-CM

## 2024-07-02 DIAGNOSIS — Z51.5 HOSPICE CARE: ICD-10-CM

## 2024-07-02 DIAGNOSIS — G20.A1 PARKINSON'S DISEASE WITHOUT DYSKINESIA OR FLUCTUATING MANIFESTATIONS (MULTI): ICD-10-CM

## 2024-07-02 DIAGNOSIS — R64 CACHEXIA (MULTI): ICD-10-CM

## 2024-07-02 DIAGNOSIS — F03.90 DEMENTIA WITHOUT BEHAVIORAL DISTURBANCE (MULTI): ICD-10-CM

## 2024-07-02 DIAGNOSIS — R13.12 DYSPHAGIA, OROPHARYNGEAL: ICD-10-CM

## 2024-07-02 PROCEDURE — 99306 1ST NF CARE HIGH MDM 50: CPT | Performed by: INTERNAL MEDICINE

## 2024-07-02 NOTE — PROGRESS NOTES
Subjective   Patient ID: Snehal Souza is a 74 y.o. female who is long term resident being seen and evaluated for multiple medical problems.    HPI   Snehal Souza is a 74 y.o. female returns from hospitalization due to hypoxia, aspiration pna  HPI She was taking her medications in applesauce when she choked, coughed and developed hypoxia prompting hospitalization where she was found to have aspiration pna.  MBS was completed showing severe oropharyngeal dysphagia.  Goals of care was discussed with family and decision was made for hospice care.    Her routine medications were discontinued.  She is resting in bed, sleeping but arousable.    Denies any complaints when asked.    This patient is resting comfortably in her bed.  She awakens to touch and smiles and having human contact.  She appears comfortable at this time.  As above the patient has entered into hospice care.     Labs:  6/27  WBC: 7.1  Hgb: 11.9  Hct: 35.7  Platelet: 173     Na: 140  K: 3.2  Cl: 104  Co2: 26  BUN: 5  Creatinine: 0.27  GFR: > 90    Albumin 4.0  Liver injury test normal  Lactic acid 0.7     Review of Systems   Constitutional:         Difficult to obtain due to dementia, denies any complaints when asked.        Objective   /52   Pulse (!) 43   Temp 36.3 °C (97.4 °F)   Resp 16   SpO2 92%     Physical Exam  Constitutional:       General: She is not in acute distress.     Comments: Frail, cachectic elderly female resting in bed,  no apparent distress   HENT:      Head: Normocephalic and atraumatic.   Eyes:      Conjunctiva/sclera: Conjunctivae normal.   Cardiovascular:      Rate and Rhythm: Normal rate and regular rhythm.   Pulmonary:      Effort: Pulmonary effort is normal. No respiratory distress.      Breath sounds: Normal breath sounds.   Abdominal:      General: Bowel sounds are normal. There is no distension.      Palpations: Abdomen is soft.      Tenderness: There is no abdominal tenderness.   Musculoskeletal:      Right lower  leg: No edema.      Left lower leg: No edema.      Comments: diffusely decreased muscle mass     Skin:     General: Skin is warm and dry.   Neurological:      Mental Status: She is alert.      Comments: Oriented times 1   Psychiatric:         Mood and Affect: Mood normal.         Behavior: Behavior normal.         Assessment/Plan   Problem List Items Addressed This Visit             ICD-10-CM    Dementia without behavioral disturbance (Multi) F03.90    Cachexia (Multi) R64    Parkinson's disease without dyskinesia or fluctuating manifestations (Multi) G20.A1    Acute hypoxic respiratory failure (Multi) - Primary J96.01    Aspiration into airway T17.908A    Hospice care Z51.5    Dysphagia, oropharyngeal R13.12     A.  We will continue with hospice and comfort care as the patient and family request    B.  Laboratory examinations will be deferred due to hospice care status    C.  The patient of course can be offered comfort foods and fluids under the auspices of hospice care    D.  This patient's prognosis is [id number].

## 2024-07-02 NOTE — LETTER
Patient: Snehal Souza  : 1950    Encounter Date: 2024    Subjective  Patient ID: Snehal Souza is a 74 y.o. female who is long term resident being seen and evaluated for multiple medical problems.    HPI   Snehal Souza is a 74 y.o. female returns from hospitalization due to hypoxia, aspiration pna  HPI She was taking her medications in applesauce when she choked, coughed and developed hypoxia prompting hospitalization where she was found to have aspiration pna.  MBS was completed showing severe oropharyngeal dysphagia.  Goals of care was discussed with family and decision was made for hospice care.    Her routine medications were discontinued.  She is resting in bed, sleeping but arousable.    Denies any complaints when asked.    This patient is resting comfortably in her bed.  She awakens to touch and smiles and having human contact.  She appears comfortable at this time.  As above the patient has entered into hospice care.     Labs:    WBC: 7.1  Hgb: 11.9  Hct: 35.7  Platelet: 173     Na: 140  K: 3.2  Cl: 104  Co2: 26  BUN: 5  Creatinine: 0.27  GFR: > 90    Albumin 4.0  Liver injury test normal  Lactic acid 0.7     Review of Systems   Constitutional:         Difficult to obtain due to dementia, denies any complaints when asked.        Objective  /52   Pulse (!) 43   Temp 36.3 °C (97.4 °F)   Resp 16   SpO2 92%     Physical Exam  Constitutional:       General: She is not in acute distress.     Comments: Frail, cachectic elderly female resting in bed,  no apparent distress   HENT:      Head: Normocephalic and atraumatic.   Eyes:      Conjunctiva/sclera: Conjunctivae normal.   Cardiovascular:      Rate and Rhythm: Normal rate and regular rhythm.   Pulmonary:      Effort: Pulmonary effort is normal. No respiratory distress.      Breath sounds: Normal breath sounds.   Abdominal:      General: Bowel sounds are normal. There is no distension.      Palpations: Abdomen is soft.      Tenderness:  There is no abdominal tenderness.   Musculoskeletal:      Right lower leg: No edema.      Left lower leg: No edema.      Comments: diffusely decreased muscle mass     Skin:     General: Skin is warm and dry.   Neurological:      Mental Status: She is alert.      Comments: Oriented times 1   Psychiatric:         Mood and Affect: Mood normal.         Behavior: Behavior normal.         Assessment/Plan  Problem List Items Addressed This Visit             ICD-10-CM    Dementia without behavioral disturbance (Multi) F03.90    Cachexia (Multi) R64    Parkinson's disease without dyskinesia or fluctuating manifestations (Multi) G20.A1    Acute hypoxic respiratory failure (Multi) - Primary J96.01    Aspiration into airway T17.908A    Hospice care Z51.5    Dysphagia, oropharyngeal R13.12     A.  We will continue with hospice and comfort care as the patient and family request    B.  Laboratory examinations will be deferred due to hospice care status    C.  The patient of course can be offered comfort foods and fluids under the auspices of hospice care    D.  This patient's prognosis is [id number].        Electronically Signed By: Luis Fernando Zelaya MD   7/9/24  9:15 AM

## 2024-07-05 LAB
ATRIAL RATE: 84 BPM
P AXIS: 85 DEGREES
P OFFSET: 179 MS
P ONSET: 134 MS
PR INTERVAL: 172 MS
Q ONSET: 220 MS
QRS COUNT: 14 BEATS
QRS DURATION: 78 MS
QT INTERVAL: 418 MS
QTC CALCULATION(BAZETT): 493 MS
QTC FREDERICIA: 467 MS
R AXIS: 54 DEGREES
T AXIS: 100 DEGREES
T OFFSET: 429 MS
VENTRICULAR RATE: 84 BPM

## 2024-07-12 ENCOUNTER — TELEPHONE (OUTPATIENT)
Dept: PRIMARY CARE | Facility: CLINIC | Age: 74
End: 2024-07-12
Payer: COMMERCIAL

## 2024-07-12 NOTE — TELEPHONE ENCOUNTER
Daysi from [facility] called to advise patient has passed away at the [facility].  DC is in the EDRS for you to complete.    DOD 7/10/24  TOD 2:50PM

## 2024-07-12 NOTE — TELEPHONE ENCOUNTER
Daysi at [facility] called to advise patient passed away at the [facility] in [facility].  Please donna patient .    DOD 7/10/24  TOD 2:50PM

## 2024-07-16 ENCOUNTER — POST MORTEM DOCUMENTATION (OUTPATIENT)
Dept: PRIMARY CARE | Facility: CLINIC | Age: 74
End: 2024-07-16
Payer: COMMERCIAL

## 2024-07-16 NOTE — PROGRESS NOTES
Daysi at [facility] called to advise patient passed away at the [facility] in [facility].  Please donna patient .     DOD 7/10/24  TOD 2:50PM